# Patient Record
Sex: MALE | Race: BLACK OR AFRICAN AMERICAN | NOT HISPANIC OR LATINO | Employment: OTHER | URBAN - METROPOLITAN AREA
[De-identification: names, ages, dates, MRNs, and addresses within clinical notes are randomized per-mention and may not be internally consistent; named-entity substitution may affect disease eponyms.]

---

## 2018-09-07 ENCOUNTER — APPOINTMENT (OUTPATIENT)
Dept: RADIOLOGY | Facility: HOSPITAL | Age: 53
End: 2018-09-07
Attending: ORTHOPAEDIC SURGERY
Payer: MEDICARE

## 2018-09-07 ENCOUNTER — TELEPHONE (OUTPATIENT)
Dept: ORTHOPEDICS | Facility: CLINIC | Age: 53
End: 2018-09-07

## 2018-09-07 ENCOUNTER — OFFICE VISIT (OUTPATIENT)
Dept: ORTHOPEDICS | Facility: CLINIC | Age: 53
End: 2018-09-07
Payer: MEDICARE

## 2018-09-07 VITALS
DIASTOLIC BLOOD PRESSURE: 80 MMHG | BODY MASS INDEX: 28.32 KG/M2 | SYSTOLIC BLOOD PRESSURE: 120 MMHG | HEIGHT: 65 IN | WEIGHT: 170 LBS | HEART RATE: 87 BPM

## 2018-09-07 DIAGNOSIS — S82.872B OPEN PILON FRACTURE, LEFT, TYPE I OR II, INITIAL ENCOUNTER: ICD-10-CM

## 2018-09-07 DIAGNOSIS — S82.872B OPEN PILON FRACTURE, LEFT, TYPE I OR II, INITIAL ENCOUNTER: Primary | ICD-10-CM

## 2018-09-07 PROCEDURE — 99203 OFFICE O/P NEW LOW 30 MIN: CPT | Mod: S$PBB,,, | Performed by: ORTHOPAEDIC SURGERY

## 2018-09-07 PROCEDURE — 99999 PR PBB SHADOW E&M-NEW PATIENT-LVL IV: CPT | Mod: PBBFAC,,, | Performed by: ORTHOPAEDIC SURGERY

## 2018-09-07 PROCEDURE — 73610 X-RAY EXAM OF ANKLE: CPT | Mod: 26,LT,, | Performed by: RADIOLOGY

## 2018-09-07 PROCEDURE — 73590 X-RAY EXAM OF LOWER LEG: CPT | Mod: TC,FY,PN,LT

## 2018-09-07 PROCEDURE — 73610 X-RAY EXAM OF ANKLE: CPT | Mod: TC,FY,PN,LT

## 2018-09-07 PROCEDURE — 99204 OFFICE O/P NEW MOD 45 MIN: CPT | Mod: PBBFAC,25,PN | Performed by: ORTHOPAEDIC SURGERY

## 2018-09-07 PROCEDURE — 3008F BODY MASS INDEX DOCD: CPT | Mod: CPTII,,, | Performed by: ORTHOPAEDIC SURGERY

## 2018-09-07 PROCEDURE — 73590 X-RAY EXAM OF LOWER LEG: CPT | Mod: 26,LT,, | Performed by: RADIOLOGY

## 2018-09-07 RX ORDER — APIXABAN 5 MG/1
5 TABLET, FILM COATED ORAL 2 TIMES DAILY
Status: ON HOLD | COMMUNITY
Start: 2018-08-14 | End: 2019-01-10 | Stop reason: HOSPADM

## 2018-09-07 RX ORDER — OXYCODONE AND ACETAMINOPHEN 5; 325 MG/1; MG/1
TABLET ORAL
COMMUNITY
Start: 2018-08-21 | End: 2018-09-07

## 2018-09-07 RX ORDER — AMLODIPINE BESYLATE 10 MG/1
10 TABLET ORAL DAILY
COMMUNITY
Start: 2018-08-14

## 2018-09-07 RX ORDER — OXYCODONE AND ACETAMINOPHEN 5; 325 MG/1; MG/1
1 TABLET ORAL EVERY 4 HOURS PRN
Qty: 15 TABLET | Refills: 0 | Status: SHIPPED | OUTPATIENT
Start: 2018-09-07 | End: 2018-09-12

## 2018-09-07 NOTE — LETTER
September 7, 2018      Edenilson Trevino MD  [address]  [address]  [place] [address]           [facility]  [address]  [place] [address]  Phone: [phone]          Patient: Elias Phelps   MR Number: [id number]   YOB: 1965   Date of Visit: 9/7/2018       Dear Dr. Edenilson Trevino:    Thank you for referring Elias Phelps to me for evaluation. Attached you will find relevant portions of my assessment and plan of care.    If you have questions, please do not hesitate to call me. I look forward to following Elias Phelps along with you.    Sincerely,    Candida Marina MD    Enclosure  CC:  No Recipients    If you would like to receive this communication electronically, please contact [email or web address] or [phone] to request more information on [employer] Link access.    For providers and/or their staff who would like to refer a patient to [facility], please contact us through our one-stop-shop provider referral line, [facility] , at [phone].    If you feel you have received this communication in error or would no longer like to receive these types of communications, please e-mail [email or web address]

## 2018-09-07 NOTE — PROGRESS NOTES
CC: Left open pilon fracture      HPI: Elias Phelps is a 52 y.o. male who presents today for follow up of his left open pilon fracture.  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  Per outside records he was taken to [facility] as a trauma activation.  He initially had a pulseless left foot however pulses returned with reduction of the fracture and he was cleared by vascular surgery.  On 8/9 he underwent I&D of the wound with ORIF of the fibula, percutaneous fixation of the tibia, antibiotic spacer placement and external fixation.  He was followed up in [facility] fracture clinic and scheduled for definitive fixation of the tibia on 8/20 but has not undergone this procedure due to insurance issues with [facility].   He reports that he has been complaint with elevation of the extremity and NWB.  He denies fever, chills, numbness or paresthesias.  He states his pain is well controlled and he has been taking his percocet sparingly.  He denies drainage through his dressings and he has not changed the dressing since he was seen in clinic.    He brought in outside medical records and imaging from [facility] which have been scanned into the media section of his chart.       Occupation: Currently unemployed    Review of Systems   Constitutional: Negative.    HENT: Negative.    Eyes: Negative.    Respiratory: Negative.    Cardiovascular: Negative.    Gastrointestinal: Negative.    Genitourinary: Negative.    Musculoskeletal: Positive for falls and joint pain.   Skin: Negative.    Neurological: Negative.    Psychiatric/Behavioral: Negative.         Review of patient's allergies indicates:  No Known Allergies    Current Outpatient Medications:     amLODIPine (NORVASC) 10 MG tablet, Take 10 mg by mouth once daily. , Disp: , Rfl:     ELIQUIS 5 mg Tab, Take 5 mg by mouth 2 (two) times daily. , Disp: , Rfl:     hydrochlorothiazide (HYDRODIURIL) 25 MG tablet, Take 0.5 tablets (12.5 mg  total) by mouth once daily., Disp: 30 tablet, Rfl: 0    lisinopril 10 MG tablet, Take 1 tablet (10 mg total) by mouth once daily., Disp: 30 tablet, Rfl: 0    oxyCODONE-acetaminophen (PERCOCET) 5-325 mg per tablet, Take 1 tablet by mouth every 4 (four) hours as needed for Pain., Disp: 15 tablet, Rfl: 0  No past medical history on file.  Social History     Tobacco Use    Smoking status: Current Every Day Smoker     Packs/day: 0.50     Types: Cigarettes    Smokeless tobacco: Never Used   Substance Use Topics    Alcohol use: Yes     Comment: ocassionallly    Drug use: No     No family history on file.    Physical Exam:     Vitals:    18 1102   BP: 120/80   Pulse: 87         Gen: NAD, A&O  Resp: No audible wheezing or respiratory distress   CV: 2+ pulses, all extremities warm and well perfused   HEENT:  Normocephalic, atraumatic    Integumentary: No rashes, lacerations or abrasions with exception of LLE as described below   Left Lower Extremity    Ex fix in place, pin sites clean and dry   Incisions and traumatic wound appear to be healing well with suture in place.   Small amount of dry serosanguinous drainage to dressing, no evidence of continued or recent drainage  No erythema, induration, fluctuance surrounding wounds  Soft tissue swelling over the tibia  appears amenable to ORIF  -- + skin wrinkling   ltsi s/s/sp/dp/t  Able to fire ta/gs but motion limited due to ex fix/apprehension. Intact ehl/fhl  2+ DP, all toes WWP with CR < 2 seconds        Imagin views L tibia and 3 views L ankle show a pilon fracture with previous ex fix placement and ORIF of the fibula with a plate and two percutenous screws into the distal lateral tibia. The fracture line extends into the joint but there does not appear to be significant comminution of the articular surface.  There is an area of bone loss over the medial tibia that has been filled with antibiotic cement.  The fracture is reasonably well reduced and there  are no radiographic signs of healing or bony bridging.      Assessment: L open pilon fracture on 8/8/18 s/p ex fix, ORIF fibula, percutaneous fixation of the tibia and antibiotic spacer placement on 8/9/18  Plan:   - Could not take sutures out today due to lack of supplies in clinic   - Continue NWB LLE  - Refilled 15 percocet 5/325 as he is almost out of his previous prescription and having significant pain at night   - Continue elevation of LLE above level of heart   - Referral placed for him to follow up with Dr. Shearer.  I discussed patient with Dr Trevino who previously treated him and he now has very limited OR time at [facility] and cannot continue care due to insurance issues with [facility].  A message was sent to Dr. Shearer's nurse to facilitate an urgent appointment.

## 2018-09-07 NOTE — TELEPHONE ENCOUNTER
Have send a message for to Dr. Shearer to reached out to Mr. Phelps to set him up with a appt soon as possible. Thanks

## 2018-09-10 ENCOUNTER — TELEPHONE (OUTPATIENT)
Dept: ORTHOPEDICS | Facility: CLINIC | Age: 53
End: 2018-09-10

## 2018-09-10 NOTE — TELEPHONE ENCOUNTER
----- Message from Destiney Scott MA sent at 9/10/2018 11:12 AM CDT -----  Contact: wife-Donya      ----- Message -----  From: Chito Claire  Sent: 9/10/2018  10:43 AM  To: Janki DHILLON Staff    Pt wife is requesting a call back regarding a sooner appt. Pt wife states pt was seen at the [facility] and was referred to Dr. Shearer for foot/ankle fracture. Pt can be reached at [phone].

## 2018-09-12 ENCOUNTER — OFFICE VISIT (OUTPATIENT)
Dept: ORTHOPEDICS | Facility: CLINIC | Age: 53
End: 2018-09-12
Payer: MEDICARE

## 2018-09-12 ENCOUNTER — TELEPHONE (OUTPATIENT)
Dept: ORTHOPEDICS | Facility: CLINIC | Age: 53
End: 2018-09-12

## 2018-09-12 ENCOUNTER — LAB VISIT (OUTPATIENT)
Dept: LAB | Facility: HOSPITAL | Age: 53
End: 2018-09-12
Payer: MEDICARE

## 2018-09-12 VITALS
BODY MASS INDEX: 28.32 KG/M2 | SYSTOLIC BLOOD PRESSURE: 126 MMHG | HEIGHT: 65 IN | DIASTOLIC BLOOD PRESSURE: 85 MMHG | TEMPERATURE: 98 F | HEART RATE: 81 BPM | WEIGHT: 170 LBS

## 2018-09-12 DIAGNOSIS — S82.872B OPEN PILON FRACTURE, LEFT, TYPE I OR II, INITIAL ENCOUNTER: Primary | ICD-10-CM

## 2018-09-12 DIAGNOSIS — S82.872B OPEN PILON FRACTURE, LEFT, TYPE I OR II, INITIAL ENCOUNTER: ICD-10-CM

## 2018-09-12 LAB
ALBUMIN SERPL BCP-MCNC: 3.6 G/DL
ALP SERPL-CCNC: 91 U/L
ALT SERPL W/O P-5'-P-CCNC: 29 U/L
ANION GAP SERPL CALC-SCNC: 7 MMOL/L
AST SERPL-CCNC: 24 U/L
BASOPHILS # BLD AUTO: 0.02 K/UL
BASOPHILS NFR BLD: 0.3 %
BILIRUB SERPL-MCNC: 0.4 MG/DL
BUN SERPL-MCNC: 18 MG/DL
CALCIUM SERPL-MCNC: 9.5 MG/DL
CHLORIDE SERPL-SCNC: 107 MMOL/L
CO2 SERPL-SCNC: 26 MMOL/L
CREAT SERPL-MCNC: 1 MG/DL
CRP SERPL-MCNC: 6.7 MG/L
DIFFERENTIAL METHOD: ABNORMAL
EOSINOPHIL # BLD AUTO: 0.2 K/UL
EOSINOPHIL NFR BLD: 2.5 %
ERYTHROCYTE [DISTWIDTH] IN BLOOD BY AUTOMATED COUNT: 13.6 %
ERYTHROCYTE [SEDIMENTATION RATE] IN BLOOD BY WESTERGREN METHOD: 25 MM/HR
EST. GFR  (AFRICAN AMERICAN): >60 ML/MIN/1.73 M^2
EST. GFR  (NON AFRICAN AMERICAN): >60 ML/MIN/1.73 M^2
GLUCOSE SERPL-MCNC: 88 MG/DL
HCT VFR BLD AUTO: 39.3 %
HGB BLD-MCNC: 12.1 G/DL
IMM GRANULOCYTES # BLD AUTO: 0.06 K/UL
IMM GRANULOCYTES NFR BLD AUTO: 0.9 %
LYMPHOCYTES # BLD AUTO: 1.8 K/UL
LYMPHOCYTES NFR BLD: 26.2 %
MCH RBC QN AUTO: 29 PG
MCHC RBC AUTO-ENTMCNC: 30.8 G/DL
MCV RBC AUTO: 94 FL
MONOCYTES # BLD AUTO: 0.5 K/UL
MONOCYTES NFR BLD: 7.9 %
NEUTROPHILS # BLD AUTO: 4.3 K/UL
NEUTROPHILS NFR BLD: 62.2 %
NRBC BLD-RTO: 0 /100 WBC
PLATELET # BLD AUTO: 306 K/UL
PMV BLD AUTO: 9.1 FL
POTASSIUM SERPL-SCNC: 4.4 MMOL/L
PREALB SERPL-MCNC: 31 MG/DL
PROT SERPL-MCNC: 7.9 G/DL
RBC # BLD AUTO: 4.17 M/UL
SODIUM SERPL-SCNC: 140 MMOL/L
WBC # BLD AUTO: 6.87 K/UL

## 2018-09-12 PROCEDURE — 36415 COLL VENOUS BLD VENIPUNCTURE: CPT

## 2018-09-12 PROCEDURE — 85652 RBC SED RATE AUTOMATED: CPT

## 2018-09-12 PROCEDURE — 99214 OFFICE O/P EST MOD 30 MIN: CPT | Mod: S$PBB,,, | Performed by: ORTHOPAEDIC SURGERY

## 2018-09-12 PROCEDURE — 3008F BODY MASS INDEX DOCD: CPT | Mod: CPTII,,, | Performed by: ORTHOPAEDIC SURGERY

## 2018-09-12 PROCEDURE — 99999 PR PBB SHADOW E&M-EST. PATIENT-LVL III: CPT | Mod: PBBFAC,,, | Performed by: ORTHOPAEDIC SURGERY

## 2018-09-12 PROCEDURE — 80053 COMPREHEN METABOLIC PANEL: CPT

## 2018-09-12 PROCEDURE — 84134 ASSAY OF PREALBUMIN: CPT

## 2018-09-12 PROCEDURE — 86140 C-REACTIVE PROTEIN: CPT

## 2018-09-12 PROCEDURE — 85025 COMPLETE CBC W/AUTO DIFF WBC: CPT

## 2018-09-12 PROCEDURE — 99213 OFFICE O/P EST LOW 20 MIN: CPT | Mod: PBBFAC | Performed by: ORTHOPAEDIC SURGERY

## 2018-09-12 RX ORDER — SULFAMETHOXAZOLE AND TRIMETHOPRIM 800; 160 MG/1; MG/1
1 TABLET ORAL 2 TIMES DAILY
Qty: 14 TABLET | Refills: 0 | Status: SHIPPED | OUTPATIENT
Start: 2018-09-12 | End: 2018-09-19

## 2018-09-12 RX ORDER — OXYCODONE AND ACETAMINOPHEN 10; 325 MG/1; MG/1
1 TABLET ORAL
Qty: 42 TABLET | Refills: 0 | Status: SHIPPED | OUTPATIENT
Start: 2018-09-12 | End: 2018-10-24

## 2018-09-12 RX ORDER — OXYCODONE AND ACETAMINOPHEN 10; 325 MG/1; MG/1
1 TABLET ORAL
Qty: 42 TABLET | Refills: 0 | Status: SHIPPED | OUTPATIENT
Start: 2018-09-12 | End: 2018-09-12

## 2018-09-12 RX ORDER — LOSARTAN POTASSIUM 50 MG/1
50 TABLET ORAL DAILY
COMMUNITY
Start: 2018-09-08

## 2018-09-12 NOTE — LETTER
September 16, 2018      Candida Marina MD  [address]  [place] [address]           [facility]  [address]  [facility] [address]  Phone: [phone]          Patient: Elias Phelps   MR Number: [id number]   YOB: 1965   Date of Visit: 9/12/2018       Dear Dr. Candida Marina:    Thank you for referring Elias Phelps to me for evaluation. Attached you will find relevant portions of my assessment and plan of care.    If you have questions, please do not hesitate to call me. I look forward to following Elias Phelps along with you.    Sincerely,    Bharathi Shearer MD    Enclosure  CC:  No Recipients    If you would like to receive this communication electronically, please contact [email or web address] or [phone] to request more information on [employer] Link access.    For providers and/or their staff who would like to refer a patient to [facility], please contact us through our one-stop-shop provider referral line, [facility] , at [phone].    If you feel you have received this communication in error or would no longer like to receive these types of communications, please e-mail [email or web address]

## 2018-09-12 NOTE — TELEPHONE ENCOUNTER
----- Message from Kathi Xie sent at 9/12/2018 11:52 AM CDT -----  Contact: Pharmacy/  [phone]  Pharmacy Calling    Reason for call: Pharmacy needs more information and coding.   Pharmacy Name: [facility].   Prescription Name: oxyCODONE-acetaminophen (PERCOCET)  mg per.   Phone Number: [phone]

## 2018-09-12 NOTE — PROGRESS NOTES
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  Originally brought to [facility].  Regained pulses and was taken to the OR for spanning external fixation with abx spacer placement and ORIF of the fibula on 8/9/18.  His medial open wound was closed at that point in time.  He was originally scheduled for f/u surgery on 8/20, but had insurance issues and was unable to do so.  He is in my clinic today to discuss options moving forward for definitive treatment.  His pain is 5/10 at its worst, controlled by PO pain medication.      Patient Active Problem List    Diagnosis Date Noted    Open pilon fracture, left, type I or II, initial encounter 09/07/2018     Past Medical History:   Diagnosis Date    Hypertension       Past Surgical History:   Procedure Laterality Date    FIBULA FRACTURE SURGERY Left     GALLBLADDER SURGERY N/A 1991        (Not in a hospital admission)  Review of patient's allergies indicates:  No Known Allergies   Social History     Tobacco Use    Smoking status: Current Every Day Smoker     Packs/day: 0.50     Types: Cigarettes    Smokeless tobacco: Never Used   Substance Use Topics    Alcohol use: Yes     Comment: ocassionallly      Family History   Problem Relation Age of Onset    Hypertension Mother     Hypertension Father       Review of Systems    ROS:  Patient denies constitutional symptoms, cardiac symptoms, respiratory symptoms, GI symptoms.  The remainder of the musculoskeletal ROS is included in the HPI.      Objective:     PE:    AA&O x 4.  NAD  HEENT:  NCAT, sclera nonicteric  Lungs:  Respirations are equal and unlabored.  CV:  2+ bilateral upper and lower extremity pulses.    MS:  LLE - in spanning external fixation.  Lateral incision well healing.  Sutures in place.  Medial open wound vertical with transverse anterior component.  Sutures in place here as well.  No drainage or erythema.  Swollen but compartments soft.  Pin sites with some  dry scab.  Medial wound with some overlying scab.  Moves all toes.  2+ DP and PT pulses.        Imaging Review  External fixation in place.  Lateral Marti fibular plate.  Tibia with small medial cement abx spacer.  Mild medial translation and varus distal tibia. 2 screws A to P in the [place] area laterally just adjacent to the incisura.     Assessment:     Grade II-III open left tibial pilon fracture status post D&I, spanning external fixation and ORIF of the fibula, now 31 days out.      Plan:     We had a long discussion about his ankle today.  Ideally, this is a fracture pattern I would prefer to treat with a medial plate.  The open injury will likely preclude this as an option.  He is just over 4 weeks out and his fracture is very distal.  He has only a centimeter or 2 of bone between the physeal scar and the fracture site.  This would make nailing very difficult, and at this point, I would not want to do that through the old pin sites without first changing out the pins and debriding the old pin tracts.  With the open fracture, he is already at increased risk for infection.  I'd like to get the antibiotic spacer out, but going through the medial wound to do that may be difficult.      I would like to take him to the OR, clean up the medial wound and see how the soft tissue is doing, what is scab vs dried blood,  I will try to adjust the ex fix and see if I can straighten out the mild varus and medial translation he's fallen into over the last month, and get a better read on what the overall options will be.  I explained that I might just have to revise the ex fix with the plan of keeping him in that for definitive treatment.  I would also like to get him on Forteo, as it appears his fracture is comminuted and he likely had some bone debrided at initial surgery, and he has some gap to fill.    The risks, benefits and alternatives to surgery were discussed with the patient at great length.  These include  bleeding, infection, vessel/nerve damage, pain, numbness, tingling, complex regional pain syndrome, hardware/surgical failure, need for further surgery, malunion, nonunion, DVT, PE, arthritis and death.  He is alos at increased risk for infection with open fracture.  Patient states an understanding and wishes to proceed with surgery.   All questions were answered.  No guarantees were implied or stated.  Informed consent was obtained.

## 2018-09-14 ENCOUNTER — TELEPHONE (OUTPATIENT)
Dept: ORTHOPEDICS | Facility: CLINIC | Age: 53
End: 2018-09-14

## 2018-09-14 NOTE — TELEPHONE ENCOUNTER
Spoke with pt.   Advised to take his Eliquis Sunday am but to hold the medication after that dose until after his sx on Tuesday, per Dr Shearer.  Pt verbalized understanding.

## 2018-09-14 NOTE — TELEPHONE ENCOUNTER
----- Message from Yennifer Enciso PA-C sent at 9/14/2018  8:41 AM CDT -----  [facility]  Prescription should have [facility]dy [facility], I though you has called and cancelled [facility],   Please check.   ----- Message -----  From: Destiney Scott MA  Sent: 9/14/2018   8:32 AM  To: Yennifer Enciso PA-C        ----- Message -----  From: Dago Morrow  Sent: 9/14/2018   8:22 AM  To: Fernie De Oliveira Staff    Need a diagnosis for the oxyCODONE-acetaminophen (PERCOCET)  mg per tablet

## 2018-09-14 NOTE — TELEPHONE ENCOUNTER
Notified [facility] [phone] and spoke with Cristin, who stated that Yue just deactivate the Rx Percocet on 09/12/18 and not cx. Today, 09/14/18 Cristin have cx the Rx all together, Percocet.

## 2018-09-17 ENCOUNTER — TELEPHONE (OUTPATIENT)
Dept: ORTHOPEDICS | Facility: CLINIC | Age: 53
End: 2018-09-17

## 2018-09-17 ENCOUNTER — ANESTHESIA EVENT (OUTPATIENT)
Dept: SURGERY | Facility: HOSPITAL | Age: 53
End: 2018-09-17
Payer: MEDICARE

## 2018-09-17 NOTE — H&P
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  Originally brought to [facility].  Regained pulses and was taken to the OR for spanning external fixation with abx spacer placement and ORIF of the fibula on 8/9/18.  His medial open wound was closed at that point in time.  He was originally scheduled for f/u surgery on 8/20, but had insurance issues and was unable to do so.  He is in my clinic today to discuss options moving forward for definitive treatment.  His pain is 5/10 at its worst, controlled by PO pain medication.      Patient Active Problem List    Diagnosis Date Noted    Open pilon fracture, left, type I or II, initial encounter 09/07/2018     Past Medical History:   Diagnosis Date    Hypertension       Past Surgical History:   Procedure Laterality Date    FIBULA FRACTURE SURGERY Left     GALLBLADDER SURGERY N/A 1991        (Not in a hospital admission)  Review of patient's allergies indicates:  No Known Allergies   Social History     Tobacco Use    Smoking status: Current Every Day Smoker     Packs/day: 0.50     Types: Cigarettes    Smokeless tobacco: Never Used   Substance Use Topics    Alcohol use: Yes     Comment: ocassionallly      Family History   Problem Relation Age of Onset    Hypertension Mother     Hypertension Father       Review of Systems    ROS:  Patient denies constitutional symptoms, cardiac symptoms, respiratory symptoms, GI symptoms.  The remainder of the musculoskeletal ROS is included in the HPI.      Objective:     PE:    AA&O x 4.  NAD  HEENT:  NCAT, sclera nonicteric  Lungs:  Respirations are equal and unlabored.  CV:  2+ bilateral upper and lower extremity pulses.    MS:  LLE - in spanning external fixation.  Lateral incision well healing.  Sutures in place.  Medial open wound vertical with transverse anterior component.  Sutures in place here as well.  No drainage or erythema.  Swollen but compartments soft.  Pin sites with some  dry scab.  Medial wound with some overlying scab.  Moves all toes.  2+ DP and PT pulses.        Imaging Review  External fixation in place.  Lateral Marti fibular plate.  Tibia with small medial cement abx spacer.  Mild medial translation and varus distal tibia. 2 screws A to P in the [place] area laterally just adjacent to the incisura.     Assessment:     Grade II-III open left tibial pilon fracture status post D&I, spanning external fixation and ORIF of the fibula, now 31 days out.      Plan:     We had a long discussion about his ankle today.  Ideally, this is a fracture pattern I would prefer to treat with a medial plate.  The open injury will likely preclude this as an option.  He is just over 4 weeks out and his fracture is very distal.  He has only a centimeter or 2 of bone between the physeal scar and the fracture site.  This would make nailing very difficult, and at this point, I would not want to do that through the old pin sites without first changing out the pins and debriding the old pin tracts.  With the open fracture, he is already at increased risk for infection.  I'd like to get the antibiotic spacer out, but going through the medial wound to do that may be difficult.      I would like to take him to the OR, clean up the medial wound and see how the soft tissue is doing, what is scab vs dried blood,  I will try to adjust the ex fix and see if I can straighten out the mild varus and medial translation he's fallen into over the last month, and get a better read on what the overall options will be.  I explained that I might just have to revise the ex fix with the plan of keeping him in that for definitive treatment.  I would also like to get him on Forteo, as it appears his fracture is comminuted and he likely had some bone debrided at initial surgery, and he has some gap to fill.    The risks, benefits and alternatives to surgery were discussed with the patient at great length.  These include  bleeding, infection, vessel/nerve damage, pain, numbness, tingling, complex regional pain syndrome, hardware/surgical failure, need for further surgery, malunion, nonunion, DVT, PE, arthritis and death.  He is alos at increased risk for infection with open fracture.  Patient states an understanding and wishes to proceed with surgery.   All questions were answered.  No guarantees were implied or stated.  Informed consent was obtained.

## 2018-09-17 NOTE — H&P (VIEW-ONLY)
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  Originally brought to [facility].  Regained pulses and was taken to the OR for spanning external fixation with abx spacer placement and ORIF of the fibula on 8/9/18.  His medial open wound was closed at that point in time.  He was originally scheduled for f/u surgery on 8/20, but had insurance issues and was unable to do so.  He is in my clinic today to discuss options moving forward for definitive treatment.  His pain is 5/10 at its worst, controlled by PO pain medication.      Patient Active Problem List    Diagnosis Date Noted    Open pilon fracture, left, type I or II, initial encounter 09/07/2018     Past Medical History:   Diagnosis Date    Hypertension       Past Surgical History:   Procedure Laterality Date    FIBULA FRACTURE SURGERY Left     GALLBLADDER SURGERY N/A 1991        (Not in a hospital admission)  Review of patient's allergies indicates:  No Known Allergies   Social History     Tobacco Use    Smoking status: Current Every Day Smoker     Packs/day: 0.50     Types: Cigarettes    Smokeless tobacco: Never Used   Substance Use Topics    Alcohol use: Yes     Comment: ocassionallly      Family History   Problem Relation Age of Onset    Hypertension Mother     Hypertension Father       Review of Systems    ROS:  Patient denies constitutional symptoms, cardiac symptoms, respiratory symptoms, GI symptoms.  The remainder of the musculoskeletal ROS is included in the HPI.      Objective:     PE:    AA&O x 4.  NAD  HEENT:  NCAT, sclera nonicteric  Lungs:  Respirations are equal and unlabored.  CV:  2+ bilateral upper and lower extremity pulses.    MS:  LLE - in spanning external fixation.  Lateral incision well healing.  Sutures in place.  Medial open wound vertical with transverse anterior component.  Sutures in place here as well.  No drainage or erythema.  Swollen but compartments soft.  Pin sites with some  dry scab.  Medial wound with some overlying scab.  Moves all toes.  2+ DP and PT pulses.        Imaging Review  External fixation in place.  Lateral Marti fibular plate.  Tibia with small medial cement abx spacer.  Mild medial translation and varus distal tibia. 2 screws A to P in the [place] area laterally just adjacent to the incisura.     Assessment:     Grade II-III open left tibial pilon fracture status post D&I, spanning external fixation and ORIF of the fibula, now 31 days out.      Plan:     We had a long discussion about his ankle today.  Ideally, this is a fracture pattern I would prefer to treat with a medial plate.  The open injury will likely preclude this as an option.  He is just over 4 weeks out and his fracture is very distal.  He has only a centimeter or 2 of bone between the physeal scar and the fracture site.  This would make nailing very difficult, and at this point, I would not want to do that through the old pin sites without first changing out the pins and debriding the old pin tracts.  With the open fracture, he is already at increased risk for infection.  I'd like to get the antibiotic spacer out, but going through the medial wound to do that may be difficult.      I would like to take him to the OR, clean up the medial wound and see how the soft tissue is doing, what is scab vs dried blood,  I will try to adjust the ex fix and see if I can straighten out the mild varus and medial translation he's fallen into over the last month, and get a better read on what the overall options will be.  I explained that I might just have to revise the ex fix with the plan of keeping him in that for definitive treatment.  I would also like to get him on Forteo, as it appears his fracture is comminuted and he likely had some bone debrided at initial surgery, and he has some gap to fill.    The risks, benefits and alternatives to surgery were discussed with the patient at great length.  These include  bleeding, infection, vessel/nerve damage, pain, numbness, tingling, complex regional pain syndrome, hardware/surgical failure, need for further surgery, malunion, nonunion, DVT, PE, arthritis and death.  He is alos at increased risk for infection with open fracture.  Patient states an understanding and wishes to proceed with surgery.   All questions were answered.  No guarantees were implied or stated.  Informed consent was obtained.

## 2018-09-18 ENCOUNTER — ANESTHESIA (OUTPATIENT)
Dept: SURGERY | Facility: HOSPITAL | Age: 53
End: 2018-09-18
Payer: MEDICARE

## 2018-09-18 ENCOUNTER — HOSPITAL ENCOUNTER (OUTPATIENT)
Facility: HOSPITAL | Age: 53
Discharge: STILL ADMITTED OR ADMINISTRATIVE | End: 2018-09-21
Attending: ORTHOPAEDIC SURGERY | Admitting: ORTHOPAEDIC SURGERY
Payer: MEDICARE

## 2018-09-18 DIAGNOSIS — S82.872B OPEN PILON FRACTURE, LEFT, TYPE I OR II, INITIAL ENCOUNTER: Primary | ICD-10-CM

## 2018-09-18 DIAGNOSIS — S82.872B: ICD-10-CM

## 2018-09-18 PROCEDURE — 76942 ECHO GUIDE FOR BIOPSY: CPT | Mod: 26,,, | Performed by: ANESTHESIOLOGY

## 2018-09-18 PROCEDURE — 63600175 PHARM REV CODE 636 W HCPCS: Performed by: STUDENT IN AN ORGANIZED HEALTH CARE EDUCATION/TRAINING PROGRAM

## 2018-09-18 PROCEDURE — 36000706: Performed by: ORTHOPAEDIC SURGERY

## 2018-09-18 PROCEDURE — 25000003 PHARM REV CODE 250: Performed by: NURSE ANESTHETIST, CERTIFIED REGISTERED

## 2018-09-18 PROCEDURE — 25000003 PHARM REV CODE 250: Performed by: STUDENT IN AN ORGANIZED HEALTH CARE EDUCATION/TRAINING PROGRAM

## 2018-09-18 PROCEDURE — 27200750 HC INSULATED NEEDLE/ STIMUPLEX: Performed by: STUDENT IN AN ORGANIZED HEALTH CARE EDUCATION/TRAINING PROGRAM

## 2018-09-18 PROCEDURE — D9220A PRA ANESTHESIA: Mod: CRNA,,, | Performed by: NURSE ANESTHETIST, CERTIFIED REGISTERED

## 2018-09-18 PROCEDURE — 37000008 HC ANESTHESIA 1ST 15 MINUTES: Performed by: ORTHOPAEDIC SURGERY

## 2018-09-18 PROCEDURE — 11012 DEB SKIN BONE AT FX SITE: CPT | Mod: 51,,, | Performed by: ORTHOPAEDIC SURGERY

## 2018-09-18 PROCEDURE — 27201423 OPTIME MED/SURG SUP & DEVICES STERILE SUPPLY: Performed by: ORTHOPAEDIC SURGERY

## 2018-09-18 PROCEDURE — 71000033 HC RECOVERY, INTIAL HOUR: Performed by: ORTHOPAEDIC SURGERY

## 2018-09-18 PROCEDURE — 36000707: Performed by: ORTHOPAEDIC SURGERY

## 2018-09-18 PROCEDURE — C1713 ANCHOR/SCREW BN/BN,TIS/BN: HCPCS | Performed by: ORTHOPAEDIC SURGERY

## 2018-09-18 PROCEDURE — 64450 NJX AA&/STRD OTHER PN/BRANCH: CPT | Mod: 59,LT,, | Performed by: ANESTHESIOLOGY

## 2018-09-18 PROCEDURE — 20693 ADJMT/REVJ EXT FIXJ SYS ANES: CPT | Mod: 51,RT,, | Performed by: ORTHOPAEDIC SURGERY

## 2018-09-18 PROCEDURE — 63600175 PHARM REV CODE 636 W HCPCS: Performed by: NURSE ANESTHETIST, CERTIFIED REGISTERED

## 2018-09-18 PROCEDURE — 76942 ECHO GUIDE FOR BIOPSY: CPT | Performed by: STUDENT IN AN ORGANIZED HEALTH CARE EDUCATION/TRAINING PROGRAM

## 2018-09-18 PROCEDURE — D9220A PRA ANESTHESIA: Mod: ANES,,, | Performed by: ANESTHESIOLOGY

## 2018-09-18 PROCEDURE — 27000221 HC OXYGEN, UP TO 24 HOURS

## 2018-09-18 PROCEDURE — 37000009 HC ANESTHESIA EA ADD 15 MINS: Performed by: ORTHOPAEDIC SURGERY

## 2018-09-18 PROCEDURE — 27825 TREAT LOWER LEG FRACTURE: CPT | Mod: RT,,, | Performed by: ORTHOPAEDIC SURGERY

## 2018-09-18 PROCEDURE — 64447 NJX AA&/STRD FEMORAL NRV IMG: CPT | Performed by: ANESTHESIOLOGY

## 2018-09-18 PROCEDURE — 63600175 PHARM REV CODE 636 W HCPCS: Performed by: ANESTHESIOLOGY

## 2018-09-18 PROCEDURE — 64445 NJX AA&/STRD SCIATIC NRV IMG: CPT | Performed by: STUDENT IN AN ORGANIZED HEALTH CARE EDUCATION/TRAINING PROGRAM

## 2018-09-18 PROCEDURE — 94761 N-INVAS EAR/PLS OXIMETRY MLT: CPT

## 2018-09-18 PROCEDURE — 20680 REMOVAL OF IMPLANT DEEP: CPT | Mod: 51,,, | Performed by: ORTHOPAEDIC SURGERY

## 2018-09-18 PROCEDURE — G0378 HOSPITAL OBSERVATION PER HR: HCPCS

## 2018-09-18 PROCEDURE — 71000039 HC RECOVERY, EACH ADD'L HOUR: Performed by: ORTHOPAEDIC SURGERY

## 2018-09-18 PROCEDURE — 63600175 PHARM REV CODE 636 W HCPCS: Performed by: ORTHOPAEDIC SURGERY

## 2018-09-18 PROCEDURE — 27800903 OPTIME MED/SURG SUP & DEVICES OTHER IMPLANTS: Performed by: ORTHOPAEDIC SURGERY

## 2018-09-18 PROCEDURE — 25000003 PHARM REV CODE 250: Performed by: ORTHOPAEDIC SURGERY

## 2018-09-18 PROCEDURE — 12000002 HC ACUTE/MED SURGE SEMI-PRIVATE ROOM

## 2018-09-18 DEVICE — IMPLANTABLE DEVICE: Type: IMPLANTABLE DEVICE | Site: ANKLE | Status: FUNCTIONAL

## 2018-09-18 DEVICE — PIN TRNSFIXTN APEX 5/6X40X300M: Type: IMPLANTABLE DEVICE | Site: ANKLE | Status: FUNCTIONAL

## 2018-09-18 DEVICE — KIT MED BONE INFUSE: Type: IMPLANTABLE DEVICE | Site: ANKLE | Status: FUNCTIONAL

## 2018-09-18 DEVICE — PIN HALF APEX 4/5MM 40X150MM: Type: IMPLANTABLE DEVICE | Site: ANKLE | Status: FUNCTIONAL

## 2018-09-18 DEVICE — PIN APEX 5 X 150: Type: IMPLANTABLE DEVICE | Site: ANKLE | Status: FUNCTIONAL

## 2018-09-18 RX ORDER — ONDANSETRON 2 MG/ML
INJECTION INTRAMUSCULAR; INTRAVENOUS
Status: DISCONTINUED | OUTPATIENT
Start: 2018-09-18 | End: 2018-09-18

## 2018-09-18 RX ORDER — ONDANSETRON 8 MG/1
8 TABLET, ORALLY DISINTEGRATING ORAL EVERY 6 HOURS PRN
Qty: 30 TABLET | Refills: 0 | Status: SHIPPED | OUTPATIENT
Start: 2018-09-18 | End: 2018-12-05

## 2018-09-18 RX ORDER — OXYCODONE HYDROCHLORIDE 5 MG/1
5 TABLET ORAL
Status: DISCONTINUED | OUTPATIENT
Start: 2018-09-18 | End: 2018-09-18 | Stop reason: HOSPADM

## 2018-09-18 RX ORDER — LIDOCAINE HCL/PF 100 MG/5ML
SYRINGE (ML) INTRAVENOUS
Status: DISCONTINUED | OUTPATIENT
Start: 2018-09-18 | End: 2018-09-18

## 2018-09-18 RX ORDER — GLYCOPYRROLATE 0.2 MG/ML
INJECTION INTRAMUSCULAR; INTRAVENOUS
Status: DISCONTINUED | OUTPATIENT
Start: 2018-09-18 | End: 2018-09-18

## 2018-09-18 RX ORDER — MIDAZOLAM HYDROCHLORIDE 1 MG/ML
0.5 INJECTION INTRAMUSCULAR; INTRAVENOUS
Status: DISCONTINUED | OUTPATIENT
Start: 2018-09-18 | End: 2018-09-18 | Stop reason: HOSPADM

## 2018-09-18 RX ORDER — LOSARTAN POTASSIUM 25 MG/1
25 TABLET ORAL DAILY
Status: DISCONTINUED | OUTPATIENT
Start: 2018-09-19 | End: 2018-09-21 | Stop reason: HOSPADM

## 2018-09-18 RX ORDER — CEFAZOLIN SODIUM 1 G/3ML
2 INJECTION, POWDER, FOR SOLUTION INTRAMUSCULAR; INTRAVENOUS
Status: COMPLETED | OUTPATIENT
Start: 2018-09-18 | End: 2018-09-18

## 2018-09-18 RX ORDER — FENTANYL CITRATE 50 UG/ML
25 INJECTION, SOLUTION INTRAMUSCULAR; INTRAVENOUS EVERY 5 MIN PRN
Status: DISCONTINUED | OUTPATIENT
Start: 2018-09-18 | End: 2018-09-18 | Stop reason: HOSPADM

## 2018-09-18 RX ORDER — PHENYLEPHRINE HYDROCHLORIDE 10 MG/ML
INJECTION INTRAVENOUS
Status: DISCONTINUED | OUTPATIENT
Start: 2018-09-18 | End: 2018-09-18

## 2018-09-18 RX ORDER — OXYCODONE HYDROCHLORIDE 10 MG/1
10 TABLET ORAL EVERY 4 HOURS PRN
Status: DISCONTINUED | OUTPATIENT
Start: 2018-09-18 | End: 2018-09-19

## 2018-09-18 RX ORDER — NEOSTIGMINE METHYLSULFATE 1 MG/ML
INJECTION, SOLUTION INTRAVENOUS
Status: DISCONTINUED | OUTPATIENT
Start: 2018-09-18 | End: 2018-09-18

## 2018-09-18 RX ORDER — SODIUM CHLORIDE 9 MG/ML
INJECTION, SOLUTION INTRAVENOUS CONTINUOUS PRN
Status: DISCONTINUED | OUTPATIENT
Start: 2018-09-18 | End: 2018-09-18

## 2018-09-18 RX ORDER — OXYCODONE HYDROCHLORIDE 5 MG/1
5 TABLET ORAL EVERY 4 HOURS PRN
Status: DISCONTINUED | OUTPATIENT
Start: 2018-09-18 | End: 2018-09-19

## 2018-09-18 RX ORDER — BUPIVACAINE HYDROCHLORIDE AND EPINEPHRINE 5; 5 MG/ML; UG/ML
INJECTION, SOLUTION EPIDURAL; INTRACAUDAL; PERINEURAL
Status: COMPLETED | OUTPATIENT
Start: 2018-09-18 | End: 2018-09-18

## 2018-09-18 RX ORDER — DOCUSATE SODIUM 100 MG/1
100 CAPSULE, LIQUID FILLED ORAL 2 TIMES DAILY PRN
Qty: 60 CAPSULE | Refills: 0 | Status: SHIPPED | OUTPATIENT
Start: 2018-09-18 | End: 2018-12-05

## 2018-09-18 RX ORDER — ROCURONIUM BROMIDE 10 MG/ML
INJECTION, SOLUTION INTRAVENOUS
Status: DISCONTINUED | OUTPATIENT
Start: 2018-09-18 | End: 2018-09-18

## 2018-09-18 RX ORDER — PROPOFOL 10 MG/ML
VIAL (ML) INTRAVENOUS
Status: DISCONTINUED | OUTPATIENT
Start: 2018-09-18 | End: 2018-09-18

## 2018-09-18 RX ORDER — ACETAMINOPHEN 500 MG
1000 TABLET ORAL EVERY 6 HOURS PRN
Status: DISCONTINUED | OUTPATIENT
Start: 2018-09-18 | End: 2018-09-20

## 2018-09-18 RX ORDER — CEFAZOLIN SODIUM 1 G/3ML
2 INJECTION, POWDER, FOR SOLUTION INTRAMUSCULAR; INTRAVENOUS
Status: DISCONTINUED | OUTPATIENT
Start: 2018-09-18 | End: 2018-09-21 | Stop reason: HOSPADM

## 2018-09-18 RX ORDER — EPHEDRINE SULFATE 50 MG/ML
INJECTION, SOLUTION INTRAVENOUS
Status: DISCONTINUED | OUTPATIENT
Start: 2018-09-18 | End: 2018-09-18

## 2018-09-18 RX ORDER — VANCOMYCIN HCL IN 5 % DEXTROSE 1G/250ML
1000 PLASTIC BAG, INJECTION (ML) INTRAVENOUS
Status: DISCONTINUED | OUTPATIENT
Start: 2018-09-18 | End: 2018-09-21 | Stop reason: HOSPADM

## 2018-09-18 RX ORDER — OXYCODONE AND ACETAMINOPHEN 5; 325 MG/1; MG/1
1 TABLET ORAL EVERY 4 HOURS PRN
Qty: 41 TABLET | Refills: 0 | Status: SHIPPED | OUTPATIENT
Start: 2018-09-18 | End: 2018-10-24 | Stop reason: ALTCHOICE

## 2018-09-18 RX ORDER — AMLODIPINE BESYLATE 10 MG/1
10 TABLET ORAL DAILY
Status: DISCONTINUED | OUTPATIENT
Start: 2018-09-19 | End: 2018-09-21 | Stop reason: HOSPADM

## 2018-09-18 RX ORDER — ONDANSETRON 4 MG/1
4 TABLET, FILM COATED ORAL EVERY 6 HOURS PRN
Status: DISCONTINUED | OUTPATIENT
Start: 2018-09-18 | End: 2018-09-21 | Stop reason: HOSPADM

## 2018-09-18 RX ORDER — HYDROMORPHONE HYDROCHLORIDE 1 MG/ML
0.5 INJECTION, SOLUTION INTRAMUSCULAR; INTRAVENOUS; SUBCUTANEOUS EVERY 6 HOURS PRN
Status: DISCONTINUED | OUTPATIENT
Start: 2018-09-18 | End: 2018-09-19

## 2018-09-18 RX ORDER — FENTANYL CITRATE 50 UG/ML
INJECTION, SOLUTION INTRAMUSCULAR; INTRAVENOUS
Status: DISCONTINUED | OUTPATIENT
Start: 2018-09-18 | End: 2018-09-18

## 2018-09-18 RX ORDER — VANCOMYCIN HYDROCHLORIDE 1 G/20ML
INJECTION, POWDER, LYOPHILIZED, FOR SOLUTION INTRAVENOUS
Status: DISCONTINUED | OUTPATIENT
Start: 2018-09-18 | End: 2018-09-18 | Stop reason: HOSPADM

## 2018-09-18 RX ADMIN — PHENYLEPHRINE HYDROCHLORIDE 200 MCG: 10 INJECTION INTRAVENOUS at 07:09

## 2018-09-18 RX ADMIN — FENTANYL CITRATE 100 MCG: 50 INJECTION, SOLUTION INTRAMUSCULAR; INTRAVENOUS at 07:09

## 2018-09-18 RX ADMIN — PHENYLEPHRINE HYDROCHLORIDE 250 MCG: 10 INJECTION INTRAVENOUS at 08:09

## 2018-09-18 RX ADMIN — ROCURONIUM BROMIDE 10 MG: 10 INJECTION, SOLUTION INTRAVENOUS at 08:09

## 2018-09-18 RX ADMIN — SODIUM CHLORIDE, SODIUM GLUCONATE, SODIUM ACETATE, POTASSIUM CHLORIDE, MAGNESIUM CHLORIDE, SODIUM PHOSPHATE, DIBASIC, AND POTASSIUM PHOSPHATE: .53; .5; .37; .037; .03; .012; .00082 INJECTION, SOLUTION INTRAVENOUS at 07:09

## 2018-09-18 RX ADMIN — APIXABAN 5 MG: 2.5 TABLET, FILM COATED ORAL at 09:09

## 2018-09-18 RX ADMIN — CEFAZOLIN 2 G: 330 INJECTION, POWDER, FOR SOLUTION INTRAMUSCULAR; INTRAVENOUS at 03:09

## 2018-09-18 RX ADMIN — VANCOMYCIN HYDROCHLORIDE 1000 MG: 1 INJECTION, POWDER, LYOPHILIZED, FOR SOLUTION INTRAVENOUS at 11:09

## 2018-09-18 RX ADMIN — EPHEDRINE SULFATE 5 MG: 50 INJECTION, SOLUTION INTRAMUSCULAR; INTRAVENOUS; SUBCUTANEOUS at 09:09

## 2018-09-18 RX ADMIN — Medication 0.5 MG: at 07:09

## 2018-09-18 RX ADMIN — CEFAZOLIN 2 G: 330 INJECTION, POWDER, FOR SOLUTION INTRAMUSCULAR; INTRAVENOUS at 07:09

## 2018-09-18 RX ADMIN — ROCURONIUM BROMIDE 40 MG: 10 INJECTION, SOLUTION INTRAVENOUS at 07:09

## 2018-09-18 RX ADMIN — GLYCOPYRROLATE 0.6 MG: 0.2 INJECTION, SOLUTION INTRAMUSCULAR; INTRAVENOUS at 09:09

## 2018-09-18 RX ADMIN — EPHEDRINE SULFATE 15 MG: 50 INJECTION, SOLUTION INTRAMUSCULAR; INTRAVENOUS; SUBCUTANEOUS at 08:09

## 2018-09-18 RX ADMIN — Medication 0.5 MG: at 01:09

## 2018-09-18 RX ADMIN — BUPIVACAINE HYDROCHLORIDE AND EPINEPHRINE BITARTRATE 30 ML: 5; .0091 INJECTION, SOLUTION EPIDURAL; INTRACAUDAL; PERINEURAL at 06:09

## 2018-09-18 RX ADMIN — PROPOFOL 150 MG: 10 INJECTION, EMULSION INTRAVENOUS at 07:09

## 2018-09-18 RX ADMIN — DOCUSATE SODIUM 50 MG: 50 CAPSULE, LIQUID FILLED ORAL at 01:09

## 2018-09-18 RX ADMIN — ROCURONIUM BROMIDE 20 MG: 10 INJECTION, SOLUTION INTRAVENOUS at 08:09

## 2018-09-18 RX ADMIN — CEFAZOLIN 2 G: 330 INJECTION, POWDER, FOR SOLUTION INTRAMUSCULAR; INTRAVENOUS at 11:09

## 2018-09-18 RX ADMIN — SODIUM CHLORIDE: 0.9 INJECTION, SOLUTION INTRAVENOUS at 06:09

## 2018-09-18 RX ADMIN — OXYCODONE HYDROCHLORIDE 10 MG: 10 TABLET ORAL at 10:09

## 2018-09-18 RX ADMIN — MIDAZOLAM HYDROCHLORIDE 2 MG: 1 INJECTION, SOLUTION INTRAMUSCULAR; INTRAVENOUS at 06:09

## 2018-09-18 RX ADMIN — ONDANSETRON 4 MG: 2 INJECTION INTRAMUSCULAR; INTRAVENOUS at 09:09

## 2018-09-18 RX ADMIN — NEOSTIGMINE METHYLSULFATE 5 MG: 1 INJECTION INTRAVENOUS at 09:09

## 2018-09-18 RX ADMIN — PHENYLEPHRINE HYDROCHLORIDE 300 MCG: 10 INJECTION INTRAVENOUS at 07:09

## 2018-09-18 RX ADMIN — ACETAMINOPHEN 1000 MG: 500 TABLET ORAL at 04:09

## 2018-09-18 RX ADMIN — OXYCODONE HYDROCHLORIDE 10 MG: 10 TABLET ORAL at 05:09

## 2018-09-18 RX ADMIN — EPHEDRINE SULFATE 5 MG: 50 INJECTION, SOLUTION INTRAMUSCULAR; INTRAVENOUS; SUBCUTANEOUS at 08:09

## 2018-09-18 RX ADMIN — EPHEDRINE SULFATE 25 MG: 50 INJECTION, SOLUTION INTRAMUSCULAR; INTRAVENOUS; SUBCUTANEOUS at 08:09

## 2018-09-18 RX ADMIN — LIDOCAINE HYDROCHLORIDE 100 MG: 20 INJECTION, SOLUTION INTRAVENOUS at 07:09

## 2018-09-18 NOTE — BRIEF OP NOTE
BRIEF OP NOTE    Preop Dx: Grade II open right tibial pilon with fibula fracture status post ORIF of fibula and partial tibia, with external fixation of tibia with I&D    Postop Dx: Grade II open right tibial pilon with fibula fracture status post ORIF of fibula and partial tibia, with external fixation of tibia with I&D    Procedure: 1.  Revision of external fixation right tibial pilon fracture - 20694    2.  Closed treatment of right tibial pilon fracture with manipulation under anesthesia - 27825    3.  Removal of antibiotic cement spacer, deep right tibia - 20680    4.  Non excisional debridement and irrigation of bone open right tibial pilon fracture - 11012    Surgeon: Bharathi Shearer M.D.    Asst:  Sherry Flores M.D    Anesthesia: GETA    EBL:  5cc    IVF:  1500cc crystalloid    Implants: [place] ex fix.  Medium Infuse sponge    Specimens: None    Findings: Able to regain good distal tibial alignment.    Dispo:  To PACU extubated/stable     Dict#  [id number]

## 2018-09-18 NOTE — NURSING
Pt arrived to floor AAO x 4, small bleeding at pinsite. Area covered with ice and elevated. Is instruction provided. fcd on right foot. Call light in reach. Pain medicine given. Whiteboard updated. Will continue to monitor.

## 2018-09-18 NOTE — ANESTHESIA PREPROCEDURE EVALUATION
09/18/2018  lEias Phelps is a 52 y.o., male.    Anesthesia Evaluation    I have reviewed the Patient Summary Reports.    I have reviewed the Nursing Notes.   I have reviewed the Medications.     Review of Systems      Physical Exam  General:  Well nourished    Airway/Jaw/Neck:  Airway Findings: Mouth Opening: Normal Tongue: Normal  General Airway Assessment: Average  Mallampati: III  Improves to II with phonation.  TM Distance: Normal, at least 6 cm  Jaw/Neck Findings:  Neck ROM: Normal ROM            Mental Status:  Mental Status Findings:  Alert and Oriented       Patient Active Problem List   Diagnosis    Open pilon fracture, left, type I or II, initial encounter         Anesthesia Plan  Type of Anesthesia, risks & benefits discussed:  Anesthesia Type:  general, MAC, regional  Patient's Preference:   Intra-op Monitoring Plan: standard ASA monitors  Intra-op Monitoring Plan Comments:   Post Op Pain Control Plan:   Post Op Pain Control Plan Comments:   Induction:   IV  Beta Blocker:  Patient is not currently on a Beta-Blocker (No further documentation required).       Informed Consent: Patient understands risks and agrees with Anesthesia plan.  Questions answered. Anesthesia consent signed with patient.  ASA Score: 2     Day of Surgery Review of History & Physical:    H&P update referred to the surgeon.         Ready For Surgery From Anesthesia Perspective.

## 2018-09-18 NOTE — TRANSFER OF CARE
"Anesthesia Transfer of Care Note    Patient: Elias Phelps    Procedure(s) Performed: Procedure(s) (LRB):  REVISION, OF EXTERNAL FIXATION (Right)  CLOSED REDUCTION, TIBIA (Right)  REMOVAL, FOREIGN BODY, LOWER EXTREMITY (Right)    Patient location: PACU    Anesthesia Type: general    Transport from OR: Transported from OR on 6-10 L/min O2 by face mask with adequate spontaneous ventilation    Post pain: adequate analgesia    Post assessment: no apparent anesthetic complications    Post vital signs: stable    Level of consciousness: sedated    Nausea/Vomiting: no nausea/vomiting    Complications: none    Transfer of care protocol was followed      Last vitals:   Visit Vitals  /61 (BP Location: Left arm, Patient Position: Lying)   Pulse 77   Temp 37 °C (98.6 °F) (Oral)   Resp 15   Ht 5' 5" (1.651 m)   Wt 72.6 kg (160 lb)   SpO2 100%   BMI 26.63 kg/m²     "

## 2018-09-18 NOTE — ANESTHESIA PROCEDURE NOTES
Saphenous Singe injection    Patient location during procedure: pre-op   Block not for primary anesthetic.  Reason for block: at surgeon's request and post-op pain management   Post-op Pain Location: Left ankle pain  Start time: 9/18/2018 6:21 AM  Timeout: 9/18/2018 6:20 AM   End time: 9/18/2018 6:40 AM  Staffing  Anesthesiologist: Evy Leblanc MD  Resident/CRNA: Evan Veloz MD  Performed: resident/CRNA   Preanesthetic Checklist  Completed: patient identified, site marked, surgical consent, pre-op evaluation, timeout performed, IV checked, risks and benefits discussed and monitors and equipment checked  Peripheral Block  Patient position: supine  Prep: ChloraPrep  Patient monitoring: heart rate, cardiac monitor, continuous pulse ox, continuous capnometry and frequent blood pressure checks  Block type: popliteal  Laterality: left  Injection technique: single shot  Needle  Needle type: Stimuplex   Needle gauge: 21 G  Needle length: 4 in  Needle localization: anatomical landmarks and ultrasound guidance   -ultrasound image captured on disc.  Assessment  Injection assessment: negative aspiration, negative parasthesia and local visualized surrounding nerve  Paresthesia pain: none  Heart rate change: no  Slow fractionated injection: yes  Additional Notes  VSS.  [facility] RN monitoring vitals throughout procedure.  Patient tolerated procedure well.

## 2018-09-18 NOTE — NURSING TRANSFER
Nursing Transfer Note      9/18/2018     Transfer To: 553a    Transfer via stretcher    Transfer with LLE elevated w/ice    Transported by pct    Medicines sent: none    Chart send with patient: Yes    Notified: spouse

## 2018-09-18 NOTE — OP NOTE
DATE OF PROCEDURE:  09/18/2018    PREOPERATIVE DIAGNOSES:  Grade II open right tibial pilon with fibula fracture,   status post open reduction internal fixation of fibula and partial tibia with   external fixation of tibia and I and D.    POSTOPERATIVE DIAGNOSES:  Grade II open right tibial pilon with fibula fracture,   status post open reduction internal fixation of fibula and partial tibia with   external fixation of tibia and I and D.    PROCEDURES PERFORMED:  1.  Revision of external fixation of right tibial pilon fracture, 20694.  2.  Closed treatment of right tibial pilon fracture with manipulation under   anesthesia, 99585.  3.  Removal of antibiotic cement spacer deep right tibia, 20680.  4.  Non-excisional debridement and irrigation of open right tibial pilon   fracture, 08317.    SURGEON:  Bharathi Shearer M.D.    ASSISTANT:  Sherry Flores.    ANESTHESIA:  General endotracheal.    ESTIMATED BLOOD LOSS:  5 mL.    IV FLUIDS:  1500 mL crystalloid.    IMPLANTS:  External hardware Marti ex-fix and one medium Infuse sponge BMP-2.    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  He was treated initially at [facility]   with open reduction and internal fixation of fibula fracture, spanning external   fixation, debridement and irrigation and placement of a deep antibiotic spacer.    The patient showed up in my clinic at 31 days after that surgery, at which   point, we removed his sutures and I had a long discussion with him about the   options.  His medial wound precludes any medial plate fixation, which is really   what the fracture would be amenable to.  There was some initial plan on extreme   nailing of his very distal fracture and I explained to him that over a month out   from external fixator pin placement, it would be a very high risk for   infection,  especially in conjunction with his open fracture.  At this point, I   am taking him to the Operating Room to get a better idea of what his medial   wound looks like, try and revise his external fixation given that his distal   tibia has fallen into varus and medial displacement and remove his antibiotic   spacer.  We will plan to leave all options open after that point in time and I   will have further discussions with him.  Informed consent was obtained.    PROCEDURE IN DETAIL:  The patient was identified in the preoperative holding   area and the site was marked.  Regional analgesia was performed.  The patient   was wheeled into the Operating Room and placed on the operating table in supine   position.  General endotracheal anesthesia was induced.  Preoperative   antibiotics were administered.  A timeout was undertaken to confirm patient,   site, side, surgery, surgeon and administration of preoperative antibiotics.    All agreed and we proceeded.    I began before prepping using alcohol sponges and I took all the scab and dried   blood off of the medial wound as well as his lateral incision.  At this point,   it was clear that the medial wound was a longitudinal more proximal wound with   zigzagging horizontal and then a vertical again into the anteromedial aspect of   the tibia.  This open wound trajectory would really preclude any type of easy   fixation with plate and screws of the tibia.  At this point, I used fluoroscopy   to localize the antibiotic cement spacer and I took the distal leg more   anteromedial of his prior wound and incised this through skin and subcutaneous   tissues creating a full flap.  I dissected down to the level of the antibiotic   spacer in opening of the fracture.  I used a Bingham elevator and osteotome to   loosen the antibiotic spacer from the surrounding tissue and I was able to pull   it out en bloc.  I discarded this and then irrigated the area.  I then curetted   out the  entirety of the open fracture and again irrigated with copious amounts   of normal saline solution.  At this point in time, I soaked all the clamps in   Betadine and removed the bars and clamps from the external fixator.  The   calcaneal pin was at this point loose.  I removed the calcaneal pin.  At this   point, I removed the tibial pins as well because if we decide to proceed with   any kind of intramedullary nail fixation, after this, I will have enough time   for those holes to begin to close off and have to have new pin holes with less   than two week duration.    At this point, I placed two separate tibial pins offset from the originals, but   in line with the 4-pin clamp from the Tiny ex-fix.  I used two stab   incisions to drill holes, placed my pins and checked them under fluoroscopy.  I   am a pretty more anterior centrally threaded calcaneal pin as well.  I then made   a medial bar construct first given his varus and I was able to pull him out   almost all the way, but he still was overlapped at one spike of bone proximally   on the lateral side of the tibia at the fracture site.  I took a tamp, placed   this in the wound and tamped that apex over effectively breaking it, which is   fine because I what his bone to heal and that allowed me to get the lateral   translation I needed to better align his distal tibia.  I then built a medial   and then a lateral bar construct and was able to get good alignment in mortise   and lateral views.  I tightened all the bars.  At this point, I brought   fluoroscopy back in and placed a first metatarsal base and a fifth metatarsal   base pin in preparation for including the foot in case we decide on definitive   external fixation as the right answer for this patient.  Prior to placing those   bars, I wanted to do with the fracture site to keep the external fixator out of   my way.    I again irrigated and curetted the fracture site.  I then placed a medium Infuse    sponge into the fracture area and then proximal and distal.  I then placed a   gram of vancomycin powder within that wound.  I then closed it with deep 3-0   Vicryl sutures and an inverted 3-0 Vicryl suture and the subcuticular tissue and   then vertical mattress 3-0 nylon sutures in the skin with a good tension-free   closure.    After closing the wound, I then built the foot into the external fixator keeping   him out of equinus to make sure that he still had good alignment.  After this,   I curetted out his prior ex-fix pin holes in the tibia and the calcaneus and   placed antibiotic powder of these as well.  I placed Hibiclens soaked scrub   sponges over the new pins and also covering the old pin sites, overwrapped these   with Kerlix.    All instrument and sponge counts were reported correct at the end of the case.    There were no complications.  The patient was extubated, awakened, and taken to   the Recovery Room in stable condition.    PLAN FOR THE PATIENT:  Having a discussion at this point in time, I think even   with switching out the pins in his open fracture he does stand a fairly   significant risk of having infection with intramedullary nail fixation if that   is an option.  The other option is definitive external fixation trying to get   him to heal.  I would like to try and get him on teriparatide or Forteo in order   to help with his healing process and I placed BMP within his wound.  I will   again discuss the options with the patient and see which way he wants to go with   this.      SHANDA  dd: 09/18/2018 10:16:32 (CDT)  td: 09/18/2018 17:25:45 (CDT)  Doc ID   #[id number]  Job ID #[id number]    CC:

## 2018-09-18 NOTE — INTERVAL H&P NOTE
No change in H&P.  Open left tibial pilon with fibula fracture status post ORIF fibula, abx spacer placement and ex fix.  To OR today to clean up medial wound, see if I can change position on ex fix and possibly remove abx spacer.  The risks, benefits and alternatives to surgery were discussed with the patient at great length.  These include bleeding, infection, vessel/nerve damage, pain, numbness, tingling, complex regional pain syndrome, hardware/surgical failure, need for further surgery, malunion, nonunion, DVT, PE, arthritis and death.  Patient states an understanding and wishes to proceed with surgery.   All questions were answered.  No guarantees were implied or stated.  Informed consent was obtained.    Bharathi Shearer MD

## 2018-09-18 NOTE — ANESTHESIA PROCEDURE NOTES
Adductor Canal Single Injection Block    Patient location during procedure: pre-op   Block not for primary anesthetic.  Reason for block: at surgeon's request and post-op pain management   Post-op Pain Location: Left ankle  Start time: 9/18/2018 6:21 AM  Timeout: 9/18/2018 6:20 AM   End time: 9/18/2018 6:40 AM  Staffing  Anesthesiologist: Evy Leblanc MD  Resident/CRNA: Evan Veloz MD  Performed: resident/CRNA   Preanesthetic Checklist  Completed: patient identified, site marked, surgical consent, pre-op evaluation, timeout performed, IV checked, risks and benefits discussed and monitors and equipment checked  Peripheral Block  Patient position: supine  Prep: ChloraPrep  Patient monitoring: heart rate, cardiac monitor, continuous pulse ox, continuous capnometry and frequent blood pressure checks  Block type: adductor canal  Laterality: left  Injection technique: single shot  Needle  Needle type: Stimuplex   Needle gauge: 21 G  Needle length: 4 in  Needle localization: anatomical landmarks and ultrasound guidance   -ultrasound image captured on disc.  Assessment  Injection assessment: negative aspiration, negative parasthesia and local visualized surrounding nerve  Paresthesia pain: none  Heart rate change: no  Slow fractionated injection: yes  Additional Notes  Bupivacaine 0.375%-Epi 1:300K 15cc  VSS.  [facility] RN monitoring vitals throughout procedure.  Patient tolerated procedure well.

## 2018-09-19 ENCOUNTER — ANESTHESIA (OUTPATIENT)
Dept: SURGERY | Facility: HOSPITAL | Age: 53
End: 2018-09-19
Payer: MEDICARE

## 2018-09-19 ENCOUNTER — ANESTHESIA EVENT (OUTPATIENT)
Dept: SURGERY | Facility: HOSPITAL | Age: 53
End: 2018-09-19
Payer: MEDICARE

## 2018-09-19 DIAGNOSIS — M81.0 OSTEOPOROSIS, UNSPECIFIED OSTEOPOROSIS TYPE, UNSPECIFIED PATHOLOGICAL FRACTURE PRESENCE: ICD-10-CM

## 2018-09-19 DIAGNOSIS — M81.6 LOCALIZED OSTEOPOROSIS OF LEQUESNE: ICD-10-CM

## 2018-09-19 DIAGNOSIS — M80.80XA PATHOLOGICAL FRACTURE DUE TO OSTEOPOROSIS, UNSPECIFIED FRACTURE SITE, UNSPECIFIED OSTEOPOROSIS TYPE, INITIAL ENCOUNTER: Primary | ICD-10-CM

## 2018-09-19 PROBLEM — S82.872B: Status: RESOLVED | Noted: 2018-09-07 | Resolved: 2018-09-19

## 2018-09-19 PROCEDURE — 25000003 PHARM REV CODE 250: Performed by: STUDENT IN AN ORGANIZED HEALTH CARE EDUCATION/TRAINING PROGRAM

## 2018-09-19 PROCEDURE — 97165 OT EVAL LOW COMPLEX 30 MIN: CPT

## 2018-09-19 PROCEDURE — 63600175 PHARM REV CODE 636 W HCPCS: Performed by: STUDENT IN AN ORGANIZED HEALTH CARE EDUCATION/TRAINING PROGRAM

## 2018-09-19 PROCEDURE — 97116 GAIT TRAINING THERAPY: CPT

## 2018-09-19 PROCEDURE — 12000002 HC ACUTE/MED SURGE SEMI-PRIVATE ROOM

## 2018-09-19 PROCEDURE — 25000003 PHARM REV CODE 250: Performed by: ORTHOPAEDIC SURGERY

## 2018-09-19 PROCEDURE — G0378 HOSPITAL OBSERVATION PER HR: HCPCS

## 2018-09-19 PROCEDURE — 97161 PT EVAL LOW COMPLEX 20 MIN: CPT

## 2018-09-19 PROCEDURE — 63600175 PHARM REV CODE 636 W HCPCS: Performed by: ORTHOPAEDIC SURGERY

## 2018-09-19 RX ORDER — PREGABALIN 150 MG/1
150 CAPSULE ORAL NIGHTLY
Status: DISCONTINUED | OUTPATIENT
Start: 2018-09-19 | End: 2018-09-21 | Stop reason: HOSPADM

## 2018-09-19 RX ORDER — METHOCARBAMOL 750 MG/1
750 TABLET, FILM COATED ORAL 3 TIMES DAILY
Status: DISCONTINUED | OUTPATIENT
Start: 2018-09-19 | End: 2018-09-21 | Stop reason: HOSPADM

## 2018-09-19 RX ORDER — SULFAMETHOXAZOLE AND TRIMETHOPRIM 800; 160 MG/1; MG/1
1 TABLET ORAL 2 TIMES DAILY
Qty: 14 TABLET | Refills: 0 | Status: SHIPPED | OUTPATIENT
Start: 2018-09-19 | End: 2018-09-26

## 2018-09-19 RX ORDER — TERIPARATIDE 250 UG/ML
20 INJECTION, SOLUTION SUBCUTANEOUS DAILY
Qty: 28.8 ML | Refills: 0 | Status: SHIPPED | OUTPATIENT
Start: 2018-09-19 | End: 2019-09-25

## 2018-09-19 RX ORDER — HYDROMORPHONE HYDROCHLORIDE 1 MG/ML
1.5 INJECTION, SOLUTION INTRAMUSCULAR; INTRAVENOUS; SUBCUTANEOUS ONCE
Status: COMPLETED | OUTPATIENT
Start: 2018-09-19 | End: 2018-09-19

## 2018-09-19 RX ORDER — OXYCODONE HYDROCHLORIDE 10 MG/1
20 TABLET ORAL EVERY 4 HOURS PRN
Status: DISCONTINUED | OUTPATIENT
Start: 2018-09-19 | End: 2018-09-20

## 2018-09-19 RX ORDER — HYDROMORPHONE HYDROCHLORIDE 1 MG/ML
0.5 INJECTION, SOLUTION INTRAMUSCULAR; INTRAVENOUS; SUBCUTANEOUS
Status: DISCONTINUED | OUTPATIENT
Start: 2018-09-19 | End: 2018-09-20

## 2018-09-19 RX ORDER — METHOCARBAMOL 750 MG/1
750 TABLET, FILM COATED ORAL 3 TIMES DAILY
Qty: 30 TABLET | Refills: 0 | Status: SHIPPED | OUTPATIENT
Start: 2018-09-19 | End: 2018-09-19

## 2018-09-19 RX ORDER — METHOCARBAMOL 750 MG/1
750 TABLET, FILM COATED ORAL 3 TIMES DAILY
Qty: 30 TABLET | Refills: 0 | Status: SHIPPED | OUTPATIENT
Start: 2018-09-19 | End: 2018-09-29

## 2018-09-19 RX ORDER — HYDROMORPHONE HYDROCHLORIDE 1 MG/ML
0.5 INJECTION, SOLUTION INTRAMUSCULAR; INTRAVENOUS; SUBCUTANEOUS EVERY 4 HOURS PRN
Status: DISCONTINUED | OUTPATIENT
Start: 2018-09-19 | End: 2018-09-19

## 2018-09-19 RX ORDER — OXYCODONE HYDROCHLORIDE 10 MG/1
10 TABLET ORAL ONCE
Status: COMPLETED | OUTPATIENT
Start: 2018-09-19 | End: 2018-09-19

## 2018-09-19 RX ADMIN — METHOCARBAMOL 750 MG: 750 TABLET ORAL at 03:09

## 2018-09-19 RX ADMIN — VANCOMYCIN HYDROCHLORIDE 1000 MG: 1 INJECTION, POWDER, LYOPHILIZED, FOR SOLUTION INTRAVENOUS at 12:09

## 2018-09-19 RX ADMIN — Medication 0.5 MG: at 01:09

## 2018-09-19 RX ADMIN — OXYCODONE HYDROCHLORIDE 10 MG: 10 TABLET ORAL at 04:09

## 2018-09-19 RX ADMIN — APIXABAN 5 MG: 2.5 TABLET, FILM COATED ORAL at 08:09

## 2018-09-19 RX ADMIN — OXYCODONE HYDROCHLORIDE 10 MG: 10 TABLET ORAL at 07:09

## 2018-09-19 RX ADMIN — VANCOMYCIN HYDROCHLORIDE 1000 MG: 1 INJECTION, POWDER, LYOPHILIZED, FOR SOLUTION INTRAVENOUS at 11:09

## 2018-09-19 RX ADMIN — CEFAZOLIN 2 G: 330 INJECTION, POWDER, FOR SOLUTION INTRAMUSCULAR; INTRAVENOUS at 11:09

## 2018-09-19 RX ADMIN — Medication 0.5 MG: at 05:09

## 2018-09-19 RX ADMIN — ACETAMINOPHEN 1000 MG: 500 TABLET ORAL at 05:09

## 2018-09-19 RX ADMIN — METHOCARBAMOL 750 MG: 750 TABLET ORAL at 12:09

## 2018-09-19 RX ADMIN — CEFAZOLIN 2 G: 330 INJECTION, POWDER, FOR SOLUTION INTRAMUSCULAR; INTRAVENOUS at 02:09

## 2018-09-19 RX ADMIN — DOCUSATE SODIUM 50 MG: 50 CAPSULE, LIQUID FILLED ORAL at 09:09

## 2018-09-19 RX ADMIN — OXYCODONE HYDROCHLORIDE 20 MG: 10 TABLET ORAL at 11:09

## 2018-09-19 RX ADMIN — Medication 0.5 MG: at 10:09

## 2018-09-19 RX ADMIN — Medication 0.5 MG: at 06:09

## 2018-09-19 RX ADMIN — Medication 0.5 MG: at 02:09

## 2018-09-19 RX ADMIN — AMLODIPINE BESYLATE 10 MG: 10 TABLET ORAL at 08:09

## 2018-09-19 RX ADMIN — ACETAMINOPHEN 1000 MG: 500 TABLET ORAL at 08:09

## 2018-09-19 RX ADMIN — Medication 1.5 MG: at 08:09

## 2018-09-19 RX ADMIN — OXYCODONE HYDROCHLORIDE 10 MG: 10 TABLET ORAL at 11:09

## 2018-09-19 RX ADMIN — OXYCODONE HYDROCHLORIDE 10 MG: 10 TABLET ORAL at 09:09

## 2018-09-19 RX ADMIN — LOSARTAN POTASSIUM 25 MG: 25 TABLET, FILM COATED ORAL at 08:09

## 2018-09-19 RX ADMIN — OXYCODONE HYDROCHLORIDE 10 MG: 10 TABLET ORAL at 08:09

## 2018-09-19 RX ADMIN — CEFAZOLIN 2 G: 330 INJECTION, POWDER, FOR SOLUTION INTRAMUSCULAR; INTRAVENOUS at 06:09

## 2018-09-19 RX ADMIN — PREGABALIN 150 MG: 150 CAPSULE ORAL at 08:09

## 2018-09-19 RX ADMIN — METHOCARBAMOL 750 MG: 750 TABLET ORAL at 08:09

## 2018-09-19 NOTE — ANESTHESIA POSTPROCEDURE EVALUATION
"Anesthesia Post Evaluation    Patient: Elias Phelps    Procedure(s) Performed: Procedure(s) (LRB):  REVISION, OF EXTERNAL FIXATION (Right)  CLOSED REDUCTION, TIBIA (Right)  REMOVAL, FOREIGN BODY, LOWER EXTREMITY (Right)    Final Anesthesia Type: general  Patient location during evaluation: PACU  Patient participation: Yes- Able to Participate  Level of consciousness: awake and alert and oriented  Post-procedure vital signs: reviewed and stable  Pain management: adequate  Airway patency: patent  PONV status at discharge: No PONV  Anesthetic complications: no      Cardiovascular status: stable  Respiratory status: unassisted  Hydration status: euvolemic  Follow-up not needed.        Visit Vitals  /68 (BP Location: Left arm, Patient Position: Lying)   Pulse 78   Temp 36.7 °C (98 °F) (Oral)   Resp 18   Ht 5' 5" (1.651 m)   Wt 72.6 kg (160 lb)   SpO2 95%   BMI 26.63 kg/m²       Pain/Kenny Score: Pain Assessment Performed: Yes (9/19/2018  3:00 AM)  Presence of Pain: complains of pain/discomfort (9/19/2018  3:00 AM)  Pain Rating Prior to Med Admin: 9 (9/19/2018 10:15 AM)  Pain Rating Post Med Admin: 3 (9/18/2018 12:30 PM)  Kenny Score: 9 (9/18/2018 12:30 PM)        "

## 2018-09-19 NOTE — PLAN OF CARE
Problem: Physical Therapy Goal  Goal: Physical Therapy Goal  Goals to be met by: 18     Patient will increase functional independence with mobility by performin. Supine to sit with min (A)  2. Sit to supine with min (A)  3. Sit to stand transfer with min (A) using appropriate AD  4. Bed to chair transfer with min (A) using appropriate AD  5. Gait  x 50 feet with min (A) using appropriate AD.   6. Lower extremity exercise program x20-30 reps per handout, with assistance as needed.      Outcome: Ongoing (interventions implemented as appropriate)  PT eval complete. Pt tolerated session well today with no complications but was limited in activity tolerance by pain. Pt able to walk to door and back to bed using RW with CGA and no LOB. Pt able to maintain NWB precautions during ambulation trial. Pt will cont to benefit from skilled therapy services and is appropriate for HHPT upon d/c.

## 2018-09-19 NOTE — PLAN OF CARE
[facility]    HOME HEALTH ORDERS  FACE TO FACE ENCOUNTER    Patient Name: Elias Phelps  YOB: 1965    PCP: Primary Doctor No   PCP Address: None  PCP Phone Number: None  PCP Fax: None    Encounter Date: 09/19/2018    Admit to Home Health    Diagnoses:  Active Hospital Problems    Diagnosis  POA    Open displaced pilon fracture of left tibia [E86.135U]  Yes      Resolved Hospital Problems    Diagnosis Date Resolved POA    *Open pilon fracture, left, type I or II, initial encounter [R77.821N] 09/19/2018 Yes       No future appointments.        I have seen and examined this patient face to face today. My clinical findings that support the need for the home health skilled services and home bound status are the following:  Weakness/numbness causing balance and gait disturbance due to Fracture making it taxing to leave home.    Allergies:Review of patient's allergies indicates:  No Known Allergies    Diet: regular diet    Activities: NWB LLE    Nursing:   SN to complete comprehensive assessment including routine vital signs. Instruct on disease process and s/s of complications to report to MD. Follow specific home health arthoplasty protocol. Review/verify medication list sent home with the patient at time of discharge  and instruct patient/caregiver as needed. If coumadin ordered, coumadin clinic to manage INR with INR draws 2x per week with a goal to maintain INR between 1.8 and 2.2. Frequency may be adjusted depending on start of care date.    Notify MD if SBP > 160 or < 90; DBP > 90 or < 50; HR > 120 or < 50; Temp > 101    Home Medical Equipment:  Walker, 3-1 bedside commode, transfer tub bench    CONSULTS:    Physical Therapy to evaluate and treat. Evaluate for home safety and equipment needs; Establish/upgrade home exercise program. Perform / instruct on therapeutic exercises, gait training, transfer training, and Range of Motion.    OTHER:  Occupational Therapy to evaluate and  treat. Evaluate home environment for safety and equipment needs. Perform/Instruct on transfers, ADL training, ROM, and therapeutic exercises.   to evaluate for community resources/long-range planning.      WOUND CARE ORDERS  PIN SITE CARE TWICE DAILY MIX 50% PEROXIDE WITH 50% NORMAL SALINE AND CLEAN ALL PIN SITES WITH GAUZE SOAKED IN SOLUTION. WRAP PIN SITES WITH DRY KERLEX.        Medications: Review discharge medications with patient and family and provide education.      Current Discharge Medication List      START taking these medications    Details   docusate sodium (COLACE) 100 MG capsule Take 1 capsule (100 mg total) by mouth 2 (two) times daily as needed for Constipation.  Qty: 60 capsule, Refills: 0      ondansetron (ZOFRAN-ODT) 8 MG TbDL Take 1 tablet (8 mg total) by mouth every 6 (six) hours as needed.  Qty: 30 tablet, Refills: 0      oxyCODONE-acetaminophen (PERCOCET) 5-325 mg per tablet Take 1 tablet by mouth every 4 (four) hours as needed.  Qty: 41 tablet, Refills: 0      teriparatide (FORTEO) 20 mcg/dose - 600 mcg/2.4 mL PnIj Inject 0.08 mLs (20 mcg total) into the skin once daily.  Qty: 28.8 mL, Refills: 0         CONTINUE these medications which have NOT CHANGED    Details   amLODIPine (NORVASC) 10 MG tablet Take 10 mg by mouth once daily.       losartan (COZAAR) 50 MG tablet       oxyCODONE-acetaminophen (PERCOCET)  mg per tablet Take 1 tablet by mouth every 4 to 6 hours as needed for Pain.  Qty: 42 tablet, Refills: 0      sulfamethoxazole-trimethoprim 800-160mg (BACTRIM DS) 800-160 mg Tab Take 1 tablet by mouth 2 (two) times daily. for 7 days  Qty: 14 tablet, Refills: 0      ELIQUIS 5 mg Tab Take 5 mg by mouth 2 (two) times daily.              I certify that this patient is confined to his home and needs intermittent skilled nursing care and physical therapy.

## 2018-09-19 NOTE — ASSESSMENT & PLAN NOTE
Elias Phelps is a 52 y.o. male s/p revision of exfix for left open pilon fx  Pain control: oxycodone PRN  PT/OT: NWB LLE  DVT PPx: retarted home eliquus 5mg BID  Abx: postop Ancef and vanc, will d/c with  Bactrim for SSTI  Nursing:: pin site care BID, aggressive ice and elevation of RLE    Dispo: f/u PT recs

## 2018-09-19 NOTE — PLAN OF CARE
POD 1 s/p RLE foreign body removal, ex-fix removal and revision, right tibia closed reduction. PT/OT ordered to eval and treat. PT/OT recs pending. Patient currently lives with his spouse. Patient has good family support at home. CM completed discharge assessment and planning with patient. Patient verbalized understanding. All questions and concerns addressed. SW and CM will continue to follow for any additional needs. Plan A to discharge home with home health as soon as medically stable. Plan B to discharge to SNF.    PCP: Primary Doctor No- Patient stated he see's Dr. Miranda at [facility] in [facility].    Pharmacy:   [facility] - [place], [place] - [address] AT [facility]  [address]  [facility] [address]  Phone: [phone] Fax: [phone]    [facility]  [address] [facility] [address]  Phone: [phone] Fax: [phone]    Payor: [employer] MEDICARE / Plan: [employer] (SPECIAL NEEDS PLAN) / Product Type: Medicare Advantage /      09/19/18 0755   Discharge Assessment   Assessment Type Discharge Planning Assessment   Confirmed/corrected address and phone number on facesheet? Yes   Assessment information obtained from? Patient;Medical Record   Expected Length of Stay (days) 4   Communicated expected length of stay with patient/caregiver yes   Prior to hospitilization cognitive status: Alert/Oriented   Prior to hospitalization functional status: Independent   Current cognitive status: Alert/Oriented   Current Functional Status: Assistive Equipment   Lives With spouse   Able to Return to Prior Arrangements yes   Is patient able to care for self after discharge? Yes   Who are your caregiver(s) and their phone number(s)? spouse- Dana Phelps [phone]   Patient's perception of discharge disposition home health   Readmission Within The Last 30 Days no previous admission in last 30 days   Patient currently being followed by outpatient case  management? No   Patient currently receives any other outside agency services? No   Equipment Currently Used at Home walker, rolling   Do you have any problems affording any of your prescribed medications? No   Is the patient taking medications as prescribed? yes   Does the patient have transportation home? Yes   Transportation Available family or friend will provide   Does the patient receive services at the Coumadin Clinic? No   Discharge Plan A Home Health;Home with family   Discharge Plan B Skilled Nursing Facility   Patient/Family In Agreement With Plan yes

## 2018-09-19 NOTE — PLAN OF CARE
Problem: Occupational Therapy Goal  Goal: Occupational Therapy Goal  Goals to be met by: 10/2/2018     Patient will increase functional independence with ADLs by performing:    UE Dressing with [place].  LE Dressing with Minimal Assistance.  Grooming while standing at sink with Minimal Assistance.  Toileting from bedside commode with Minimal Assistance for hygiene and clothing management.   Toilet transfer to bedside commode with Stand-by Assistance.    Initiate OT POC     Comments: Edu Painter OTR/L  9/19/2018

## 2018-09-19 NOTE — PLAN OF CARE
CM ordered a wheelchair with elevated leg rests and a bedside commode per MD order. CM ordered DME from [facility] with [facility]. DME for bedside delivery.    CM sent HH orders to [facility] per patient request. Plans for patient to discharge today. [facility] added to patient's AVS.    Addendum on 9/19/18 at 1314: CM noted [facility] denied due to non [facility]. CM sent referral to [facility]. [place]  added to patient AVS.    Marianna Singh RN, CM [facility]  [phone]

## 2018-09-19 NOTE — PT/OT/SLP EVAL
Physical Therapy Evaluation    Patient Name:  Elias Phelps   MRN:  [id number]    Recommendations:     Discharge Recommendations:  home health PT   Discharge Equipment Recommendations: bedside commode, shower chair   Barriers to discharge: None    Assessment:     Elias Phelps is a 52 y.o. male admitted with a medical diagnosis of Open pilon fracture, left, type I or II, initial encounter.  He presents with the following impairments/functional limitations:  weakness, impaired endurance, impaired self care skills, gait instability, pain, impaired functional mobilty, impaired joint extensibility, impaired skin, decreased lower extremity function, decreased ROM, orthopedic precautions, impaired balance.    -PT eval complete. Pt tolerated session well today with no complications but was limited in activity tolerance by pain. Pt able to walk to door and back to bed using RW with CGA and no LOB. Pt able to maintain NWB precautions during ambulation trial. Pt will cont to benefit from skilled therapy services and is appropriate for HHPT upon d/c.    Rehab Prognosis:  Good; patient would benefit from acute skilled PT services to address these deficits and reach maximum level of function.      Recent Surgery: Procedure(s) (LRB):  REVISION, OF EXTERNAL FIXATION (Right)  CLOSED REDUCTION, TIBIA (Right)  REMOVAL, FOREIGN BODY, LOWER EXTREMITY (Right) 1 Day Post-Op    Plan:     During this hospitalization, patient to be seen daily to address the above listed problems via gait training, therapeutic activities, therapeutic exercises, neuromuscular re-education  · Plan of Care Expires:  10/19/18   Plan of Care Reviewed with: patient    Subjective     Communicated with nsg prior to session.  Patient found supine upon PT entry to room, agreeable to evaluation.      Chief Complaint: impaired functional mobility  Patient comments/goals: return home to [facility]  Pain/Comfort:  · Pain Rating 1: 10/10  · Location - Side 1: Left  · Location -  Orientation 1: generalized  · Location 1: leg  · Pain Addressed 1: Pre-medicate for activity, Cessation of Activity, Reposition, Distraction  · Pain Rating Post-Intervention 1: 10/10    Patients cultural, spiritual, Mosque conflicts given the current situation: none stated    Living Environment:  -Pt lives with his mother in a 2SH with no MILADY and can live on the first floor. Pt's mother works during the day and pt will have no assistance during the day upon d/c. Pt has a tub/shower combo with no seat.  Prior to admission, patients level of function was mod (I).  Patient has the following equipment: walker, rolling.  DME owned (not currently used): .  Upon discharge, patient will have assistance from undetermined.    Objective:     Patient found with: external fixator     General Precautions: Standard, fall   Orthopedic Precautions:LLE non weight bearing   Braces: N/A     Exams:  · Sensation:    · -       Intact  light/touch (B) LE  · RLE ROM: WFL  · RLE Strength: WFL  · LLE ROM: WFL except ankle JONI 2/2 ex-fix  · LLE Strength: JONI 2/2 pain    Functional Mobility:  · Bed Mobility:     · Supine to Sit: moderate assistance  · Transfers:     · Sit to Stand:  minimum assistance with rolling walker  · Gait: 20' using RW with min (A). Pt able to maintain NWB precautions    AM-PAC 6 CLICK MOBILITY  Total Score:16       Therapeutic Activities and Exercises:   -Pt educated on:  A. PT POC and role of PT  B. Importance of OOB activity to improve functional outcomes after surgery  C. Maintaining WBing status on affected LE during mobility  D. DME mgmt and t/f sequencing  E. Gait sequencing  F. Performing HEP to reduce the risk of developing blood clots       Patient left supine with all lines intact, call button in reach and nsg notified.    GOALS:   Multidisciplinary Problems     Physical Therapy Goals        Problem: Physical Therapy Goal    Goal Priority Disciplines Outcome Goal Variances Interventions   Physical Therapy  Goal     PT, PT/OT Ongoing (interventions implemented as appropriate)     Description:  Goals to be met by: 18     Patient will increase functional independence with mobility by performin. Supine to sit with min (A)  2. Sit to supine with min (A)  3. Sit to stand transfer with min (A) using appropriate AD  4. Bed to chair transfer with min (A) using appropriate AD  5. Gait  x 50 feet with min (A) using appropriate AD.   6. Lower extremity exercise program x20-30 reps per handout, with assistance as needed.                        History:     Past Medical History:   Diagnosis Date    Hypertension     Pulmonary embolism            Past Surgical History:   Procedure Laterality Date    CLOSED REDUCTION OF INJURY OF TIBIA Right 2018    Procedure: CLOSED REDUCTION, TIBIA;  Surgeon: Bharathi Shearer MD;  Location: [facility] OR [address] [place] [address];  Service: Orthopedics;  Laterality: Right;    CLOSED REDUCTION, TIBIA Right 2018    Performed by Bharathi Shearer MD at [facility] OR [address] [place] [address]    ELBOW SURGERY      left elbow    FIBULA FRACTURE SURGERY Left     GALLBLADDER SURGERY N/A     REMOVAL OF FOREIGN BODY FROM LOWER EXTREMITY Right 2018    Procedure: REMOVAL, FOREIGN BODY, LOWER EXTREMITY;  Surgeon: Bharathi Shearer MD;  Location: [address];  Service: Orthopedics;  Laterality: Right;    REMOVAL, FOREIGN BODY, LOWER EXTREMITY Right 2018    Performed by Bharathi Shearer MD at [facility] OR [address] [place] [address]    REVISION OF PROCEDURE INVOLVING EXTERNAL FIXATION DEVICE Right 2018    Procedure: REVISION, OF EXTERNAL FIXATION;  Surgeon: Bharathi Shearer MD;  Location: [address];  Service: Orthopedics;  Laterality: Right;    REVISION, OF EXTERNAL FIXATION Right 2018    Performed by Bharathi Shearer MD at [facility] OR [address] [place] [address]       Clinical Decision Making:     History  Co-morbidities and personal factors that may impact the plan of care Examination  Body Structures and Functions, activity  limitations and participation restrictions that may impact the plan of care Clinical Presentation   Decision Making/ Complexity Score   Co-morbidities:   [] Time since onset of injury / illness / exacerbation  [x] Status of current condition  []Patient's cognitive status and safety concerns    [] Multiple Medical Problems (see med hx)  Personal Factors:   [] Patient's age  [] Prior Level of function   [x] Patient's home situation (environment and family support)  [] Patient's level of motivation  [] Expected progression of patient      HISTORY:(criteria)    [] 71503 - no personal factors/history    [x] 71390 - has 1-2 personal factor/comorbidity     [] 35812 - has >3 personal factor/comorbidity     Body Regions:  [] Objective examination findings  [] Head     []  Neck  [] Trunk   [] Upper Extremity  [x] Lower Extremity    Body Systems:  [] For communication ability, affect, cognition, language, and learning style: the assessment of the ability to make needs known, consciousness, orientation (person, place, and time), expected emotional /behavioral responses, and learning preferences (eg, learning barriers, education  needs)  [x] For the neuromuscular system: a general assessment of gross coordinated movement (eg, balance, gait, locomotion, transfers, and transitions) and motor function  (motor control and motor learning)  [x] For the musculoskeletal system: the assessment of gross symmetry, gross range of motion, gross strength, height, and weight  [x] For the integumentary system: the assessment of pliability(texture), presence of scar formation, skin color, and skin integrity  [] For cardiovascular/pulmonary system: the assessment of heart rate, respiratory rate, blood pressure, and edema     Activity limitations:    [] Patient's cognitive status and saf ety concerns          [] Status of current condition      [x] Weight bearing restriction  [] Cardiopulmunary Restriction    Participation Restrictions:   [] Goals  and goal agreement with the patient     [] Rehab potential (prognosis) and probable outcome      Examination of Body System: (criteria)    [] 69601 - addressing 1-2 elements    [x] 07719 - addressing a total of 3 or more elements     [] 75626 -  Addressing a total of 4 or more elements         Clinical Presentation: (criteria)  Stable - 95088     On examination of body system using standardized tests and measures patient presents with 1-2 elements from any of the following: body structures and functions, activity limitations, and/or participation restrictions.  Leading to a clinical presentation that is considered evolving with changing characteristics                              Clinical Decision Making  (Eval Complexity):  Low- 12073     Time Tracking:     PT Received On: 09/19/18  PT Start Time: 1052     PT Stop Time: 1116  PT Total Time (min): 24 min     Billable Minutes: Evaluation 16 and Gait Training 8      Say Belcher, PT  09/19/2018

## 2018-09-19 NOTE — SUBJECTIVE & OBJECTIVE
"Principal Problem:Open pilon fracture, left, type I or II, initial encounter    Principal Orthopedic Problem: s/p L pilon ex fix revision    Interval History: NAEON. Pain controlled this am. Aggressive ice and elevation overnight.     Review of patient's allergies indicates:  No Known Allergies    Current Facility-Administered Medications   Medication    acetaminophen tablet 1,000 mg    amLODIPine tablet 10 mg    apixaban tablet 5 mg    ceFAZolin injection 2 g    docusate sodium capsule 50 mg    HYDROmorphone injection 0.5 mg    losartan tablet 25 mg    ondansetron tablet 4 mg    oxyCODONE immediate release tablet 10 mg    oxyCODONE immediate release tablet 5 mg    vancomycin in dextrose 5 % 1 gram/250 mL IVPB 1,000 mg     Objective:     Vital Signs (Most Recent):  Temp: 98 °F (36.7 °C) (09/19/18 0716)  Pulse: 78 (09/19/18 0716)  Resp: 18 (09/19/18 0716)  BP: 118/68 (09/19/18 0716)  SpO2: 95 % (09/19/18 0716) Vital Signs (24h Range):  Temp:  [96.8 °F (36 °C)-98.3 °F (36.8 °C)] 98 °F (36.7 °C)  Pulse:  [65-91] 78  Resp:  [14-22] 18  SpO2:  [95 %-100 %] 95 %  BP: (108-137)/(63-95) 118/68     Weight: 72.6 kg (160 lb)  Height: 5' 5" (165.1 cm)  Body mass index is 26.63 kg/m².      Intake/Output Summary (Last 24 hours) at 9/19/2018 0943  Last data filed at 9/19/2018 0600  Gross per 24 hour   Intake 1232 ml   Output 500 ml   Net 732 ml   Gen: No acute distress  HEENT: normocephalic, atraumatic  CV: regular rate  Chest: symmetric, nonlabored respirations    Ortho/SPM Exam  Ex fix in place RLE  2+ DP and PT pulses  Compartments soft  Moderate diffuse swelling  SILT throughout  Can move toes without difficulty      Significant Imaging: I have reviewed and interpreted all pertinent imaging results/findings.  "

## 2018-09-19 NOTE — PROGRESS NOTES
Patient requesting dilaudid pain medication frequency to be changed from every 6 hours to every 4 hours as needed for pain. Intern on call notified of situation. New order given. Will note response to therapeutic regime.

## 2018-09-19 NOTE — PT/OT/SLP EVAL
Occupational Therapy   Evaluation    Name: Elias Phelps  MRN: [id number]  Admitting Diagnosis:  Open pilon fracture, left, type I or II, initial encounter 1 Day Post-Op    Recommendations:     Discharge Recommendations:    Discharge Equipment Recommendations:  bedside commode, shower chair  Barriers to discharge:  None    History:     Occupational Profile:  Living Environment: Pt lives in a 2sh w/ mother w/ no steps to enter. 1st floor is livable and pt bathes in a tub shower combo.   Previous level of function: Indep w/ ADLs and MOD I for mobility (RW).   Roles and Routines: N/A  Equipment Used at Home:  walker, rolling  Assistance upon Discharge: Pt has assistance upon D/C.     Past Medical History:   Diagnosis Date    Hypertension     Pulmonary embolism     2016       Past Surgical History:   Procedure Laterality Date    CLOSED REDUCTION OF INJURY OF TIBIA Right 9/18/2018    Procedure: CLOSED REDUCTION, TIBIA;  Surgeon: Bharathi Shearer MD;  Location: [address];  Service: Orthopedics;  Laterality: Right;    CLOSED REDUCTION, TIBIA Right 9/18/2018    Performed by Bharathi Shearer MD at [facility] OR [address] [place] [address]    ELBOW SURGERY      left elbow    FIBULA FRACTURE SURGERY Left     GALLBLADDER SURGERY N/A 1991    REMOVAL OF FOREIGN BODY FROM LOWER EXTREMITY Right 9/18/2018    Procedure: REMOVAL, FOREIGN BODY, LOWER EXTREMITY;  Surgeon: Bharathi Shearer MD;  Location: [address];  Service: Orthopedics;  Laterality: Right;    REMOVAL, FOREIGN BODY, LOWER EXTREMITY Right 9/18/2018    Performed by Bharathi Shearer MD at [facility] OR [address] [place] [address]    REVISION OF PROCEDURE INVOLVING EXTERNAL FIXATION DEVICE Right 9/18/2018    Procedure: REVISION, OF EXTERNAL FIXATION;  Surgeon: Bharathi Shearer MD;  Location: [address];  Service: Orthopedics;  Laterality: Right;    REVISION, OF EXTERNAL FIXATION Right 9/18/2018    Performed by Bharathi Shearer MD at [facility] OR [address] [place] [address]       Subjective     Chief Complaint: pain in  LLE  Patient/Family Comments/goals: pain control     Pain/Comfort:  · Pain Rating 1: 10/10  · Location - Side 1: Left  · Location - Orientation 1: generalized  · Location 1: leg  · Pain Addressed 1: Reposition, Distraction, Cessation of Activity  · Pain Rating Post-Intervention 1: 10/10    Patients cultural, spiritual, Anglican conflicts given the current situation:      Objective:     Communicated with: RN prior to session.  Patient found PT present and external fixator upon OT entry to room.    General Precautions: Standard, fall   Orthopedic Precautions:LLE non weight bearing   Braces: N/A     Occupational Performance:    Bed Mobility:    · Patient completed Scooting/Bridging with minimum assistance and LLE management   · Patient completed Supine to Sit with minimum assistance and LLE management   · Patient completed Sit to Supine with minimum assistance and LLE management     Functional Mobility/Transfers:  · Patient completed Sit <> Stand Transfer with contact guard assistance  with  rolling walker   · Functional Mobility: Pt ambulated ~20 ft at sba w/ RW. Pt did well to maintain LLE NWB status.     Activities of Daily Living:  · Upper Body Dressing: minimum assistance donned gown as robe     Cognitive/Visual Perceptual:  Cognitive/Psychosocial Skills:     -       Oriented to: Person, Place, Time and Situation   -       Follows Commands/attention:Follows multistep  commands  -       Communication: clear/fluent  -       Memory: No Deficits noted  -       Safety awareness/insight to disability: intact   -       Mood/Affect/Coping skills/emotional control: Appropriate to situation  Visual/Perceptual:      -Intact      Physical Exam:  Balance:    -       Pt displayed good overall balance in sitting and w/ functional mobility   Postural examination/scapula alignment:    -       Rounded shoulders  Skin integrity: Visible skin intact  Upper Extremity Range of Motion:     -       Right Upper Extremity: WFL    -        "Left Upper Extremity: WFL      Upper Extremity Strength:    -       Right Upper Extremity: WFL  -       Left Upper Extremity: WFL     Strength:    -       Right Upper Extremity: WFL    -       Left Upper Extremity: WFL    Fine Motor Coordination:    -       Intact  Gross motor coordination:   WFL     AMPAC 6 Click ADL:  AMPAC Total Score: 20    Treatment & Education:  Pt educated on LLE NWB status, benefits for oob activities, and POC.   Education:    Patient left HOB elevated with all lines intact, call button in reach and LLE elevated     Assessment:     Elias Phelps is a 52 y.o. male with a medical diagnosis of Open pilon fracture, left, type I or II, initial encounter.  He presents with the following performance deficits affecting function: impaired endurance, impaired self care skills, impaired functional mobilty, gait instability, impaired balance, decreased lower extremity function, decreased ROM, pain, orthopedic precautions, impaired joint extensibility.      Rehab Prognosis: Good; patient would benefit from acute skilled OT services to address these deficits and reach maximum level of function.         Clinical Decision Makin.  OT Low:  "Pt evaluation falls under low complexity for evaluation coding due to performance deficits noted in 1-3 areas as stated above and 0 co-morbities affecting current functional status. Data obtained from problem focused assessments. No modifications or assistance was required for completion of evaluation. Only brief occupational profile and history review completed."     Plan:     Patient to be seen daily to address the above listed problems via self-care/home management, therapeutic activities, therapeutic exercises  · Plan of Care Expires: 10/19/18  · Plan of Care Reviewed with: patient    This Plan of care has been discussed with the patient who was involved in its development and understands and is in agreement with the identified goals and treatment " plan    GOALS:   Multidisciplinary Problems     Occupational Therapy Goals        Problem: Occupational Therapy Goal    Goal Priority Disciplines Outcome Interventions   Occupational Therapy Goal     OT, PT/OT     Description:  Goals to be met by: 10/2/2018     Patient will increase functional independence with ADLs by performing:    UE Dressing with [place].  LE Dressing with Minimal Assistance.  Grooming while standing at sink with Minimal Assistance.  Toileting from bedside commode with Minimal Assistance for hygiene and clothing management.   Toilet transfer to bedside commode with Stand-by Assistance.                      Time Tracking:     OT Date of Treatment: 09/19/18  OT Start Time: 1101  OT Stop Time: 1114  OT Total Time (min): 13 min    Billable Minutes:Evaluation 13 minutes     Edu Painter, OT  9/19/2018

## 2018-09-19 NOTE — PROGRESS NOTES
"[facility]  Orthopedics  Progress Note    Patient Name: Elias Phelps  MRN: [id number]  Admission Date: 9/18/2018  Hospital Length of Stay: 1 days  Attending Provider: Bharathi Shearer MD  Primary Care Provider: Primary Doctor No  Follow-up For: Procedure(s) (LRB):  REVISION, OF EXTERNAL FIXATION (Right)  CLOSED REDUCTION, TIBIA (Right)  REMOVAL, FOREIGN BODY, LOWER EXTREMITY (Right)    Post-Operative Day: 1 Day Post-Op  Subjective:     Principal Problem:Open pilon fracture, left, type I or II, initial encounter    Principal Orthopedic Problem: s/p L pilon ex fix revision    Interval History: NAEON. Pain controlled this am. Aggressive ice and elevation overnight.     Review of patient's allergies indicates:  No Known Allergies    Current Facility-Administered Medications   Medication    acetaminophen tablet 1,000 mg    amLODIPine tablet 10 mg    apixaban tablet 5 mg    ceFAZolin injection 2 g    docusate sodium capsule 50 mg    HYDROmorphone injection 0.5 mg    losartan tablet 25 mg    ondansetron tablet 4 mg    oxyCODONE immediate release tablet 10 mg    oxyCODONE immediate release tablet 5 mg    vancomycin in dextrose 5 % 1 gram/250 mL IVPB 1,000 mg     Objective:     Vital Signs (Most Recent):  Temp: 98 °F (36.7 °C) (09/19/18 0716)  Pulse: 78 (09/19/18 0716)  Resp: 18 (09/19/18 0716)  BP: 118/68 (09/19/18 0716)  SpO2: 95 % (09/19/18 0716) Vital Signs (24h Range):  Temp:  [96.8 °F (36 °C)-98.3 °F (36.8 °C)] 98 °F (36.7 °C)  Pulse:  [65-91] 78  Resp:  [14-22] 18  SpO2:  [95 %-100 %] 95 %  BP: (108-137)/(63-95) 118/68     Weight: 72.6 kg (160 lb)  Height: 5' 5" (165.1 cm)  Body mass index is 26.63 kg/m².      Intake/Output Summary (Last 24 hours) at 9/19/2018 0943  Last data filed at 9/19/2018 0600  Gross per 24 hour   Intake 1232 ml   Output 500 ml   Net 732 ml   Gen: No acute distress  HEENT: normocephalic, atraumatic  CV: regular rate  Chest: symmetric, nonlabored " respirations    Ortho/SPM Exam  Ex fix in place RLE  2+ DP and PT pulses  Compartments soft  Moderate diffuse swelling  SILT throughout  Can move toes without difficulty      Significant Imaging: I have reviewed and interpreted all pertinent imaging results/findings.    Assessment/Plan:     Open displaced pilon fracture of left tibia    Elias Phelps is a 52 y.o. male s/p revision of exfix for left open pilon fx  Pain control: oxycodone PRN  PT/OT: NWB LLE  DVT PPx: retarted home eliquus 5mg BID  Abx: postop Ancef and vanc, will d/c with  Bactrim for SSTI  Nursing:: pin site care BID, aggressive ice and elevation of RLE    Dispo: f/u PT recs              Sherry Flores MD  Orthopedics  [facility]

## 2018-09-20 ENCOUNTER — TELEPHONE (OUTPATIENT)
Dept: PHARMACY | Facility: CLINIC | Age: 53
End: 2018-09-20

## 2018-09-20 LAB — VANCOMYCIN TROUGH SERPL-MCNC: 9.4 UG/ML

## 2018-09-20 PROCEDURE — 36415 COLL VENOUS BLD VENIPUNCTURE: CPT

## 2018-09-20 PROCEDURE — 63600175 PHARM REV CODE 636 W HCPCS: Performed by: STUDENT IN AN ORGANIZED HEALTH CARE EDUCATION/TRAINING PROGRAM

## 2018-09-20 PROCEDURE — 97530 THERAPEUTIC ACTIVITIES: CPT

## 2018-09-20 PROCEDURE — 99212 OFFICE O/P EST SF 10 MIN: CPT | Mod: ICN,,, | Performed by: ANESTHESIOLOGY

## 2018-09-20 PROCEDURE — 25000003 PHARM REV CODE 250: Performed by: STUDENT IN AN ORGANIZED HEALTH CARE EDUCATION/TRAINING PROGRAM

## 2018-09-20 PROCEDURE — 12000002 HC ACUTE/MED SURGE SEMI-PRIVATE ROOM

## 2018-09-20 PROCEDURE — G8988 SELF CARE GOAL STATUS: HCPCS | Mod: CI

## 2018-09-20 PROCEDURE — G0378 HOSPITAL OBSERVATION PER HR: HCPCS

## 2018-09-20 PROCEDURE — G8987 SELF CARE CURRENT STATUS: HCPCS | Mod: CJ

## 2018-09-20 PROCEDURE — 80202 ASSAY OF VANCOMYCIN: CPT

## 2018-09-20 RX ORDER — ACETAMINOPHEN 500 MG
1000 TABLET ORAL EVERY 6 HOURS
Status: DISCONTINUED | OUTPATIENT
Start: 2018-09-20 | End: 2018-09-21 | Stop reason: HOSPADM

## 2018-09-20 RX ORDER — OXYCODONE HYDROCHLORIDE 10 MG/1
20 TABLET ORAL
Status: DISCONTINUED | OUTPATIENT
Start: 2018-09-20 | End: 2018-09-21 | Stop reason: HOSPADM

## 2018-09-20 RX ADMIN — METHOCARBAMOL 750 MG: 750 TABLET ORAL at 10:09

## 2018-09-20 RX ADMIN — OXYCODONE HYDROCHLORIDE 20 MG: 10 TABLET ORAL at 07:09

## 2018-09-20 RX ADMIN — AMLODIPINE BESYLATE 10 MG: 10 TABLET ORAL at 08:09

## 2018-09-20 RX ADMIN — CEFAZOLIN 2 G: 330 INJECTION, POWDER, FOR SOLUTION INTRAMUSCULAR; INTRAVENOUS at 06:09

## 2018-09-20 RX ADMIN — LOSARTAN POTASSIUM 25 MG: 25 TABLET, FILM COATED ORAL at 08:09

## 2018-09-20 RX ADMIN — VANCOMYCIN HYDROCHLORIDE 1000 MG: 1 INJECTION, POWDER, LYOPHILIZED, FOR SOLUTION INTRAVENOUS at 11:09

## 2018-09-20 RX ADMIN — OXYCODONE HYDROCHLORIDE 20 MG: 10 TABLET ORAL at 04:09

## 2018-09-20 RX ADMIN — ACETAMINOPHEN 1000 MG: 500 TABLET ORAL at 11:09

## 2018-09-20 RX ADMIN — Medication 0.5 MG: at 05:09

## 2018-09-20 RX ADMIN — ACETAMINOPHEN 1000 MG: 500 TABLET ORAL at 05:09

## 2018-09-20 RX ADMIN — PREGABALIN 150 MG: 150 CAPSULE ORAL at 10:09

## 2018-09-20 RX ADMIN — OXYCODONE HYDROCHLORIDE 20 MG: 10 TABLET ORAL at 11:09

## 2018-09-20 RX ADMIN — CEFAZOLIN 2 G: 330 INJECTION, POWDER, FOR SOLUTION INTRAMUSCULAR; INTRAVENOUS at 02:09

## 2018-09-20 RX ADMIN — APIXABAN 5 MG: 2.5 TABLET, FILM COATED ORAL at 10:09

## 2018-09-20 RX ADMIN — Medication 0.5 MG: at 12:09

## 2018-09-20 RX ADMIN — METHOCARBAMOL 750 MG: 750 TABLET ORAL at 02:09

## 2018-09-20 RX ADMIN — APIXABAN 5 MG: 2.5 TABLET, FILM COATED ORAL at 08:09

## 2018-09-20 RX ADMIN — OXYCODONE HYDROCHLORIDE 15 MG: 10 TABLET ORAL at 02:09

## 2018-09-20 RX ADMIN — CEFAZOLIN 2 G: 330 INJECTION, POWDER, FOR SOLUTION INTRAMUSCULAR; INTRAVENOUS at 10:09

## 2018-09-20 RX ADMIN — METHOCARBAMOL 750 MG: 750 TABLET ORAL at 08:09

## 2018-09-20 RX ADMIN — OXYCODONE HYDROCHLORIDE 15 MG: 10 TABLET ORAL at 07:09

## 2018-09-20 RX ADMIN — OXYCODONE HYDROCHLORIDE 20 MG: 10 TABLET ORAL at 10:09

## 2018-09-20 NOTE — PT/OT/SLP PROGRESS
Physical Therapy Treatment    Patient Name:  Elias Phelps   MRN:  [id number]    Recommendations:     Discharge Recommendations:  home with home health   Discharge Equipment Recommendations: bedside commode, bath bench   Barriers to discharge: None    Assessment:     Elias Phelps is a 52 y.o. male admitted with a medical diagnosis of Open pilon fracture, left, type I or II, initial encounter.  He presents with the following impairments/functional limitations:  weakness, impaired endurance, impaired self care skills, gait instability, impaired functional mobilty, impaired joint extensibility, pain, impaired balance, decreased lower extremity function, decreased ROM, orthopedic precautions.    -Pt with increased pain on arrival today and stated he did not want to try and walk again and be in pain. PT educated pt on the importance of OOB activity and was agreeable for PT to provide education on proper use of wheelchair and bedside commode. PT educated pt and pt verbalized understanding. Pt remains appropriate for HHPT.    Rehab Prognosis:  Good; patient would benefit from acute skilled PT services to address these deficits and reach maximum level of function.      Recent Surgery: Procedure(s) (LRB):  REVISION, OF EXTERNAL FIXATION (Right)  CLOSED REDUCTION, TIBIA (Right)  REMOVAL, FOREIGN BODY, LOWER EXTREMITY (Right) 2 Days Post-Op    Plan:     During this hospitalization, patient to be seen daily to address the above listed problems via gait training, therapeutic activities, therapeutic exercises, neuromuscular re-education  · Plan of Care Expires:  10/19/18   Plan of Care Reviewed with: patient    Subjective     Communicated with nsg prior to session.  Patient found supine upon PT entry to room, agreeable to treatment.      Chief Complaint: impaired functional mobility  Patient comments/goals: return home to [facility]  Pain/Comfort:  · Pain Rating 1: 9/10  · Location - Side 1: Left  · Location - Orientation 1:  generalized  · Location 1: leg  · Pain Addressed 1: Pre-medicate for activity  · Pain Rating Post-Intervention 1: 9/10    Patients cultural, spiritual, Baptism conflicts given the current situation: none stated    Objective:     Patient found with: external fixator     General Precautions: Standard, fall   Orthopedic Precautions:LLE non weight bearing   Braces: N/A     Functional Mobility:  · Bed Mobility:     · Scooting: minimum assistance      AM-PAC 6 CLICK MOBILITY  Turning over in bed (including adjusting bedclothes, sheets and blankets)?: 3  Sitting down on and standing up from a chair with arms (e.g., wheelchair, bedside commode, etc.): 3  Moving from lying on back to sitting on the side of the bed?: 3  Moving to and from a bed to a chair (including a wheelchair)?: 3  Need to walk in hospital room?: 3  Climbing 3-5 steps with a railing?: 1  Basic Mobility Total Score: 16       Therapeutic Activities and Exercises:   -PT educated pt on:  A. W/c mgmt- PT showed pt how to lock/unlock w/c as well as operate arm rests, leg rests, and steering  B. DME set-up of BSC  C. Performing HEP to reduce swelling and pain    Patient left supine with all lines intact, call button in reach and nsg notified..    GOALS:   Multidisciplinary Problems     Physical Therapy Goals        Problem: Physical Therapy Goal    Goal Priority Disciplines Outcome Goal Variances Interventions   Physical Therapy Goal     PT, PT/OT Ongoing (interventions implemented as appropriate)     Description:  Goals to be met by: 18     Patient will increase functional independence with mobility by performin. Supine to sit with min (A)  2. Sit to supine with min (A)  3. Sit to stand transfer with min (A) using appropriate AD  4. Bed to chair transfer with min (A) using appropriate AD  5. Gait  x 50 feet with min (A) using appropriate AD.   6. Lower extremity exercise program x20-30 reps per handout, with assistance as needed.                         Time Tracking:     PT Received On: 09/20/18  PT Start Time: 1500     PT Stop Time: 1510  PT Total Time (min): 10 min     Billable Minutes: Therapeutic Activity 10    Treatment Type: Treatment  PT/PTA: PT           Say Belcher, PT  09/20/2018

## 2018-09-20 NOTE — PLAN OF CARE
Problem: Physical Therapy Goal  Goal: Physical Therapy Goal  Goals to be met by: 18     Patient will increase functional independence with mobility by performin. Supine to sit with min (A)  2. Sit to supine with min (A)  3. Sit to stand transfer with min (A) using appropriate AD  4. Bed to chair transfer with min (A) using appropriate AD  5. Gait  x 50 feet with min (A) using appropriate AD.   6. Lower extremity exercise program x20-30 reps per handout, with assistance as needed.       Outcome: Ongoing (interventions implemented as appropriate)  Pt with increased pain on arrival today and stated he did not want to try and walk again and be in pain. PT educated pt on the importance of OOB activity and was agreeable for PT to provide education on proper use of wheelchair and bedside commode. PT educated pt and pt verbalized understanding. Pt remains appropriate for HHPT.

## 2018-09-20 NOTE — ANESTHESIA POST-OP PAIN MANAGEMENT
Acute Pain Service Progress Note    Elias Phelps is a 52 y.o., male, [id number].    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  Originally brought to [facility].  Regained pulses and was taken to the OR for spanning external fixation with abx spacer placement and ORIF of the fibula on 8/9/18.  His medial open wound was closed at that point in time.  He was originally scheduled for f/u surgery on 8/20, but had insurance issues and was unable to do so but now s/p POD1 L pilon ex fix revision in in severe pain. Consulted by ortho for pain mgt.    Per pt, he was taking percocet 10s x2 q4h at home. Baseline pain 8 w/ occasional bumps to 10. Stated his goal was 2/10 - gave him a more realistic expectation.           Surgery: REVISION, OF EXTERNAL FIXATION (Right)  CLOSED REDUCTION, TIBIA (Right)  REMOVAL, FOREIGN BODY, LOWER EXTREMITY (Right)    Post Op Day #: 2    Problem List:    Active Hospital Problems    Diagnosis  POA    Open displaced pilon fracture of left tibia [S82.872B]  Yes      Resolved Hospital Problems    Diagnosis Date Resolved POA    *Open pilon fracture, left, type I or II, initial encounter [S82.306X] 09/19/2018 Yes       Subjective:     General appearance of visibly uncomfortable   Pain with rest: 8    Numbers   Pain with movement: 10    Numbers   Side Effects    1. Pruritis No    2. Nausea No    3. Motor Blockade No, 0=Ability to raise lower extremities off bed    4. Sedation No, 1=awake and alert      Vitals   Vitals:    09/20/18 0655   BP: 135/85   Pulse: 85   Resp: 16   Temp: 36.8 °C (98.3 °F)        Labs    No visits with results within 2 Day(s) from this visit.   Latest known visit with results is:   Lab Visit on 09/12/2018   Component Date Value Ref Range Status    WBC 09/12/2018 6.87  3.90 - 12.70 K/uL Final    RBC 09/12/2018 4.17* 4.60 - 6.20 M/uL Final    Hemoglobin 09/12/2018 12.1* 14.0 - 18.0 g/dL Final    Hematocrit 09/12/2018 39.3* 40.0 - 54.0 % Final     MCV 09/12/2018 94  82 - 98 fL Final    MCH 09/12/2018 29.0  27.0 - 31.0 pg Final    MCHC 09/12/2018 30.8* 32.0 - 36.0 g/dL Final    RDW 09/12/2018 13.6  11.5 - 14.5 % Final    Platelets 09/12/2018 306  150 - 350 K/uL Final    MPV 09/12/2018 9.1* 9.2 - 12.9 fL Final    Immature Granulocytes 09/12/2018 0.9* 0.0 - 0.5 % Final    Gran # (ANC) 09/12/2018 4.3  1.8 - 7.7 K/uL Final    Immature Grans (Abs) 09/12/2018 0.06* 0.00 - 0.04 K/uL Final    Lymph # 09/12/2018 1.8  1.0 - 4.8 K/uL Final    Mono # 09/12/2018 0.5  0.3 - 1.0 K/uL Final    Eos # 09/12/2018 0.2  0.0 - 0.5 K/uL Final    Baso # 09/12/2018 0.02  0.00 - 0.20 K/uL Final    nRBC 09/12/2018 0  0 /100 WBC Final    Gran% 09/12/2018 62.2  38.0 - 73.0 % Final    Lymph% 09/12/2018 26.2  18.0 - 48.0 % Final    Mono% 09/12/2018 7.9  4.0 - 15.0 % Final    Eosinophil% 09/12/2018 2.5  0.0 - 8.0 % Final    Basophil% 09/12/2018 0.3  0.0 - 1.9 % Final    Differential Method 09/12/2018 Automated   Final    Sodium 09/12/2018 140  136 - 145 mmol/L Final    Potassium 09/12/2018 4.4  3.5 - 5.1 mmol/L Final    Chloride 09/12/2018 107  95 - 110 mmol/L Final    CO2 09/12/2018 26  23 - 29 mmol/L Final    Glucose 09/12/2018 88  70 - 110 mg/dL Final    BUN, Bld 09/12/2018 18  6 - 20 mg/dL Final    Creatinine 09/12/2018 1.0  0.5 - 1.4 mg/dL Final    Calcium 09/12/2018 9.5  8.7 - 10.5 mg/dL Final    Total Protein 09/12/2018 7.9  6.0 - 8.4 g/dL Final    Albumin 09/12/2018 3.6  3.5 - 5.2 g/dL Final    Total Bilirubin 09/12/2018 0.4  0.1 - 1.0 mg/dL Final    Alkaline Phosphatase 09/12/2018 91  55 - 135 U/L Final    AST 09/12/2018 24  10 - 40 U/L Final    ALT 09/12/2018 29  10 - 44 U/L Final    Anion Gap 09/12/2018 7* 8 - 16 mmol/L Final    eGFR if African American 09/12/2018 >60.0  >60 mL/min/1.73 m^2 Final    eGFR if non African American 09/12/2018 >60.0  >60 mL/min/1.73 m^2 Final    Sed Rate 09/12/2018 25* 0 - 23 mm/Hr Final    CRP 09/12/2018 6.7  0.0  - 8.2 mg/L Final    Prealbumin 09/12/2018 31  20 - 43 mg/dL Final        Meds   Current Facility-Administered Medications   Medication Dose Route Frequency Provider Last Rate Last Dose    acetaminophen tablet 1,000 mg  1,000 mg Oral Q6H Antoinette Chand MD        amLODIPine tablet 10 mg  10 mg Oral Daily Sherry Flores MD   10 mg at 09/20/18 0815    apixaban tablet 5 mg  5 mg Oral BID Sherry Flores MD   5 mg at 09/20/18 0813    ceFAZolin injection 2 g  2 g Intravenous Q8H Sherry Flores MD   2 g at 09/20/18 0625    docusate sodium capsule 50 mg  50 mg Oral Daily Sherry Flores MD   50 mg at 09/19/18 0908    losartan tablet 25 mg  25 mg Oral Daily Sherry Flores MD   25 mg at 09/20/18 0815    methocarbamol tablet 750 mg  750 mg Oral TID Sherry Flores MD   750 mg at 09/20/18 0814    ondansetron tablet 4 mg  4 mg Oral Q6H PRN Sherry Flores MD        oxyCODONE immediate release tablet 15 mg  15 mg Oral Q3H PRN Antoinette Chand MD        oxyCODONE immediate release tablet Tab 20 mg  20 mg Oral Q3H PRN Antoinette Chand MD        pregabalin capsule 150 mg  150 mg Oral QHS Tee Michael MD   150 mg at 09/19/18 2057    vancomycin in dextrose 5 % 1 gram/250 mL IVPB 1,000 mg  1,000 mg Intravenous Q12H Sherry Flores MD   1,000 mg at 09/19/18 2425          Assessment:     Pain control inadequate    Plan:     Modify present therapy to improve control of pain .   Based on his opiate requirements over the last 24h, he would likely require 150mg Oral Morphine Equivalents daily.      - Recommend: tylenol 650mg q4h, oxy IR 20mg q3h PRN, robaxin 750mg TID and lyrica 150mg QHS      Will cont to f/u.    Antoinette Chand, PGY4  [facility]         Pt seen and examined. Agree with above resident assessment and plan.   Given 24 hour opioid use, pt would need Oxycodone 20 mg q 3 hours prn in addition to multimodal medications.  Will continue to follow to determine effectiveness.

## 2018-09-20 NOTE — ASSESSMENT & PLAN NOTE
Elias Phelps is a 52 y.o. male s/p revision of exfix for left open pilon fx  Pain control: APS consulted for difficulty with pain control, now on multimodal regimen, of note pt was taking double his prescribed dose of percocet (20mg) at home every 3 hours   PT/OT: NWB LLE  DVT PPx: retarted home eliquus 5mg BID  Abx: Ancef and vanc, will d/c with  Bactrim for SSTI  Nursing:: pin site care BID, aggressive ice and elevation of RLE    Dispo: d.c home today

## 2018-09-20 NOTE — PROGRESS NOTES
"[facility]  Orthopedics  Progress Note    Patient Name: Elias Phelps  MRN: [id number]  Admission Date: 9/18/2018  Hospital Length of Stay: 2 days  Attending Provider: Bharathi Shearer MD  Primary Care Provider: Primary Doctor No  Follow-up For: Procedure(s) (LRB):  REVISION, OF EXTERNAL FIXATION (Right)  CLOSED REDUCTION, TIBIA (Right)  REMOVAL, FOREIGN BODY, LOWER EXTREMITY (Right)    Post-Operative Day: 2 Days Post-Op  Subjective:     Principal Problem:Open pilon fracture, left, type I or II, initial encounter    Principal Orthopedic Problem: s/p L pilon ex fix revision    Interval History: NAEON. Pt with severe pain. Aggressive ice and elevation overnight. Denies numbness/tingling to LLE. Denies muscle spasms.     Review of patient's allergies indicates:  No Known Allergies    Current Facility-Administered Medications   Medication    acetaminophen tablet 1,000 mg    amLODIPine tablet 10 mg    apixaban tablet 5 mg    ceFAZolin injection 2 g    docusate sodium capsule 50 mg    HYDROmorphone injection 0.5 mg    losartan tablet 25 mg    methocarbamol tablet 750 mg    ondansetron tablet 4 mg    oxyCODONE immediate release tablet 15 mg    oxyCODONE immediate release tablet Tab 20 mg    pregabalin capsule 150 mg    vancomycin in dextrose 5 % 1 gram/250 mL IVPB 1,000 mg     Objective:     Vital Signs (Most Recent):  Temp: 98.3 °F (36.8 °C) (09/20/18 0655)  Pulse: 85 (09/20/18 0655)  Resp: 16 (09/20/18 0655)  BP: 135/85 (09/20/18 0655)  SpO2: 96 % (09/20/18 0655) Vital Signs (24h Range):  Temp:  [96.9 °F (36.1 °C)-98.7 °F (37.1 °C)] 98.3 °F (36.8 °C)  Pulse:  [77-89] 85  Resp:  [16-20] 16  SpO2:  [95 %-99 %] 96 %  BP: (119-140)/(66-91) 135/85     Weight: 72.6 kg (160 lb)  Height: 5' 5" (165.1 cm)  Body mass index is 26.63 kg/m².      Intake/Output Summary (Last 24 hours) at 9/20/2018 0813  Last data filed at 9/19/2018 1755  Gross per 24 hour   Intake 1146 ml   Output 900 ml   Net 246 ml "   Gen: No acute distress  HEENT: normocephalic, atraumatic  CV: regular rate  Chest: symmetric, nonlabored respirations    Ortho/SPM Exam    Ex fix in place RLE  2+ DP and PT pulses  Compartments soft  Moderate diffuse swelling  SILT throughout  Can move toes without difficulty      Significant Imaging: I have reviewed and interpreted all pertinent imaging results/findings.    Assessment/Plan:     Open displaced pilon fracture of left tibia    Elias Phelps is a 52 y.o. male s/p revision of exfix for left open pilon fx  Pain control: APS consulted for difficulty with pain control, now on multimodal regimen, of note pt was taking double his prescribed dose of percocet (20mg) at home every 3 hours   PT/OT: NWB LLE  DVT PPx: retarted home eliquus 5mg BID  Abx: Ancef and vanc, will d/c with  Bactrim for SSTI  Nursing:: pin site care BID, aggressive ice and elevation of RLE    Dispo: d.c home today              Sherry Flores MD  Orthopedics  [facility]    Att Note:  I agree with the above assessment and plan.    Bharathi Shearer MD

## 2018-09-20 NOTE — SUBJECTIVE & OBJECTIVE
"Principal Problem:Open pilon fracture, left, type I or II, initial encounter    Principal Orthopedic Problem: s/p L pilon ex fix revision    Interval History: NAEON. Pt with severe pain. Aggressive ice and elevation overnight. Denies numbness/tingling to LLE. Denies muscle spasms.     Review of patient's allergies indicates:  No Known Allergies    Current Facility-Administered Medications   Medication    acetaminophen tablet 1,000 mg    amLODIPine tablet 10 mg    apixaban tablet 5 mg    ceFAZolin injection 2 g    docusate sodium capsule 50 mg    HYDROmorphone injection 0.5 mg    losartan tablet 25 mg    methocarbamol tablet 750 mg    ondansetron tablet 4 mg    oxyCODONE immediate release tablet 15 mg    oxyCODONE immediate release tablet Tab 20 mg    pregabalin capsule 150 mg    vancomycin in dextrose 5 % 1 gram/250 mL IVPB 1,000 mg     Objective:     Vital Signs (Most Recent):  Temp: 98.3 °F (36.8 °C) (09/20/18 0655)  Pulse: 85 (09/20/18 0655)  Resp: 16 (09/20/18 0655)  BP: 135/85 (09/20/18 0655)  SpO2: 96 % (09/20/18 0655) Vital Signs (24h Range):  Temp:  [96.9 °F (36.1 °C)-98.7 °F (37.1 °C)] 98.3 °F (36.8 °C)  Pulse:  [77-89] 85  Resp:  [16-20] 16  SpO2:  [95 %-99 %] 96 %  BP: (119-140)/(66-91) 135/85     Weight: 72.6 kg (160 lb)  Height: 5' 5" (165.1 cm)  Body mass index is 26.63 kg/m².      Intake/Output Summary (Last 24 hours) at 9/20/2018 0813  Last data filed at 9/19/2018 1755  Gross per 24 hour   Intake 1146 ml   Output 900 ml   Net 246 ml   Gen: No acute distress  HEENT: normocephalic, atraumatic  CV: regular rate  Chest: symmetric, nonlabored respirations    Ortho/SPM Exam    Ex fix in place RLE  2+ DP and PT pulses  Compartments soft  Moderate diffuse swelling  SILT throughout  Can move toes without difficulty      Significant Imaging: I have reviewed and interpreted all pertinent imaging results/findings.  "

## 2018-09-20 NOTE — ANESTHESIA POST-OP PAIN MANAGEMENT
Acute Pain Service Progress Note    Elias Phelps is a 52 y.o., male, [id number].    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  Originally brought to [facility].  Regained pulses and was taken to the OR for spanning external fixation with abx spacer placement and ORIF of the fibula on 8/9/18.  His medial open wound was closed at that point in time.  He was originally scheduled for f/u surgery on 8/20, but had insurance issues and was unable to do so but now s/p POD1 L pilon ex fix revision in in severe pain. Consulted by ortho for pain mgt.    Per pt, he was taking percocet 10s x2 q4h at home. Baseline pain 8 w/ occasional bumps to 10. Stated his goal was 2/10 - gave him a more realistic expectation.     Current regimen: oxy IR 10 q4 prn for 7-10 pain; 0.5 dilaudid IV q4 prn; robaxin 750 TID prn; tylenol 1000mg q6 scheduled      Surgery: REVISION, OF EXTERNAL FIXATION (Right)  CLOSED REDUCTION, TIBIA (Right)  REMOVAL, FOREIGN BODY, LOWER EXTREMITY (Right)    Post Op Day #: 1    Problem List:    Active Hospital Problems    Diagnosis  POA    Open displaced pilon fracture of left tibia [S82.652B]  Yes      Resolved Hospital Problems    Diagnosis Date Resolved POA    *Open pilon fracture, left, type I or II, initial encounter [S82.995V] 09/19/2018 Yes       Subjective:     General appearance of visibly uncomfortable   Pain with rest: 10    Numbers   Pain with movement: 10    Numbers   Side Effects    1. Pruritis No    2. Nausea No    3. Motor Blockade No, 0=Ability to raise lower extremities off bed    4. Sedation No, 1=awake and alert      Vitals   Vitals:    09/19/18 2021   BP: 128/82   Pulse: 89   Resp: 20   Temp:         Labs    No visits with results within 2 Day(s) from this visit.   Latest known visit with results is:   Lab Visit on 09/12/2018   Component Date Value Ref Range Status    WBC 09/12/2018 6.87  3.90 - 12.70 K/uL Final    RBC 09/12/2018 4.17* 4.60 - 6.20 M/uL Final     Hemoglobin 09/12/2018 12.1* 14.0 - 18.0 g/dL Final    Hematocrit 09/12/2018 39.3* 40.0 - 54.0 % Final    MCV 09/12/2018 94  82 - 98 fL Final    MCH 09/12/2018 29.0  27.0 - 31.0 pg Final    MCHC 09/12/2018 30.8* 32.0 - 36.0 g/dL Final    RDW 09/12/2018 13.6  11.5 - 14.5 % Final    Platelets 09/12/2018 306  150 - 350 K/uL Final    MPV 09/12/2018 9.1* 9.2 - 12.9 fL Final    Immature Granulocytes 09/12/2018 0.9* 0.0 - 0.5 % Final    Gran # (ANC) 09/12/2018 4.3  1.8 - 7.7 K/uL Final    Immature Grans (Abs) 09/12/2018 0.06* 0.00 - 0.04 K/uL Final    Lymph # 09/12/2018 1.8  1.0 - 4.8 K/uL Final    Mono # 09/12/2018 0.5  0.3 - 1.0 K/uL Final    Eos # 09/12/2018 0.2  0.0 - 0.5 K/uL Final    Baso # 09/12/2018 0.02  0.00 - 0.20 K/uL Final    nRBC 09/12/2018 0  0 /100 WBC Final    Gran% 09/12/2018 62.2  38.0 - 73.0 % Final    Lymph% 09/12/2018 26.2  18.0 - 48.0 % Final    Mono% 09/12/2018 7.9  4.0 - 15.0 % Final    Eosinophil% 09/12/2018 2.5  0.0 - 8.0 % Final    Basophil% 09/12/2018 0.3  0.0 - 1.9 % Final    Differential Method 09/12/2018 Automated   Final    Sodium 09/12/2018 140  136 - 145 mmol/L Final    Potassium 09/12/2018 4.4  3.5 - 5.1 mmol/L Final    Chloride 09/12/2018 107  95 - 110 mmol/L Final    CO2 09/12/2018 26  23 - 29 mmol/L Final    Glucose 09/12/2018 88  70 - 110 mg/dL Final    BUN, Bld 09/12/2018 18  6 - 20 mg/dL Final    Creatinine 09/12/2018 1.0  0.5 - 1.4 mg/dL Final    Calcium 09/12/2018 9.5  8.7 - 10.5 mg/dL Final    Total Protein 09/12/2018 7.9  6.0 - 8.4 g/dL Final    Albumin 09/12/2018 3.6  3.5 - 5.2 g/dL Final    Total Bilirubin 09/12/2018 0.4  0.1 - 1.0 mg/dL Final    Alkaline Phosphatase 09/12/2018 91  55 - 135 U/L Final    AST 09/12/2018 24  10 - 40 U/L Final    ALT 09/12/2018 29  10 - 44 U/L Final    Anion Gap 09/12/2018 7* 8 - 16 mmol/L Final    eGFR if African American 09/12/2018 >60.0  >60 mL/min/1.73 m^2 Final    eGFR if non African American 09/12/2018  >60.0  >60 mL/min/1.73 m^2 Final    Sed Rate 09/12/2018 25* 0 - 23 mm/Hr Final    CRP 09/12/2018 6.7  0.0 - 8.2 mg/L Final    Prealbumin 09/12/2018 31  20 - 43 mg/dL Final        Meds   Current Facility-Administered Medications   Medication Dose Route Frequency Provider Last Rate Last Dose    acetaminophen tablet 1,000 mg  1,000 mg Oral Q6H PRN Bharathi Shearer MD   1,000 mg at 09/19/18 2006    amLODIPine tablet 10 mg  10 mg Oral Daily Sherry Flores MD   10 mg at 09/19/18 0808    apixaban tablet 5 mg  5 mg Oral BID Sherry Flores MD   5 mg at 09/19/18 2021    ceFAZolin injection 2 g  2 g Intravenous Q8H Sherry Flores MD   2 g at 09/19/18 1420    docusate sodium capsule 50 mg  50 mg Oral Daily Sherry Flores MD   50 mg at 09/19/18 0908    HYDROmorphone injection 0.5 mg  0.5 mg Intravenous Q4H PRN Bharathi Shearer MD   0.5 mg at 09/19/18 1857    losartan tablet 25 mg  25 mg Oral Daily Sherry Flores MD   25 mg at 09/19/18 0808    methocarbamol tablet 750 mg  750 mg Oral TID Sherry Flores MD   750 mg at 09/19/18 2022    ondansetron tablet 4 mg  4 mg Oral Q6H PRN Sherry Flores MD        oxyCODONE immediate release tablet 10 mg  10 mg Oral Q4H PRN Sherry Flores MD   10 mg at 09/19/18 1952    oxyCODONE immediate release tablet 5 mg  5 mg Oral Q4H PRN Sherry Flores MD        vancomycin in dextrose 5 % 1 gram/250 mL IVPB 1,000 mg  1,000 mg Intravenous Q12H Sherry Flores MD   1,000 mg at 09/19/18 1140          Assessment:     Pain control inadequate    Plan:     Modify present therapy to improve control of pain .   Ortho would like to DC pt tomorrow if possible, will attempt to limit IV narcotics, though he may require tehm to get the initial pain under control. Set a reasonable expectation.    - Cont tylenol 100mg q6 scheduled  - cont robaxin 750mg TID  - started pt on oxy IR 20mg q4 prn for 7-10 pain, 15mg q4 prn for 4-6 pain  - changed dilaudid to 0.5mg q2 prn for breakthrough  - admin a 1x dose of  1.5mg dilaudid for immediate pain control  - started lyrica 150mg qhs    Will cont to f/u.    Tee Michael, CA-3  APS    Evaluator Tee Michael

## 2018-09-20 NOTE — PLAN OF CARE
Problem: Occupational Therapy Goal  Goal: Occupational Therapy Goal  Goals to be met by: 10/2/2018     Patient will increase functional independence with ADLs by performing:    UE Dressing with Love. -- Met  LE Dressing with Minimal Assistance.  Grooming while standing at sink with Minimal Assistance.  Toileting from bedside commode with Minimal Assistance for hygiene and clothing management.   Toilet transfer to bedside commode with Stand-by Assistance.     Outcome: Ongoing (interventions implemented as appropriate)  Pt continues to work towards his functional outcomes     Comments: Cont OT POC

## 2018-09-20 NOTE — PT/OT/SLP PROGRESS
"Occupational Therapy   Treatment    Name: Elias Phelps  MRN: [id number]  Admitting Diagnosis:  Open pilon fracture, left, type I or II, initial encounter  2 Days Post-Op    Recommendations:     Discharge Recommendations: home with home health  Discharge Equipment Recommendations:  bedside commode, bath bench  Barriers to discharge:  None    Subjective     Communicated with: RN prior to session.  Pain/Comfort:  · Pain Rating 1: 10/10  · Location - Side 1: Left  · Location - Orientation 1: generalized  · Location 1: leg  · Pain Addressed 1: Pre-medicate for activity, Reposition, Distraction, Cessation of Activity  · Pain Rating Post-Intervention 1: 10/10    Patients cultural, spiritual, Baptism conflicts given the current situation: none stated     Objective:     Patient found with: external fixator    General Precautions: Standard, fall   Orthopedic Precautions:LLE non weight bearing   Braces: N/A     Occupational Performance:    Bed Mobility:    · Patient completed Scooting/Bridging with stand by assistance  · Patient completed Supine to Sit with minimum assistance and with leg lift  · Patient completed Sit to Supine with minimum assistance and with leg lift     Functional Mobility/Transfers:  · Patient completed Sit <> Stand Transfer with contact guard assistance  with  rolling walker   · Functional Mobility: Pt ambulated 34ft x2 w/ CGA for safety and RW. Pt demonstrates good understanding of RW management and gait sequence while maintaining LLE NWB precautions.    Activities of Daily Living:  · Upper Body Dressing: independence donning back gown sitting EOB   · Pt in "too much pain" to perform further ADLs     Patient left w/ LLE elevated and iced  with call button in reach and RN notified    [facility] 6 Click: Self-care  [facility] Total Score: 21    Treatment & Education:  - OT POC  - Importance of OOB activity to maximize recovery and prevent deconditioning   - Reviewed LLE NWB  - Verbal cues for upright standing posture " with mobility   - Deep breathing   Education:    Assessment:     Elias Phelps is a 52 y.o. male with a medical diagnosis of Open pilon fracture, left, type I or II, initial encounter.  He presents with the following performance deficits affecting function:  weakness, impaired endurance, impaired self care skills, impaired balance, impaired functional mobilty, decreased lower extremity function, gait instability, pain, orthopedic precautions, impaired skin.      Pt tolerated fairly. Pt required moderate encouragement from therapist and RN to participate in session. Treatment sessions continue to be limited in activity performance 2/2 LLE pain. Pt continues to have good mobility requiring CGA w/ RW but maintains to demonstrate difficulty w/ bed mobility and managing LLE independently. At this time pt's functional performance/participation is impacted by pain, decreased activity tolerance and endurance. Pt continues to benefit from skilled OT to address the above listed impairments.     Rehab Prognosis:  Good; patient would benefit from acute skilled OT services to address these deficits and reach maximum level of function.       Plan:     Patient to be seen daily to address the above listed problems via self-care/home management, therapeutic activities, therapeutic exercises  · Plan of Care Expires: 10/19/18  · Plan of Care Reviewed with: patient    This Plan of care has been discussed with the patient who was involved in its development and understands and is in agreement with the identified goals and treatment plan    GOALS:   Multidisciplinary Problems     Occupational Therapy Goals        Problem: Occupational Therapy Goal    Goal Priority Disciplines Outcome Interventions   Occupational Therapy Goal     OT, PT/OT Ongoing (interventions implemented as appropriate)    Description:  Goals to be met by: 10/2/2018     Patient will increase functional independence with ADLs by performing:    UE Dressing with  [place]. -- Met  LE Dressing with Minimal Assistance.  Grooming while standing at sink with Minimal Assistance.  Toileting from bedside commode with Minimal Assistance for hygiene and clothing management.   Toilet transfer to bedside commode with Stand-by Assistance.                       Time Tracking:     OT Date of Treatment: 09/20/18  OT Start Time: 1319  OT Stop Time: 1342  OT Total Time (min): 23 min    Billable Minutes:Therapeutic Activity 23    Klarissa Hidalgo OT  9/20/2018

## 2018-09-21 ENCOUNTER — TELEPHONE (OUTPATIENT)
Dept: PHARMACY | Facility: CLINIC | Age: 53
End: 2018-09-21

## 2018-09-21 VITALS
HEART RATE: 63 BPM | HEIGHT: 65 IN | DIASTOLIC BLOOD PRESSURE: 90 MMHG | OXYGEN SATURATION: 99 % | WEIGHT: 160 LBS | TEMPERATURE: 96 F | BODY MASS INDEX: 26.66 KG/M2 | SYSTOLIC BLOOD PRESSURE: 133 MMHG | RESPIRATION RATE: 16 BRPM

## 2018-09-21 LAB
CREAT SERPL-MCNC: 1 MG/DL
EST. GFR  (AFRICAN AMERICAN): >60 ML/MIN/1.73 M^2
EST. GFR  (NON AFRICAN AMERICAN): >60 ML/MIN/1.73 M^2

## 2018-09-21 PROCEDURE — 36415 COLL VENOUS BLD VENIPUNCTURE: CPT

## 2018-09-21 PROCEDURE — G0378 HOSPITAL OBSERVATION PER HR: HCPCS

## 2018-09-21 PROCEDURE — 63600175 PHARM REV CODE 636 W HCPCS: Performed by: STUDENT IN AN ORGANIZED HEALTH CARE EDUCATION/TRAINING PROGRAM

## 2018-09-21 PROCEDURE — 25000003 PHARM REV CODE 250: Performed by: STUDENT IN AN ORGANIZED HEALTH CARE EDUCATION/TRAINING PROGRAM

## 2018-09-21 PROCEDURE — 82565 ASSAY OF CREATININE: CPT

## 2018-09-21 RX ADMIN — OXYCODONE HYDROCHLORIDE 20 MG: 10 TABLET ORAL at 06:09

## 2018-09-21 RX ADMIN — ACETAMINOPHEN 1000 MG: 500 TABLET ORAL at 06:09

## 2018-09-21 RX ADMIN — OXYCODONE HYDROCHLORIDE 20 MG: 10 TABLET ORAL at 11:09

## 2018-09-21 RX ADMIN — APIXABAN 5 MG: 2.5 TABLET, FILM COATED ORAL at 08:09

## 2018-09-21 RX ADMIN — METHOCARBAMOL 750 MG: 750 TABLET ORAL at 08:09

## 2018-09-21 RX ADMIN — LOSARTAN POTASSIUM 25 MG: 25 TABLET, FILM COATED ORAL at 08:09

## 2018-09-21 RX ADMIN — CEFAZOLIN 2 G: 330 INJECTION, POWDER, FOR SOLUTION INTRAMUSCULAR; INTRAVENOUS at 06:09

## 2018-09-21 RX ADMIN — AMLODIPINE BESYLATE 10 MG: 10 TABLET ORAL at 08:09

## 2018-09-21 NOTE — PLAN OF CARE
POD 3 s/p RLE rev ex fix, rem of abx spacer, non exc I&D. PT/OT recommended HH and DME. DME delivered to patient's bedside. Patient set up with [facility] . Patient verbalized understanding. All questions and concerns addressed. SW and CM will continue to follow for any additional needs. Discharge and follow-up instructions to be completed by the bedside nurse.  Future Appointments   Date Time Provider Department Center   9/27/2018 11:20 AM [facility], DEXA1 [facility] BMD [facility]   9/27/2018 11:40 AM LAB, APPOINTMENT [facility] LAB VNP [facility]   9/27/2018  1:30 PM [facility] FRACTURE CARE CLINIC [facility] FRA CAR [facility]      09/21/18 1100   Final Note   Assessment Type Final Discharge Note   Discharge Disposition Home-Health  ([facility] )   Hospital Follow Up  Appt(s) scheduled? Yes   Discharge plans and expectations educations in teach back method with documentation complete? Yes

## 2018-09-21 NOTE — TELEPHONE ENCOUNTER
DOCUMENTATION ONLY:  Prior Authorization for Forteo approved from 09/21/2018 to 09/21/2020    Case Id: [id number]    Co-pay: $8.35    Patient Assistance IS NOT required. Forwarded to the clinical pharmacist for consult and shipment.  SIRNIVASAN

## 2018-09-21 NOTE — PLAN OF CARE
Patient is set up with [facility] and has all DME.     Marianna Singh RN, CM [facility]  [phone]

## 2018-09-21 NOTE — ASSESSMENT & PLAN NOTE
Elias Phelps is a 52 y.o. male s/p revision of exfix for left open pilon fx  Pain control: improving, APS assisting   PT/OT: NWB LLE  DVT PPx: retarted home eliquus 5mg BID  Abx: Ancef and vanc, will d/c with  Bactrim for SSTI  Nursing:: pin site care BID, aggressive ice and elevation of RLE    Dispo: d.c home today

## 2018-09-21 NOTE — PLAN OF CARE
Problem: Patient Care Overview  Goal: Plan of Care Review  Outcome: Ongoing (interventions implemented as appropriate)  Pt AAOx4, VSS, No falls noted, fall precautions remain. Pain assessed,  pain controlled with PRN regimen,pin sites intact, no s/s of infection noted. pt comfortable, frequent rounds made for safety and patient care. SCD's in place. Call light within reach, bed wheels locked, bed in lowest position, side rails ^x2, safety maintained. NADN, Will continue monitor.

## 2018-09-21 NOTE — SUBJECTIVE & OBJECTIVE
"Principal Problem:Open pilon fracture, left, type I or II, initial encounter    Principal Orthopedic Problem: s/p L pilon ex fix revision    Interval History: NAEON. Pain improving.  Denies numbness/tingling to LLE. Denies muscle spasms. IV meds d/c.    Review of patient's allergies indicates:  No Known Allergies    Current Facility-Administered Medications   Medication    acetaminophen tablet 1,000 mg    amLODIPine tablet 10 mg    apixaban tablet 5 mg    ceFAZolin injection 2 g    docusate sodium capsule 50 mg    losartan tablet 25 mg    methocarbamol tablet 750 mg    ondansetron tablet 4 mg    oxyCODONE immediate release tablet 15 mg    oxyCODONE immediate release tablet Tab 20 mg    pregabalin capsule 150 mg    vancomycin in dextrose 5 % 1 gram/250 mL IVPB 1,000 mg     Objective:     Vital Signs (Most Recent):  Temp: 97 °F (36.1 °C) (09/21/18 0524)  Pulse: 75 (09/21/18 0524)  Resp: 18 (09/21/18 0524)  BP: 119/84 (09/21/18 0524)  SpO2: 99 % (09/21/18 0524) Vital Signs (24h Range):  Temp:  [96.5 °F (35.8 °C)-98.3 °F (36.8 °C)] 97 °F (36.1 °C)  Pulse:  [72-85] 75  Resp:  [16-20] 18  SpO2:  [93 %-99 %] 99 %  BP: (119-135)/(75-85) 119/84     Weight: 72.6 kg (160 lb)  Height: 5' 5" (165.1 cm)  Body mass index is 26.63 kg/m².      Intake/Output Summary (Last 24 hours) at 9/21/2018 0543  Last data filed at 9/20/2018 2201  Gross per 24 hour   Intake 1372 ml   Output 1225 ml   Net 147 ml   Gen: No acute distress  HEENT: normocephalic, atraumatic  CV: regular rate  Chest: symmetric, nonlabored respirations    Ortho/SPM Exam    Ex fix in place RLE  2+ DP and PT pulses  Compartments soft  Moderate diffuse swelling  SILT throughout  Can move toes without difficulty      Significant Imaging: I have reviewed and interpreted all pertinent imaging results/findings.  "

## 2018-09-21 NOTE — PROGRESS NOTES
"[facility]  Orthopedics  Progress Note    Patient Name: Elias Phelps  MRN: [id number]  Admission Date: 9/18/2018  Hospital Length of Stay: 3 days  Attending Provider: Bharathi Shearer MD  Primary Care Provider: Primary Doctor No  Follow-up For: Procedure(s) (LRB):  REVISION, OF EXTERNAL FIXATION (Right)  CLOSED REDUCTION, TIBIA (Right)  REMOVAL, FOREIGN BODY, LOWER EXTREMITY (Right)    Post-Operative Day: 3 Days Post-Op  Subjective:     Principal Problem:Open pilon fracture, left, type I or II, initial encounter    Principal Orthopedic Problem: s/p L pilon ex fix revision    Interval History: NAEON. Pain improving.  Denies numbness/tingling to LLE. Denies muscle spasms. IV meds d/c.    Review of patient's allergies indicates:  No Known Allergies    Current Facility-Administered Medications   Medication    acetaminophen tablet 1,000 mg    amLODIPine tablet 10 mg    apixaban tablet 5 mg    ceFAZolin injection 2 g    docusate sodium capsule 50 mg    losartan tablet 25 mg    methocarbamol tablet 750 mg    ondansetron tablet 4 mg    oxyCODONE immediate release tablet 15 mg    oxyCODONE immediate release tablet Tab 20 mg    pregabalin capsule 150 mg    vancomycin in dextrose 5 % 1 gram/250 mL IVPB 1,000 mg     Objective:     Vital Signs (Most Recent):  Temp: 97 °F (36.1 °C) (09/21/18 0524)  Pulse: 75 (09/21/18 0524)  Resp: 18 (09/21/18 0524)  BP: 119/84 (09/21/18 0524)  SpO2: 99 % (09/21/18 0524) Vital Signs (24h Range):  Temp:  [96.5 °F (35.8 °C)-98.3 °F (36.8 °C)] 97 °F (36.1 °C)  Pulse:  [72-85] 75  Resp:  [16-20] 18  SpO2:  [93 %-99 %] 99 %  BP: (119-135)/(75-85) 119/84     Weight: 72.6 kg (160 lb)  Height: 5' 5" (165.1 cm)  Body mass index is 26.63 kg/m².      Intake/Output Summary (Last 24 hours) at 9/21/2018 0543  Last data filed at 9/20/2018 2201  Gross per 24 hour   Intake 1372 ml   Output 1225 ml   Net 147 ml   Gen: No acute distress  HEENT: normocephalic, atraumatic  CV: regular " rate  Chest: symmetric, nonlabored respirations    Ortho/SPM Exam    Ex fix in place RLE  2+ DP and PT pulses  Compartments soft  Moderate diffuse swelling  SILT throughout  Can move toes without difficulty      Significant Imaging: I have reviewed and interpreted all pertinent imaging results/findings.    Assessment/Plan:     Open displaced pilon fracture of left tibia    Elias Phelps is a 52 y.o. male s/p revision of exfix for left open pilon fx  Pain control: improving, APS assisting   PT/OT: NWB LLE  DVT PPx: retarted home eliquus 5mg BID  Abx: Ancef and vanc, will d/c with  Bactrim for SSTI  Nursing:: pin site care BID, aggressive ice and elevation of RLE    Dispo: d.c home today              Sherry Flores MD  Orthopedics  [facility]

## 2018-09-21 NOTE — TELEPHONE ENCOUNTER
Patient is very confused as to why he is taking injections. He said no one has ever talked to him about taking injections for his bones. He would like someone from the MD's office to contact him. Sent NIKHIL molina.

## 2018-09-24 NOTE — HPI
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  Originally brought to [facility].  Regained pulses and was taken to the OR for spanning external fixation with abx spacer placement and ORIF of the fibula on 8/9/18.  His medial open wound was closed at that point in time.  He was originally scheduled for f/u surgery on 8/20, but had insurance issues and was unable to do so.  He is in my clinic today to discuss options moving forward for definitive treatment.  His pain is 5/10 at its worst, controlled by PO pain medication.  Pt presented for scheduled revision of left ex fix with removal of antibiotic space.

## 2018-09-24 NOTE — HOSPITAL COURSE
Patient presented for above procedure.  Tolerated it well and was discharged home POD2 after voiding, tolerating diet, ambulating, pain controlled.  Patient was informed about signs and symptoms of compartment syndrome and if any of these should present then he should immediately call us back and come to the ED.  Discharge instructions, follow-up appointment, and med rec are below.

## 2018-09-24 NOTE — DISCHARGE SUMMARY
[facility]  Orthopedics  Discharge Summary      Patient Name: Elias Phelps  MRN: [id number]  Admission Date: 9/18/2018  Hospital Length of Stay: 3 days  Discharge Date and Time: 9/21/2018 12:02 PM  Attending Physician: Jana att. 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  Originally brought to [facility].  Regained pulses and was taken to the OR for spanning external fixation with abx spacer placement and ORIF of the fibula on 8/9/18.  His medial open wound was closed at that point in time.  He was originally scheduled for f/u surgery on 8/20, but had insurance issues and was unable to do so.  He is in my clinic today to discuss options moving forward for definitive treatment.  His pain is 5/10 at its worst, controlled by PO pain medication.   Pt presented for scheduled revision of left ex fix with removal of antibiotic space.         Procedure(s) (LRB):  REVISION, OF EXTERNAL FIXATION (Right)  CLOSED REDUCTION, TIBIA (Right)  REMOVAL, FOREIGN BODY, LOWER EXTREMITY (Right)      Hospital Course:  Patient presented for above procedure.  Tolerated it well and was discharged home POD2 after voiding, tolerating diet, ambulating, pain controlled.  Patient was informed about signs and symptoms of compartment syndrome and if any of these should present then he should immediately call us back and come to the ED.  Discharge instructions, follow-up appointment, and med rec are below.          Significant Diagnostic Studies: No pertinent studies.    Pending Diagnostic Studies:     None        Final Active Diagnoses:    Diagnosis Date Noted POA    Open displaced pilon fracture of left tibia [S82.872B] 09/18/2018 Yes      Problems Resolved During this Admission:    Diagnosis Date Noted Date Resolved POA    PRINCIPAL PROBLEM:  Open pilon fracture, left, type I or II, initial  "encounter [S82.872B] 09/07/2018 09/19/2018 Yes      Discharged Condition: stable    Disposition: Admitted as an Inpatient    Follow Up:  Follow-up Information     [facility] [place] [facility]    Specialties:  DME Provider, Home Health Services  Why:  Home Health Services  Contact information:  [address] [place] [address] [address]  [place] [address]  [phone]             Follow up In 1 week.               Patient Instructions:      WHEELCHAIR FOR HOME USE     Order Specific Question Answer Comments   Hours in W/C per day: 24    Type of Wheelchair: Standard    Size(Width): 18"(STD adult)    Leg Support: Elevating leg rests    Arm Height: Detachable    Lap Belt: Velcro    Cushion: Basic    Height: 5' 5" (1.651 m)    Weight: 72.6 kg (160 lb)    Does patient have medical equipment at home? walker, rolling    Length of need (1-99 months): 99    Please check all that apply: Caregiver is capable and willing to operate wheelchair safely.    Please check all that apply: Patient's upper body strength is sufficient for propulsion.    Please check all that apply: The patient has a cast, brace or muscloskeletal condition which prevents 90 degree flexion of the knee.    Please check all that apply: The patient has significant edema of the lower extremities that requires an elevating leg rest.    Please check all that apply: The patient requires the use of a w/c for activities of daily living within the Home.    Please check all that apply: Patient mobility limitations cannot be sufficiently resolved by the use of other ambulatory therapies.    Vendor: [facility]    Expected Date of Delivery: 9/19/2018      COMMODE FOR HOME USE     Order Specific Question Answer Comments   Type: Standard    Height: 5' 5" (1.651 m)    Weight: 72.6 kg (160 lb)    Does patient have medical equipment at home? walker, rolling    Length of need (1-99 months): 99    Vendor: [facility]    Expected Date of Delivery: 9/19/2018      Medications:  Reconciled " Home Medications:      Medication List      START taking these medications    docusate sodium 100 MG capsule  Commonly known as:  COLACE  Take 1 capsule (100 mg total) by mouth 2 (two) times daily as needed for Constipation.     methocarbamol 750 MG Tab  Commonly known as:  ROBAXIN  Take 1 tablet (750 mg total) by mouth 3 (three) times daily. for 10 days     ondansetron 8 MG Tbdl  Commonly known as:  ZOFRAN-ODT  Take 1 tablet (8 mg total) by mouth every 6 (six) hours as needed.     * sulfamethoxazole-trimethoprim 800-160mg 800-160 mg Tab  Commonly known as:  BACTRIM DS  Take 1 tablet by mouth 2 (two) times daily. for 7 days     teriparatide 20 mcg/dose - 600 mcg/2.4 mL Pnij  Commonly known as:  FORTEO  Inject 0.08 mLs (20 mcg total) into the skin once daily.         * This list has 1 medication(s) that are the same as other medications prescribed for you. Read the directions carefully, and ask your doctor or other care provider to review them with you.            CHANGE how you take these medications    * oxyCODONE-acetaminophen  mg per tablet  Commonly known as:  PERCOCET  Take 1 tablet by mouth every 4 to 6 hours as needed for Pain.  What changed:  Another medication with the same name was added. Make sure you understand how and when to take each.     * oxyCODONE-acetaminophen 5-325 mg per tablet  Commonly known as:  PERCOCET  Take 1 tablet by mouth every 4 (four) hours as needed.  What changed:  You were already taking a medication with the same name, and this prescription was added. Make sure you understand how and when to take each.         * This list has 2 medication(s) that are the same as other medications prescribed for you. Read the directions carefully, and ask your doctor or other care provider to review them with you.            CONTINUE taking these medications    amLODIPine 10 MG tablet  Commonly known as:  NORVASC  Take 10 mg by mouth once daily.     ELIQUIS 5 mg Tab  Generic drug:   apixaban  Take 5 mg by mouth 2 (two) times daily.     losartan 50 MG tablet  Commonly known as:  COZAAR        ASK your doctor about these medications    * sulfamethoxazole-trimethoprim 800-160mg 800-160 mg Tab  Commonly known as:  BACTRIM DS  Take 1 tablet by mouth 2 (two) times daily. for 7 days  Ask about: Should I take this medication?         * This list has 1 medication(s) that are the same as other medications prescribed for you. Read the directions carefully, and ask your doctor or other care provider to review them with you.                Sherry Flores MD  Orthopedics  [facility]

## 2018-09-26 ENCOUNTER — OFFICE VISIT (OUTPATIENT)
Dept: ORTHOPEDICS | Facility: CLINIC | Age: 53
End: 2018-09-26
Payer: MEDICARE

## 2018-09-26 ENCOUNTER — TELEPHONE (OUTPATIENT)
Dept: PHARMACY | Facility: CLINIC | Age: 53
End: 2018-09-26

## 2018-09-26 VITALS — WEIGHT: 160.06 LBS | HEIGHT: 65 IN | BODY MASS INDEX: 26.67 KG/M2

## 2018-09-26 DIAGNOSIS — S82.872E TYPE I OR II OPEN DISPLACED PILON FRACTURE OF LEFT TIBIA WITH ROUTINE HEALING, SUBSEQUENT ENCOUNTER: Primary | ICD-10-CM

## 2018-09-26 PROCEDURE — 99024 POSTOP FOLLOW-UP VISIT: CPT | Mod: ,,, | Performed by: ORTHOPAEDIC SURGERY

## 2018-09-26 PROCEDURE — 99999 PR PBB SHADOW E&M-EST. PATIENT-LVL II: CPT | Mod: PBBFAC,,, | Performed by: ORTHOPAEDIC SURGERY

## 2018-09-26 PROCEDURE — 99212 OFFICE O/P EST SF 10 MIN: CPT | Mod: PBBFAC | Performed by: ORTHOPAEDIC SURGERY

## 2018-09-26 NOTE — TELEPHONE ENCOUNTER
Patient will use sharps containeInitial Forteo consult completed on . Forteo picked up  $ 8.35 copay. Confirmed 2 patient identifiers - name and . Therapy Appropriate.    Counseled patient on administration directions:  -Keep your FORTEO delivery device in the refrigerator between 36° to 46°F (2° to 8°C).  -Do not use FORTEO after the expiration date printed on the delivery device and packaging. Throw away the -FORTEO delivery device after 28 days even if it has medicine in it   - Wash hands before and after injection.  - Monthly RX will come with pen needles and alcohol swabs.  - Patient may inject in either the tops of the thighs or abdomen- but at least 2 inches away from belly button.  Patient was instructed to rotate injections sites.  - Patient is to wipe down the injection site with the alcohol pad, wait to dry.    1. Pull off white cap,   2. Screw on new pen needle, remove outer cap.   3. Set dose by pulling out black injection button until it stops - make sure red line shows.  4. Remove inner needle cover.  5. Gently squeeze the area of the cleaned skin and hold it firmly. Insert needle straight into the skin. Push black injection button until it stops - hold and count to 5 to ensure complete dose administered.  6. Pull the needle from skin and confirm dose - black button all the way in confirms full dose administered.  7. Use large needle cover to remove needle safely and recap.    r to discard all injectable items; once full, per [place] law, she/ he may lock the sharps container and place in her trash. She/ he can then contact the Pharmacy and we will replace the sharps at no additional charge.    DDIs: Medication list reviewed. No DDIs or allergies    Patient was counseled on possible side effects: dizziness, joint pain, upset stomach.  Patient advised to administer injection while seated and to watch for symptoms of orthostatic hypotension.      [facility] also has an on-call pharmacist 24 hours a day.  Patient verbalized understanding. Consultation included: indication; goals of treatment; administration; storage and handling; side effects; how to handle side effects; the importance of compliance; how to handle missed doses; the importance of laboratory monitoring; the importance of keeping all follow up appointments. Reviewed importance of Ca + Vit D supplements and weight bearing exercises.  Fall risk factors reviewed.  Patient understands to report any medication changes to [facility] and provider. All questions answered and addressed to patients satisfaction. I will f/u with her in 1 week from start, [facility] to contact patient in 3 weeks for refills.     inbasket sent on supplements

## 2018-09-27 ENCOUNTER — HOSPITAL ENCOUNTER (OUTPATIENT)
Dept: RADIOLOGY | Facility: CLINIC | Age: 53
Discharge: HOME | End: 2018-09-27
Attending: PHYSICIAN ASSISTANT
Payer: MEDICARE

## 2018-09-27 ENCOUNTER — OFFICE VISIT (OUTPATIENT)
Dept: ORTHOPEDICS | Facility: CLINIC | Age: 53
End: 2018-09-27
Payer: MEDICARE

## 2018-09-27 VITALS — WEIGHT: 160.06 LBS | HEIGHT: 65 IN | BODY MASS INDEX: 26.67 KG/M2

## 2018-09-27 DIAGNOSIS — S82.872B OPEN PILON FRACTURE, LEFT, TYPE I OR II, INITIAL ENCOUNTER: ICD-10-CM

## 2018-09-27 DIAGNOSIS — M80.80XA PATHOLOGICAL FRACTURE DUE TO OSTEOPOROSIS, UNSPECIFIED FRACTURE SITE, UNSPECIFIED OSTEOPOROSIS TYPE, INITIAL ENCOUNTER: ICD-10-CM

## 2018-09-27 DIAGNOSIS — M81.0 OSTEOPOROSIS, UNSPECIFIED OSTEOPOROSIS TYPE, UNSPECIFIED PATHOLOGICAL FRACTURE PRESENCE: ICD-10-CM

## 2018-09-27 DIAGNOSIS — E55.9 VITAMIN D DEFICIENCY: Primary | ICD-10-CM

## 2018-09-27 DIAGNOSIS — Z13.820 SCREENING FOR OSTEOPOROSIS: ICD-10-CM

## 2018-09-27 DIAGNOSIS — M81.6 LOCALIZED OSTEOPOROSIS OF LEQUESNE: ICD-10-CM

## 2018-09-27 PROCEDURE — 3008F BODY MASS INDEX DOCD: CPT | Mod: CPTII,,, | Performed by: PHYSICIAN ASSISTANT

## 2018-09-27 PROCEDURE — 99999 PR PBB SHADOW E&M-EST. PATIENT-LVL III: CPT | Mod: PBBFAC,,, | Performed by: PHYSICIAN ASSISTANT

## 2018-09-27 PROCEDURE — 99213 OFFICE O/P EST LOW 20 MIN: CPT | Mod: S$PBB,24,, | Performed by: PHYSICIAN ASSISTANT

## 2018-09-27 PROCEDURE — 99213 OFFICE O/P EST LOW 20 MIN: CPT | Mod: PBBFAC,25

## 2018-09-27 PROCEDURE — 77080 DXA BONE DENSITY AXIAL: CPT | Mod: TC

## 2018-09-27 PROCEDURE — 77080 DXA BONE DENSITY AXIAL: CPT | Mod: 26,,, | Performed by: INTERNAL MEDICINE

## 2018-09-27 RX ORDER — ERGOCALCIFEROL 1.25 MG/1
50000 CAPSULE ORAL
Qty: 6 CAPSULE | Refills: 0 | Status: SHIPPED | OUTPATIENT
Start: 2018-09-27 | End: 2018-11-02

## 2018-09-27 NOTE — PROGRESS NOTES
SUBJECTIVE:     Chief Complaint & History of Present Illness:  Elias Phelps is a new patient 52 y.o. male who is seen here today for a bone health evaluation for osteoporosis, fracture prevention, and risk evaluation for future fractures.   he is accompanied by wife  today who is helpful with his history.     he was appropriately identified and referred by NARCISO Shearer MD  due to concerns for compromised bone quality, and risk of non union fracture of his right tibia initially suffered approximately 40 days ago.  Patient has undergone multiple procedures and is at risk for nonunion fracture. .  This visit is medically necessary to identify risk, investigate and initiative treatment as appropriate to improve bone quality and strength for the reduction risk of nonunion fracture.     his medical history, medications and fracture history will be reviewed and summarized      Review of patient's allergies indicates:  No Known Allergies      Current Outpatient Medications   Medication Sig Dispense Refill    amLODIPine (NORVASC) 10 MG tablet Take 10 mg by mouth once daily.       apixaban (ELIQUIS) 2.5 mg Tab Take 5 mg by mouth.      docusate sodium (COLACE) 100 MG capsule Take 1 capsule (100 mg total) by mouth 2 (two) times daily as needed for Constipation. 60 capsule 0    ELIQUIS 5 mg Tab Take 5 mg by mouth 2 (two) times daily.       ergocalciferol (ERGOCALCIFEROL) 50,000 unit Cap Take 1 capsule (50,000 Units total) by mouth every 7 days. for 6 doses 6 capsule 0    losartan (COZAAR) 50 MG tablet       medical supply, miscellaneous (MISCELLANEOUS MEDICAL SUPPLY MISC) Rolling walker      methocarbamol (ROBAXIN) 750 MG Tab Take 1 tablet (750 mg total) by mouth 3 (three) times daily. for 10 days 30 tablet 0    ondansetron (ZOFRAN-ODT) 8 MG TbDL Take 1 tablet (8 mg total) by mouth every 6 (six) hours as needed. 30 tablet 0    oxyCODONE-acetaminophen (PERCOCET)  mg per tablet Take 1 tablet by mouth every 4 to 6  hours as needed for Pain. 42 tablet 0    oxyCODONE-acetaminophen (PERCOCET) 5-325 mg per tablet Take 1 tablet by mouth every 4 (four) hours as needed. 41 tablet 0    teriparatide (FORTEO) 20 mcg/dose - 600 mcg/2.4 mL PnIj Inject 0.08 mLs (20 mcg total) into the skin once daily. 28.8 mL 0     No current facility-administered medications for this visit.        Past Medical History:   Diagnosis Date    Hypertension     Pulmonary embolism     2016       Past Surgical History:   Procedure Laterality Date    ANKLE FRACTURE SURGERY      CLOSED REDUCTION OF INJURY OF TIBIA Right 9/18/2018    Procedure: CLOSED REDUCTION, TIBIA;  Surgeon: Bharathi Shearer MD;  Location: [facility] OR [address] [place] [address];  Service: Orthopedics;  Laterality: Right;    CLOSED REDUCTION, TIBIA Right 9/18/2018    Performed by Bharathi Shearer MD at [facility] OR [address] [place] [address]    ELBOW SURGERY      left elbow    FIBULA FRACTURE SURGERY Left     GALLBLADDER SURGERY N/A 1991    REMOVAL OF FOREIGN BODY FROM LOWER EXTREMITY Right 9/18/2018    Procedure: REMOVAL, FOREIGN BODY, LOWER EXTREMITY;  Surgeon: Bharathi Shearer MD;  Location: [facility] OR [address] [place] [address];  Service: Orthopedics;  Laterality: Right;    REMOVAL, FOREIGN BODY, LOWER EXTREMITY Right 9/18/2018    Performed by Bharathi Shearer MD at [facility] OR [address] [place] [address]    REVISION OF PROCEDURE INVOLVING EXTERNAL FIXATION DEVICE Right 9/18/2018    Procedure: REVISION, OF EXTERNAL FIXATION;  Surgeon: Bharathi Shearer MD;  Location: [address];  Service: Orthopedics;  Laterality: Right;    REVISION, OF EXTERNAL FIXATION Right 9/18/2018    Performed by Bharathi Shearer MD at [facility] OR [address] [place] [address]       Lab Results   Component Value Date     09/27/2018    K 4.2 09/27/2018     09/27/2018    CO2 23 09/27/2018    GLU 85 09/27/2018    BUN 18 09/27/2018    CREATININE 1.2 09/27/2018    CALCIUM 9.8 09/27/2018    PROT 8.3 09/27/2018    ALBUMIN 3.8 09/27/2018    BILITOT 0.3 09/27/2018    ALKPHOS 93 09/27/2018    AST 28 09/27/2018     ALT 35 09/27/2018    ANIONGAP 8 09/27/2018    ESTGFRAFRICA >60.0 09/27/2018    EGFRNONAA >60.0 09/27/2018      Lab Results   Component Value Date    WBC 6.87 09/12/2018    RBC 4.17 (L) 09/12/2018    HGB 12.1 (L) 09/12/2018    HCT 39.3 (L) 09/12/2018    MCV 94 09/12/2018    MCH 29.0 09/12/2018    MCHC 30.8 (L) 09/12/2018    RDW 13.6 09/12/2018     09/12/2018    MPV 9.1 (L) 09/12/2018    GRAN 4.3 09/12/2018    GRAN 62.2 09/12/2018    LYMPH 1.8 09/12/2018    LYMPH 26.2 09/12/2018    MONO 0.5 09/12/2018    MONO 7.9 09/12/2018    EOS 0.2 09/12/2018    BASO 0.02 09/12/2018    EOSINOPHIL 2.5 09/12/2018    BASOPHIL 0.3 09/12/2018    DIFFMETHOD Automated 09/12/2018      No results found for: MG   No results found for: PHOS   Lab Results   Component Value Date    XCZYTULF55TG 13 (L) 09/27/2018      Lab Results   Component Value Date    PTH 54.0 09/27/2018      Lab Results   Component Value Date    TSH 1.766 09/27/2018      Lab Results   Component Value Date    FREET4 1.02 09/27/2018      No results found for: HGBA1C, ESTIMATEDAVG   No results found for: FREETESTOSTE         Vital Signs (Most Recent)  There were no vitals filed for this visit.      Today's Visit and Bone Health History     Have you had any loss of height or gotten shorter since your 20's?  0   Are you postmenopausal?  Not Menopausal   Have you ever been told you have low testosterone? No   Have you ever taken any type of hormone replacement therapy? No   If you have had hormone replacement therapy please indicate which hormone therapy was used and for how long. na   Do you currently smoke? Yes   Have you ever smoked in the past? Yes   How many alcoholic beverages do you drink per day? 1 to 3   How many caffeinated beverages do you drink per day? 0   Have you had 2 or more falls in the past 12 months? Yes   How active have you been in the past 12 months? Not able to walk   Did either of your parents have a hip fracture after the age of 50?    Have you  "ever been diagnosed with any of the following?    Do you currently have a fracture?    If you currently have a fracture please indicate where and when the fracture occured.    Have you broken any other bones since you turned 50 or older?    Please list all bones broken since you turned 50 or older.    Have you ever had a bone density test or DXA scan?    Are you currently taking or have you ever taken any of the following medications?    Do you take Calcium?    If you take Calcium what dose?    Do you take Vitamin D?    If you take Vitamin D what dose?    Have you ever been diagnosed with cancer?    Please indicate what type of cancer and when you were diagnosed.    Have you ever been treated for cancer with high beam radiation or had radioactive implants?    If you have been treated for cancer with high beam radiation or had radioactive implants please          he has medical history and medication use known to be associated with bone loss and osteoporosis to include failed ORIF of the right tibia, positive history of smoking the opioid pain medication.     The last DXA was performed in 09/27/2018.          T-Score Hip: 1.0     T-Score Spine: 1.0      FRAX:      Major Fx.: 2.2%         Hip Fx.: 0.1%      Review of Systems:  ROS:  Constitutional: no fever or chills  Eyes: no visual changes  ENT: no nasal congestion or sore throat  Respiratory: no respiratory symptoms  Cardiovascular: no chest pain or palpitations  Gastrointestinal: no nausea or vomiting, tolerating diet  Genitourinary: no hematuria or dysuria  Integument/Breast: no rash or pruritis  Hematologic/Lymphatic: no easy bruising or lymphadenopathy  Musculoskeletal: no arthralgias or myalgias  Neurological: no seizures or tremors  Behavioral/Psych: no auditory or visual hallucinations  Endocrine: no heat or cold intolerance      OBJECTIVE:     PHYSICAL EXAM:  Height: 5' 5" (165.1 cm) Weight: 72.6 kg (160 lb 0.9 oz),                  General: no acute " distress and well developed/well nourished  Behavioral/Psych: normal judgment and insight and normal mood/affect  Skin:  Healing incision over lower leg external fixator in place  Head/Neck: atraumatic, normocephalic, without obvious abnormality  Respiratory: normal respiratory effort  Cardiac: regular rate and rhythm  Vascular: pulses present  Abdomen: soft, non-tender  Musculoskeletal: no joint tenderness, deformity or swelling, external fixator on right lower extremity               ASSESSMENT/PLAN:     Assessment:    , at high risk for a nonunion fracture of the right distal tibia.    Plan:   30-45 minutes spent in face-to-face consultation with patient and his family members today,.  I have explained that bone strength is equal to bone quality. A bone density / DEXA scan is important to complement his care since his fracture was by definition a fragility fracture/traumatic fracture.  Over half of the encounter was spent (50%) counseling the patient on the disease of osteoporosis evidence-based and best practice treatment options available as well as recommendations for improvement of bone quality, the importance of nutritional supplements to include:  Calcium 3509-2429 mg daily in divided doses   Vitamin D3  6943-2237 units daily in divided doses.   Fall prevention and personal safety for the reduction of future fractures.    Clinicians Guidelines for the treatment of Osteoporosis 2017 as part of the National Osteoporosis Foundation were utilized as the evidence-based information provided.    Discussed pharmaceutical treatment options to include Biphosphatases, Denosumab, Abaloparatide and Teriparatide. Information to include indications for therapy, risks and benefits to treatment, and important safety information related to these treatments was provided and discussed.  Handouts were given to the patient for his review on osteoporosis and other pharmacological treatment information related to other available  treatment options.    The importance of diet, impact exercise, and core strengthening with gait and balance exercise, through  both formal physical therapy programs and home exercise programs was discussed.         Patient is already for received Forteo.  Vitamin-D level tested today is 13.  With this in mind we will order high dose vitamin-D 91089 units Q weekly x6 weeks.  We will wait 2 weeks to begin Forteo.    The will repeat of vitamin-D and calcium levels after initiation of treatment

## 2018-10-01 NOTE — PROGRESS NOTES
HPI:  53 yo male with open left tibial pilon fracture with fibula on 8/9/18 treated initially with D&I, fibular plating spanning ex fix of the tibia with limited internal fixation.  I met him on 9/16.  At that point, he'd fallen into a bit of varus with some medial displacement of the tibia.  I took him to the OR and revised the ex fix with new tibial and calcaneal pins and included the first and fifth metatarsals.  I also removed an antibiotic spacer from the tibia and placed infuse BMP2.  He is doing well.  His pain is well controlled on PO pain medication.    PE:  Pin sites look good.  Medial incision well healing with no signs of infection.  Alignment looks good.    A/P:  One week status post revision of ex fix, 6 weeks post injury left open pilon with fibula fracture.  We again discussed options.  I changed out all of the pins in case we decided to proceed with IM nail fixation.  At this point, I think the wiser decision from an infection standpoint with his open fracture may be definitive external fixation then casting.  I included the metatarsals in the new construct to that end.  He is amenable to that plan.  He will RTC next week for suture removal.  I have also started him on Forteo to promote fracture healing in the tibia given that it is an anabolic osteoporosis medication and getting the fracture to heal will more likely avoid the need for larger future surgeries other than eventually removing the ex fix.

## 2018-10-03 ENCOUNTER — TELEPHONE (OUTPATIENT)
Dept: ADMINISTRATIVE | Facility: CLINIC | Age: 53
End: 2018-10-03

## 2018-10-03 ENCOUNTER — OFFICE VISIT (OUTPATIENT)
Dept: ORTHOPEDICS | Facility: CLINIC | Age: 53
End: 2018-10-03
Payer: MEDICARE

## 2018-10-03 DIAGNOSIS — S82.872E TYPE I OR II OPEN DISPLACED PILON FRACTURE OF LEFT TIBIA WITH ROUTINE HEALING, SUBSEQUENT ENCOUNTER: Primary | ICD-10-CM

## 2018-10-03 PROCEDURE — 99024 POSTOP FOLLOW-UP VISIT: CPT | Mod: ,,, | Performed by: ORTHOPAEDIC SURGERY

## 2018-10-03 PROCEDURE — 99212 OFFICE O/P EST SF 10 MIN: CPT | Mod: PBBFAC | Performed by: ORTHOPAEDIC SURGERY

## 2018-10-03 PROCEDURE — 99999 PR PBB SHADOW E&M-EST. PATIENT-LVL II: CPT | Mod: PBBFAC,,, | Performed by: ORTHOPAEDIC SURGERY

## 2018-10-03 RX ORDER — OXYCODONE AND ACETAMINOPHEN 10; 325 MG/1; MG/1
1 TABLET ORAL EVERY 4 HOURS PRN
Qty: 50 TABLET | Refills: 0 | Status: SHIPPED | OUTPATIENT
Start: 2018-10-03 | End: 2018-10-24

## 2018-10-03 NOTE — TELEPHONE ENCOUNTER
Home Health SOC 09/22/2018 - 11/20/2018 with [facility] ([place] - Dr. Bharathi Shearer. Patient received SN and PT services.

## 2018-10-04 NOTE — PROGRESS NOTES
HPI:  51 yo male with open left tibial pilon fracture with fibula on 8/9/18 treated initially with I&D, fibular plating spanning ex fix of the tibia with limited internal fixation.  I met him on 9/16.  At that point, he'd fallen into a bit of varus with some medial displacement of the tibia.  I took him to the OR and revised the ex fix with new tibial and calcaneal pins and included the first and fifth metatarsals.  I also removed an antibiotic spacer from the tibia and placed infuse BMP2.    He is doing well.  His pain is well controlled on PO pain medication.Started forteo last week    PE:  Pin sites look good.  Medial incision well healing with no signs of infection.  Alignment looks good. Nylon sutures removed in clinic.     Fracture views under fluoro in clinic. Fracture is well aligned. No significant changes.     A/P:  Two weeks status post revision of ex fix, 6 weeks post injury left open pilon with fibula fracture. Plan is definitive ex fix and casting. He is on forteo to promote fracture healing. Continue pin site cleaning BID.  RTC in 2 weeks with x rays of the left ankle.

## 2018-10-11 ENCOUNTER — OFFICE VISIT (OUTPATIENT)
Dept: ORTHOPEDICS | Facility: CLINIC | Age: 53
End: 2018-10-11
Payer: MEDICARE

## 2018-10-11 VITALS — SYSTOLIC BLOOD PRESSURE: 120 MMHG | DIASTOLIC BLOOD PRESSURE: 83 MMHG | HEART RATE: 102 BPM

## 2018-10-11 DIAGNOSIS — M80.80XA PATHOLOGICAL FRACTURE DUE TO OSTEOPOROSIS, UNSPECIFIED FRACTURE SITE, UNSPECIFIED OSTEOPOROSIS TYPE, INITIAL ENCOUNTER: Primary | ICD-10-CM

## 2018-10-11 PROCEDURE — 99213 OFFICE O/P EST LOW 20 MIN: CPT | Mod: PBBFAC | Performed by: PHYSICIAN ASSISTANT

## 2018-10-11 PROCEDURE — 99999 PR PBB SHADOW E&M-EST. PATIENT-LVL III: CPT | Mod: PBBFAC,,, | Performed by: PHYSICIAN ASSISTANT

## 2018-10-11 PROCEDURE — 99499 UNLISTED E&M SERVICE: CPT | Mod: S$PBB,,, | Performed by: PHYSICIAN ASSISTANT

## 2018-10-11 NOTE — PROGRESS NOTES
Patient's visit today was to review lab results primarily vitamin-D and calcium levels patient has not had a labs drawn we will make this a no-charge visit I will contact him with his results

## 2018-10-24 ENCOUNTER — OFFICE VISIT (OUTPATIENT)
Dept: ORTHOPEDICS | Facility: CLINIC | Age: 53
End: 2018-10-24
Payer: MEDICARE

## 2018-10-24 ENCOUNTER — HOSPITAL ENCOUNTER (OUTPATIENT)
Dept: RADIOLOGY | Facility: HOSPITAL | Age: 53
Discharge: HOME | End: 2018-10-24
Attending: PHYSICIAN ASSISTANT
Payer: MEDICARE

## 2018-10-24 VITALS
TEMPERATURE: 98 F | RESPIRATION RATE: 18 BRPM | HEIGHT: 65 IN | BODY MASS INDEX: 26.67 KG/M2 | SYSTOLIC BLOOD PRESSURE: 127 MMHG | HEART RATE: 89 BPM | DIASTOLIC BLOOD PRESSURE: 86 MMHG | WEIGHT: 160.06 LBS

## 2018-10-24 DIAGNOSIS — Z09 FRACTURE FOLLOW-UP: Primary | ICD-10-CM

## 2018-10-24 DIAGNOSIS — G89.18 POST-OPERATIVE PAIN: ICD-10-CM

## 2018-10-24 DIAGNOSIS — M25.572 ACUTE LEFT ANKLE PAIN: ICD-10-CM

## 2018-10-24 DIAGNOSIS — S82.872E TYPE I OR II OPEN DISPLACED PILON FRACTURE OF LEFT TIBIA WITH ROUTINE HEALING, SUBSEQUENT ENCOUNTER: ICD-10-CM

## 2018-10-24 PROCEDURE — 99024 POSTOP FOLLOW-UP VISIT: CPT | Mod: ,,, | Performed by: PHYSICIAN ASSISTANT

## 2018-10-24 PROCEDURE — 73610 X-RAY EXAM OF ANKLE: CPT | Mod: 26,LT,, | Performed by: RADIOLOGY

## 2018-10-24 PROCEDURE — 73610 X-RAY EXAM OF ANKLE: CPT | Mod: TC,LT

## 2018-10-24 PROCEDURE — 99214 OFFICE O/P EST MOD 30 MIN: CPT | Mod: PBBFAC,25 | Performed by: PHYSICIAN ASSISTANT

## 2018-10-24 PROCEDURE — 99999 PR PBB SHADOW E&M-EST. PATIENT-LVL IV: CPT | Mod: PBBFAC,,, | Performed by: PHYSICIAN ASSISTANT

## 2018-10-24 RX ORDER — OXYCODONE AND ACETAMINOPHEN 10; 325 MG/1; MG/1
1 TABLET ORAL EVERY 6 HOURS PRN
Qty: 42 TABLET | Refills: 0 | Status: SHIPPED | OUTPATIENT
Start: 2018-10-24 | End: 2018-11-07 | Stop reason: SDUPTHER

## 2018-10-24 NOTE — PROGRESS NOTES
HPI:  53 yo male with open left tibial pilon fracture with fibula on 8/9/18 treated initially with I&D, fibular plating spanning ex fix of the tibia with limited internal fixation. He was seen on 9/16.  At that point, he'd fallen into a bit of varus with some medial displacement of the tibia.  He was taken to the OR and revised the ex fix with new tibial and calcaneal pins and included the first and fifth metatarsals.  I also removed an antibiotic spacer from the tibia and placed infuse BMP2.    He is doing well.  His pain is well controlled on PO pain medication.Started forteo.     PE:  Pin sites look good.  Medial incision well healing with no signs of infection, well healed.  Alignment looks good.    RADS: As noted previously there is approximately 8 mm lateral displacement of the proximal fibular shaft with respect to distal fragments.  Some callus has developed along the medial margin of the distal tibia at the proximal margin of the fracture.  Varus angulation at the fracture site observed on 09/07/2018 has been reduced in the interim.    There is been plate and screw construct applied to the lateral margin of the distal fibular diaphysis and lateral malleolus with screws in the lateral aspect of the distal tibial metaphysis.  This hardware and related fragments remain in satisfactory position and alignment.  No new periosteal bone formation identified at the medial margin of the fibular fracture.    A/P: 4 weeks status post revision of ex fix, 10 weeks post injury left open pilon with fibula fracture. Plan is definitive ex fix and casting. He is on forteo to promote fracture healing. Continue pin site cleaning BID.  RTC in 2 weeks with x rays of the left ankle at time consider removal of external fixation device pending healing.

## 2018-10-26 ENCOUNTER — TELEPHONE (OUTPATIENT)
Dept: PHARMACY | Facility: CLINIC | Age: 53
End: 2018-10-26

## 2018-11-07 ENCOUNTER — OFFICE VISIT (OUTPATIENT)
Dept: ORTHOPEDICS | Facility: CLINIC | Age: 53
End: 2018-11-07
Payer: MEDICARE

## 2018-11-07 ENCOUNTER — HOSPITAL ENCOUNTER (OUTPATIENT)
Dept: RADIOLOGY | Facility: HOSPITAL | Age: 53
Discharge: HOME | End: 2018-11-07
Attending: PHYSICIAN ASSISTANT
Payer: MEDICARE

## 2018-11-07 VITALS
HEIGHT: 65 IN | DIASTOLIC BLOOD PRESSURE: 66 MMHG | SYSTOLIC BLOOD PRESSURE: 110 MMHG | HEART RATE: 81 BPM | BODY MASS INDEX: 26.67 KG/M2 | RESPIRATION RATE: 16 BRPM | TEMPERATURE: 98 F | WEIGHT: 160.06 LBS

## 2018-11-07 DIAGNOSIS — T14.90XA TRAUMA: ICD-10-CM

## 2018-11-07 DIAGNOSIS — S82.872E TYPE I OR II OPEN DISPLACED PILON FRACTURE OF LEFT TIBIA WITH ROUTINE HEALING, SUBSEQUENT ENCOUNTER: Primary | ICD-10-CM

## 2018-11-07 PROCEDURE — 99999 PR PBB SHADOW E&M-EST. PATIENT-LVL IV: CPT | Mod: PBBFAC,,, | Performed by: PHYSICIAN ASSISTANT

## 2018-11-07 PROCEDURE — 73610 X-RAY EXAM OF ANKLE: CPT | Mod: 26,LT,, | Performed by: RADIOLOGY

## 2018-11-07 PROCEDURE — 73610 X-RAY EXAM OF ANKLE: CPT | Mod: TC,LT

## 2018-11-07 PROCEDURE — 99024 POSTOP FOLLOW-UP VISIT: CPT | Mod: S$GLB,,, | Performed by: PHYSICIAN ASSISTANT

## 2018-11-07 RX ORDER — OXYCODONE AND ACETAMINOPHEN 10; 325 MG/1; MG/1
1 TABLET ORAL EVERY 4 HOURS PRN
Qty: 42 TABLET | Refills: 0 | Status: SHIPPED | OUTPATIENT
Start: 2018-11-07 | End: 2018-11-20 | Stop reason: SDUPTHER

## 2018-11-09 NOTE — PROGRESS NOTES
HPI:  51 yo male with open left tibial pilon fracture with fibula on 8/9/18 treated initially with I&D, fibular plating spanning ex fix of the tibia with limited internal fixation. He was seen on 9/16.  At that point, he'd fallen into a bit of varus with some medial displacement of the tibia.  He was taken to the OR and revised the ex fix with new tibial and calcaneal pins and included the first and fifth metatarsals. At that time he also had removed an antibiotic spacer from the tibia and placed infuse BMP2.    He is doing well.  His pain is well controlled on PO pain medication.Started forteo.     PE:  Pin sites look good.  Medial incision well healing with no signs of infection, well healed.  Alignment looks good.- moderate swelling    RADS: Once again identified are an external fixation device, a laterally positioned plate/screw construct within the distal left fibula bridging a distal left fibular shaft fracture, 2 threaded pins oriented in an anteroposterior direction within the lateral aspect of the left distal tibial metaphysis, and a comminuted fracture involving the left distal tibia centered at the metaphyseal/diaphyseal junction.  Appearance of the osseous structures is essentially unchanged since 10/24/2018, and there has been no significant interval healing of the distal tibial and fibular fractures since that time observed.  Lucencies related to pin tracks from an older external fixation device are seen within the proximal left tibial shaft.  No superimposed more recent fracture or lytic destructive process noted.  Soft tissue swelling about the ankle, both medially and laterally, is greater on this examination than on 10/24/2018.    A/P: 6 weeks status post revision of ex fix, 112 weeks post injury left open pilon with fibula fracture. Plan is definitive ex fix . He is on forteo to promote fracture healing. Continue pin site cleaning BID.  RTC in 4 weeks with x rays of the left ankle at time consider  removal of external fixation device pending healing plan is for 10-12 weeks post op.

## 2018-11-20 DIAGNOSIS — S82.872E TYPE I OR II OPEN DISPLACED PILON FRACTURE OF LEFT TIBIA WITH ROUTINE HEALING, SUBSEQUENT ENCOUNTER: Primary | ICD-10-CM

## 2018-11-20 RX ORDER — OXYCODONE AND ACETAMINOPHEN 10; 325 MG/1; MG/1
1 TABLET ORAL
Qty: 42 TABLET | Refills: 0 | Status: SHIPPED | OUTPATIENT
Start: 2018-11-20 | End: 2018-12-05 | Stop reason: SDUPTHER

## 2018-11-20 NOTE — PROGRESS NOTES
One time pain medication refilled for patient of Dr. Shearer. Trauma providers are currently out of town.

## 2018-11-21 ENCOUNTER — TELEPHONE (OUTPATIENT)
Dept: PHARMACY | Facility: CLINIC | Age: 53
End: 2018-11-21

## 2018-12-05 ENCOUNTER — HOSPITAL ENCOUNTER (OUTPATIENT)
Dept: RADIOLOGY | Facility: HOSPITAL | Age: 53
Discharge: HOME | End: 2018-12-05
Attending: PHYSICIAN ASSISTANT
Payer: MEDICARE

## 2018-12-05 ENCOUNTER — OFFICE VISIT (OUTPATIENT)
Dept: ORTHOPEDICS | Facility: CLINIC | Age: 53
End: 2018-12-05
Payer: MEDICARE

## 2018-12-05 VITALS
WEIGHT: 160.06 LBS | HEART RATE: 107 BPM | TEMPERATURE: 98 F | HEIGHT: 65 IN | RESPIRATION RATE: 18 BRPM | DIASTOLIC BLOOD PRESSURE: 72 MMHG | SYSTOLIC BLOOD PRESSURE: 118 MMHG | BODY MASS INDEX: 26.67 KG/M2

## 2018-12-05 DIAGNOSIS — T14.90XA TRAUMA: Primary | ICD-10-CM

## 2018-12-05 DIAGNOSIS — S82.872E TYPE I OR II OPEN DISPLACED PILON FRACTURE OF LEFT TIBIA WITH ROUTINE HEALING, SUBSEQUENT ENCOUNTER: ICD-10-CM

## 2018-12-05 DIAGNOSIS — T14.90XA TRAUMA: ICD-10-CM

## 2018-12-05 PROCEDURE — 73610 X-RAY EXAM OF ANKLE: CPT | Mod: TC,LT

## 2018-12-05 PROCEDURE — 99999 PR PBB SHADOW E&M-EST. PATIENT-LVL IV: CPT | Mod: PBBFAC,,, | Performed by: PHYSICIAN ASSISTANT

## 2018-12-05 PROCEDURE — 73610 X-RAY EXAM OF ANKLE: CPT | Mod: 26,LT,, | Performed by: RADIOLOGY

## 2018-12-05 PROCEDURE — 99024 POSTOP FOLLOW-UP VISIT: CPT | Mod: S$GLB,,, | Performed by: PHYSICIAN ASSISTANT

## 2018-12-05 RX ORDER — OXYCODONE AND ACETAMINOPHEN 10; 325 MG/1; MG/1
1 TABLET ORAL
Qty: 42 TABLET | Refills: 0 | Status: ON HOLD | OUTPATIENT
Start: 2018-12-05 | End: 2018-12-14 | Stop reason: HOSPADM

## 2018-12-06 PROBLEM — T14.90XA TRAUMA: Status: ACTIVE | Noted: 2018-12-06

## 2018-12-06 RX ORDER — SODIUM CHLORIDE 9 MG/ML
INJECTION, SOLUTION INTRAVENOUS CONTINUOUS
Status: CANCELLED | OUTPATIENT
Start: 2018-12-06

## 2018-12-06 RX ORDER — MUPIROCIN 20 MG/G
OINTMENT TOPICAL
Status: CANCELLED | OUTPATIENT
Start: 2018-12-06

## 2018-12-06 NOTE — H&P (VIEW-ONLY)
Subjective:      Patient ID: Elias Phelps is a 53 y.o. male.    Chief Complaint: Pre-op Exam (left ankle)    51 yo male with PM Hx of HTN and PE 2016 had open left tibial pilon fracture with fibula on 8/9/18 treated initially with I&D, fibular plating spanning ex fix of the tibia with limited internal fixation. He was seen on 9/16.  At that point, he'd fallen into a bit of varus with some medial displacement of the tibia.  He was taken to the OR and revised the ex fix with new tibial and calcaneal pins and included the first and fifth metatarsals. At that time he also had removed an antibiotic spacer from the tibia and placed infuse BMP2.     Past Medical History:   Diagnosis Date    Hypertension     Pulmonary embolism     2016     Past Surgical History:   Procedure Laterality Date    ANKLE FRACTURE SURGERY      CLOSED REDUCTION OF INJURY OF TIBIA Right 9/18/2018    Procedure: CLOSED REDUCTION, TIBIA;  Surgeon: Bharathi Shearer MD;  Location: [address];  Service: Orthopedics;  Laterality: Right;    CLOSED REDUCTION, TIBIA Right 9/18/2018    Performed by Bharathi Shearer MD at [facility] OR [address] [place] [address]    ELBOW SURGERY      left elbow    FIBULA FRACTURE SURGERY Left     GALLBLADDER SURGERY N/A 1991    REMOVAL OF FOREIGN BODY FROM LOWER EXTREMITY Right 9/18/2018    Procedure: REMOVAL, FOREIGN BODY, LOWER EXTREMITY;  Surgeon: Bharathi Shearer MD;  Location: [address];  Service: Orthopedics;  Laterality: Right;    REMOVAL, FOREIGN BODY, LOWER EXTREMITY Right 9/18/2018    Performed by Bharathi Shearer MD at [facility] OR [address] [place] [address]    REVISION OF PROCEDURE INVOLVING EXTERNAL FIXATION DEVICE Right 9/18/2018    Procedure: REVISION, OF EXTERNAL FIXATION;  Surgeon: Bharathi Shearer MD;  Location: [address];  Service: Orthopedics;  Laterality: Right;    REVISION, OF EXTERNAL FIXATION Right 9/18/2018    Performed by Bharathi Shearer MD at [facility] OR [address] [place] [address]     Social History     Occupational History    Not on  "file   Tobacco Use    Smoking status: Current Every Day Smoker     Packs/day: 0.50     Types: Cigarettes    Smokeless tobacco: Never Used   Substance and Sexual Activity    Alcohol use: Yes     Comment: ocassionallly    Drug use: No    Sexual activity: Not on file      ROS  Current Outpatient Medications on File Prior to Visit   Medication Sig Dispense Refill    amLODIPine (NORVASC) 10 MG tablet Take 10 mg by mouth once daily.       ELIQUIS 5 mg Tab Take 5 mg by mouth 2 (two) times daily.       losartan (COZAAR) 50 MG tablet       medical supply, miscellaneous (MISCELLANEOUS MEDICAL SUPPLY MISC) Rolling walker      teriparatide (FORTEO) 20 mcg/dose - 600 mcg/2.4 mL PnIj Inject 0.08 mLs (20 mcg total) into the skin once daily. 28.8 mL 0     No current facility-administered medications on file prior to visit.      Review of patient's allergies indicates:  No Known Allergies      Objective:      Vitals:    12/05/18 0853   BP: 118/72   Pulse: 107   Resp: 18   Temp: 98.2 °F (36.8 °C)   TempSrc: Oral   Weight: 72.6 kg (160 lb 0.9 oz)   Height: 5' 5" (1.651 m)     Ortho Exam     Gen: WD, WN in NAD   HEENT: NC/AT   Heart: RR   Resp: unlabored breathing   Skin: intact, no lesions pertinent to the surgery site    Assessment:       1. Trauma    2. Type I or II open displaced pilon fracture of left tibia with routine healing, subsequent encounter          Plan:       Surgical intervention per .       "

## 2018-12-06 NOTE — PROGRESS NOTES
HPI:  51 yo male with open left tibial pilon fracture with fibula on 8/9/18 treated initially with I&D, fibular plating spanning ex fix of the tibia with limited internal fixation. He was seen on 9/16.  At that point, he'd fallen into a bit of varus with some medial displacement of the tibia.  He was taken to the OR and revised the ex fix with new tibial and calcaneal pins and included the first and fifth metatarsals. At that time he also had removed an antibiotic spacer from the tibia and placed infuse BMP2.    He is doing well.  His pain is well controlled on PO pain medication.Started forteo which he has been taking every day.     PE:  Pin sites look good.  Medial incision well healing with no signs of infection, well healed.  Alignment looks good.- moderate swelling    RADS: There is a fracture of the distal fibula with a sideplate.  There is a distal tibial fracture severely comminuted.  There is an Ilizarov device.  There is good alignment and no complication  - fracture incompletely healed    A/P: 11 weeks status post revision of ex fix, 16 weeks post injury left open pilon with fibula fracture.   Discussed treatment options with patient. Operative vs non-operative treatment discussed with patient. Recommend operative removal of external fixation devics. Patient agreed. Plan is for removal on 12/14/2018.      - rest ice and elevation to reduce swelling.    Discussed proper and consistent elevation of lower extremities, above the level of the heart, while at rest, to help control/improve edema and decrease pain.  - NWB, has wheelchair and walker  -  He is on forteo to promote fracture healing.   Continue pin site cleaning BID.    - Pain medication: refill needed,     - Discussed pain medication refill policy.      - consents signed,    - lab, chest x-ray and EKG ordered  previously , results in chart  - he is taking blood thinners, instructed on holding prior to surgery.        Pre, claudine, and post operative  procedure and expectations were discussed.  Questions were answered. The patient has been educated and is ready to proceed with surgery.  Approximately 30 minutes was spent discussing surgical outcomes, plans, procedures, pre, claudine, and post operative expectations and care. The risks, benefits and alternatives to surgery were discussed with the patient at great length.  These include bleeding, infection, vessel/nerve damage, pain, numbness, tingling, complex regional pain syndrome, hardware/surgical failure, need for further surgery, malunion, nonunion, DVT, PE, arthritis and death. He also understands that the risks of surgery may be greater for some patients due to health or lifestyle issues, such as a current condition or a history of heart disease, obesity, clotting disorders, recurrent infections, smoking, sedentary lifestyle, or noncompliance with medications, therapy, or followup. The degree of the increased risk is hard to estimate with any degree of precision.  Patient states an understanding and wishes to proceed with surgery.   All questions were answered.  No guarantees were implied or stated.  Informed consent was obtained  The patient will contact us if the have any questions, concerns, and changes in their medical condition prior to surgery.

## 2018-12-06 NOTE — H&P
Subjective:      Patient ID: Eilas Phelps is a 53 y.o. male.    Chief Complaint: Pre-op Exam (left ankle)    53 yo male with PM Hx of HTN and PE 2016 had open left tibial pilon fracture with fibula on 8/9/18 treated initially with I&D, fibular plating spanning ex fix of the tibia with limited internal fixation. He was seen on 9/16.  At that point, he'd fallen into a bit of varus with some medial displacement of the tibia.  He was taken to the OR and revised the ex fix with new tibial and calcaneal pins and included the first and fifth metatarsals. At that time he also had removed an antibiotic spacer from the tibia and placed infuse BMP2.     Past Medical History:   Diagnosis Date    Hypertension     Pulmonary embolism     2016     Past Surgical History:   Procedure Laterality Date    ANKLE FRACTURE SURGERY      CLOSED REDUCTION OF INJURY OF TIBIA Right 9/18/2018    Procedure: CLOSED REDUCTION, TIBIA;  Surgeon: Bharathi Shearer MD;  Location: [address];  Service: Orthopedics;  Laterality: Right;    CLOSED REDUCTION, TIBIA Right 9/18/2018    Performed by Bharathi Shearer MD at [facility] OR [address] [place] [address]    ELBOW SURGERY      left elbow    FIBULA FRACTURE SURGERY Left     GALLBLADDER SURGERY N/A 1991    REMOVAL OF FOREIGN BODY FROM LOWER EXTREMITY Right 9/18/2018    Procedure: REMOVAL, FOREIGN BODY, LOWER EXTREMITY;  Surgeon: Bharathi Shearer MD;  Location: [address];  Service: Orthopedics;  Laterality: Right;    REMOVAL, FOREIGN BODY, LOWER EXTREMITY Right 9/18/2018    Performed by Bharathi Shearer MD at [facility] OR [address] [place] [address]    REVISION OF PROCEDURE INVOLVING EXTERNAL FIXATION DEVICE Right 9/18/2018    Procedure: REVISION, OF EXTERNAL FIXATION;  Surgeon: Bharathi Shearer MD;  Location: [address];  Service: Orthopedics;  Laterality: Right;    REVISION, OF EXTERNAL FIXATION Right 9/18/2018    Performed by Bharathi Shearer MD at [facility] OR [address] [place] [address]     Social History     Occupational History    Not on  "file   Tobacco Use    Smoking status: Current Every Day Smoker     Packs/day: 0.50     Types: Cigarettes    Smokeless tobacco: Never Used   Substance and Sexual Activity    Alcohol use: Yes     Comment: ocassionallly    Drug use: No    Sexual activity: Not on file      ROS  Current Outpatient Medications on File Prior to Visit   Medication Sig Dispense Refill    amLODIPine (NORVASC) 10 MG tablet Take 10 mg by mouth once daily.       ELIQUIS 5 mg Tab Take 5 mg by mouth 2 (two) times daily.       losartan (COZAAR) 50 MG tablet       medical supply, miscellaneous (MISCELLANEOUS MEDICAL SUPPLY MISC) Rolling walker      teriparatide (FORTEO) 20 mcg/dose - 600 mcg/2.4 mL PnIj Inject 0.08 mLs (20 mcg total) into the skin once daily. 28.8 mL 0     No current facility-administered medications on file prior to visit.      Review of patient's allergies indicates:  No Known Allergies      Objective:      Vitals:    12/05/18 0853   BP: 118/72   Pulse: 107   Resp: 18   Temp: 98.2 °F (36.8 °C)   TempSrc: Oral   Weight: 72.6 kg (160 lb 0.9 oz)   Height: 5' 5" (1.651 m)     Ortho Exam     Gen: WD, WN in NAD   HEENT: NC/AT   Heart: RR   Resp: unlabored breathing   Skin: intact, no lesions pertinent to the surgery site    Assessment:       1. Trauma    2. Type I or II open displaced pilon fracture of left tibia with routine healing, subsequent encounter          Plan:       Surgical intervention per .       "

## 2018-12-13 ENCOUNTER — ANESTHESIA EVENT (OUTPATIENT)
Dept: SURGERY | Facility: HOSPITAL | Age: 53
End: 2018-12-13
Payer: MEDICARE

## 2018-12-13 ENCOUNTER — TELEPHONE (OUTPATIENT)
Dept: ORTHOPEDICS | Facility: CLINIC | Age: 53
End: 2018-12-13

## 2018-12-13 NOTE — PRE-PROCEDURE INSTRUCTIONS
Celsa Nelson RN sent to Bharathi Shearer MD; P Janki DHILLON Staff             You are doing surgery on 12/14 on the above pt. He was not given instructions to stop his ELIQUIS 3 DAYS PRIOR TO HIS SURGERY. He did not take today. Will you be able to do surgery with him being off 48 hrs        Anesthesia review complete, medication instructions given NPO past midnight, Bathe with hibiclens or dial soap the night before & am of surgery. Put nothing on skin -  lotion, deodorant, powder  No metal or jewelry & no valuables pt verbalized understanding and all questions answered    12/14 10:15 am  Just spoke to Mr Lenin PCP DR Chemo Shanks.([phone]) Her recommendation are to hold eliquis for 3 days. Had a PE in the past. His H & H in 11/2018 was 9.9 & 30.5. Platelet 267.   Has  Hep C. She will be sending me a note today with these recommendation  Note placed in media

## 2018-12-13 NOTE — TELEPHONE ENCOUNTER
Spoke with pt.  Advised NPO after midnight.  Arrival time of 10 am tomorrow.  Pt verbalized understanding

## 2018-12-14 ENCOUNTER — ANESTHESIA (OUTPATIENT)
Dept: SURGERY | Facility: HOSPITAL | Age: 53
End: 2018-12-14
Payer: MEDICARE

## 2018-12-14 ENCOUNTER — HOSPITAL ENCOUNTER (OUTPATIENT)
Facility: HOSPITAL | Age: 53
Discharge: HOME | End: 2018-12-14
Attending: ORTHOPAEDIC SURGERY | Admitting: ORTHOPAEDIC SURGERY
Payer: MEDICARE

## 2018-12-14 VITALS
RESPIRATION RATE: 17 BRPM | OXYGEN SATURATION: 100 % | HEIGHT: 65 IN | DIASTOLIC BLOOD PRESSURE: 90 MMHG | WEIGHT: 170 LBS | SYSTOLIC BLOOD PRESSURE: 154 MMHG | BODY MASS INDEX: 28.32 KG/M2 | TEMPERATURE: 98 F | HEART RATE: 56 BPM

## 2018-12-14 DIAGNOSIS — T14.90XA TRAUMA: ICD-10-CM

## 2018-12-14 DIAGNOSIS — S82.872E TYPE I OR II OPEN DISPLACED PILON FRACTURE OF LEFT TIBIA WITH ROUTINE HEALING, SUBSEQUENT ENCOUNTER: ICD-10-CM

## 2018-12-14 PROCEDURE — 37000008 HC ANESTHESIA 1ST 15 MINUTES: Performed by: ORTHOPAEDIC SURGERY

## 2018-12-14 PROCEDURE — 25000003 PHARM REV CODE 250: Performed by: NURSE ANESTHETIST, CERTIFIED REGISTERED

## 2018-12-14 PROCEDURE — 63600175 PHARM REV CODE 636 W HCPCS: Performed by: ANESTHESIOLOGY

## 2018-12-14 PROCEDURE — 64447 NJX AA&/STRD FEMORAL NRV IMG: CPT | Performed by: ANESTHESIOLOGY

## 2018-12-14 PROCEDURE — 37000009 HC ANESTHESIA EA ADD 15 MINS: Performed by: ORTHOPAEDIC SURGERY

## 2018-12-14 PROCEDURE — 63600175 PHARM REV CODE 636 W HCPCS: Performed by: STUDENT IN AN ORGANIZED HEALTH CARE EDUCATION/TRAINING PROGRAM

## 2018-12-14 PROCEDURE — 71000016 HC POSTOP RECOV ADDL HR: Performed by: ORTHOPAEDIC SURGERY

## 2018-12-14 PROCEDURE — 64445 NJX AA&/STRD SCIATIC NRV IMG: CPT | Mod: 59,LT,, | Performed by: ANESTHESIOLOGY

## 2018-12-14 PROCEDURE — 71000044 HC DOSC ROUTINE RECOVERY FIRST HOUR: Performed by: ORTHOPAEDIC SURGERY

## 2018-12-14 PROCEDURE — 76942 ECHO GUIDE FOR BIOPSY: CPT | Mod: 59 | Performed by: ANESTHESIOLOGY

## 2018-12-14 PROCEDURE — 25000003 PHARM REV CODE 250: Performed by: STUDENT IN AN ORGANIZED HEALTH CARE EDUCATION/TRAINING PROGRAM

## 2018-12-14 PROCEDURE — 71000015 HC POSTOP RECOV 1ST HR: Performed by: ORTHOPAEDIC SURGERY

## 2018-12-14 PROCEDURE — 76942 ECHO GUIDE FOR BIOPSY: CPT | Mod: 26,,, | Performed by: ANESTHESIOLOGY

## 2018-12-14 PROCEDURE — 36000707: Performed by: ORTHOPAEDIC SURGERY

## 2018-12-14 PROCEDURE — 01390 ANES CLS PX UPR TIB FIB&/PAT: CPT | Performed by: ORTHOPAEDIC SURGERY

## 2018-12-14 PROCEDURE — 25000003 PHARM REV CODE 250: Performed by: PHYSICIAN ASSISTANT

## 2018-12-14 PROCEDURE — D9220A PRA ANESTHESIA: Mod: CRNA,,, | Performed by: NURSE ANESTHETIST, CERTIFIED REGISTERED

## 2018-12-14 PROCEDURE — D9220A PRA ANESTHESIA: Mod: ANES,,, | Performed by: ANESTHESIOLOGY

## 2018-12-14 PROCEDURE — 36000706: Performed by: ORTHOPAEDIC SURGERY

## 2018-12-14 PROCEDURE — 63600175 PHARM REV CODE 636 W HCPCS: Performed by: PHYSICIAN ASSISTANT

## 2018-12-14 PROCEDURE — 25000003 PHARM REV CODE 250: Performed by: ANESTHESIOLOGY

## 2018-12-14 PROCEDURE — 20694 RMVL EXT FIXJ SYS UNDER ANES: CPT | Mod: 58,,, | Performed by: ORTHOPAEDIC SURGERY

## 2018-12-14 PROCEDURE — 63600175 PHARM REV CODE 636 W HCPCS: Performed by: NURSE ANESTHETIST, CERTIFIED REGISTERED

## 2018-12-14 RX ORDER — CEFAZOLIN SODIUM 1 G/3ML
2 INJECTION, POWDER, FOR SOLUTION INTRAMUSCULAR; INTRAVENOUS
Status: COMPLETED | OUTPATIENT
Start: 2018-12-14 | End: 2018-12-14

## 2018-12-14 RX ORDER — FENTANYL CITRATE 50 UG/ML
25 INJECTION, SOLUTION INTRAMUSCULAR; INTRAVENOUS EVERY 5 MIN PRN
Status: DISCONTINUED | OUTPATIENT
Start: 2018-12-14 | End: 2018-12-14 | Stop reason: HOSPADM

## 2018-12-14 RX ORDER — OXYCODONE AND ACETAMINOPHEN 10; 325 MG/1; MG/1
1 TABLET ORAL EVERY 6 HOURS PRN
Status: DISCONTINUED | OUTPATIENT
Start: 2018-12-14 | End: 2018-12-14 | Stop reason: HOSPADM

## 2018-12-14 RX ORDER — PROPOFOL 10 MG/ML
VIAL (ML) INTRAVENOUS CONTINUOUS PRN
Status: DISCONTINUED | OUTPATIENT
Start: 2018-12-14 | End: 2018-12-14

## 2018-12-14 RX ORDER — LIDOCAINE HCL/PF 100 MG/5ML
SYRINGE (ML) INTRAVENOUS
Status: DISCONTINUED | OUTPATIENT
Start: 2018-12-14 | End: 2018-12-14

## 2018-12-14 RX ORDER — DEXAMETHASONE SODIUM PHOSPHATE 4 MG/ML
INJECTION, SOLUTION INTRA-ARTICULAR; INTRALESIONAL; INTRAMUSCULAR; INTRAVENOUS; SOFT TISSUE
Status: DISCONTINUED | OUTPATIENT
Start: 2018-12-14 | End: 2018-12-14

## 2018-12-14 RX ORDER — SODIUM CHLORIDE 9 MG/ML
INJECTION, SOLUTION INTRAVENOUS CONTINUOUS
Status: DISCONTINUED | OUTPATIENT
Start: 2018-12-14 | End: 2018-12-14 | Stop reason: HOSPADM

## 2018-12-14 RX ORDER — SODIUM CHLORIDE 0.9 % (FLUSH) 0.9 %
3 SYRINGE (ML) INJECTION
Status: DISCONTINUED | OUTPATIENT
Start: 2018-12-14 | End: 2018-12-14 | Stop reason: HOSPADM

## 2018-12-14 RX ORDER — SULFAMETHOXAZOLE AND TRIMETHOPRIM 800; 160 MG/1; MG/1
1 TABLET ORAL 2 TIMES DAILY
Qty: 10 TABLET | Refills: 0 | Status: SHIPPED | OUTPATIENT
Start: 2018-12-14 | End: 2018-12-19

## 2018-12-14 RX ORDER — HYDROMORPHONE HYDROCHLORIDE 1 MG/ML
0.2 INJECTION, SOLUTION INTRAMUSCULAR; INTRAVENOUS; SUBCUTANEOUS EVERY 5 MIN PRN
Status: DISCONTINUED | OUTPATIENT
Start: 2018-12-14 | End: 2018-12-14 | Stop reason: HOSPADM

## 2018-12-14 RX ORDER — BUPIVACAINE HYDROCHLORIDE AND EPINEPHRINE 5; 5 MG/ML; UG/ML
INJECTION, SOLUTION EPIDURAL; INTRACAUDAL; PERINEURAL
Status: COMPLETED | OUTPATIENT
Start: 2018-12-14 | End: 2018-12-14

## 2018-12-14 RX ORDER — GLYCOPYRROLATE 0.2 MG/ML
INJECTION INTRAMUSCULAR; INTRAVENOUS
Status: DISCONTINUED | OUTPATIENT
Start: 2018-12-14 | End: 2018-12-14

## 2018-12-14 RX ORDER — OXYCODONE AND ACETAMINOPHEN 10; 325 MG/1; MG/1
1 TABLET ORAL EVERY 4 HOURS PRN
Qty: 30 TABLET | Refills: 0 | Status: SHIPPED | OUTPATIENT
Start: 2018-12-14 | End: 2018-12-21 | Stop reason: SDUPTHER

## 2018-12-14 RX ORDER — KETAMINE HYDROCHLORIDE 10 MG/ML
INJECTION, SOLUTION INTRAMUSCULAR; INTRAVENOUS
Status: DISCONTINUED | OUTPATIENT
Start: 2018-12-14 | End: 2018-12-14

## 2018-12-14 RX ORDER — SULFAMETHOXAZOLE AND TRIMETHOPRIM 800; 160 MG/1; MG/1
1 TABLET ORAL 2 TIMES DAILY
Qty: 10 TABLET | Refills: 0 | Status: SHIPPED | OUTPATIENT
Start: 2018-12-14 | End: 2018-12-14 | Stop reason: SDUPTHER

## 2018-12-14 RX ORDER — MIDAZOLAM HYDROCHLORIDE 1 MG/ML
0.5 INJECTION INTRAMUSCULAR; INTRAVENOUS
Status: DISCONTINUED | OUTPATIENT
Start: 2018-12-14 | End: 2018-12-14 | Stop reason: HOSPADM

## 2018-12-14 RX ORDER — MUPIROCIN 20 MG/G
OINTMENT TOPICAL
Status: DISCONTINUED | OUTPATIENT
Start: 2018-12-14 | End: 2018-12-14 | Stop reason: HOSPADM

## 2018-12-14 RX ORDER — OXYCODONE AND ACETAMINOPHEN 10; 325 MG/1; MG/1
1 TABLET ORAL EVERY 4 HOURS PRN
Qty: 30 TABLET | Refills: 0 | Status: SHIPPED | OUTPATIENT
Start: 2018-12-14 | End: 2018-12-14 | Stop reason: SDUPTHER

## 2018-12-14 RX ORDER — PROPOFOL 10 MG/ML
VIAL (ML) INTRAVENOUS
Status: DISCONTINUED | OUTPATIENT
Start: 2018-12-14 | End: 2018-12-14

## 2018-12-14 RX ORDER — OXYCODONE AND ACETAMINOPHEN 10; 325 MG/1; MG/1
TABLET ORAL
Status: DISCONTINUED
Start: 2018-12-14 | End: 2018-12-14 | Stop reason: HOSPADM

## 2018-12-14 RX ADMIN — CEFAZOLIN 2 G: 330 INJECTION, POWDER, FOR SOLUTION INTRAMUSCULAR; INTRAVENOUS at 11:12

## 2018-12-14 RX ADMIN — MIDAZOLAM HYDROCHLORIDE 2 MG: 1 INJECTION, SOLUTION INTRAMUSCULAR; INTRAVENOUS at 11:12

## 2018-12-14 RX ADMIN — GLYCOPYRROLATE 0.2 MG: 0.2 INJECTION, SOLUTION INTRAMUSCULAR; INTRAVENOUS at 11:12

## 2018-12-14 RX ADMIN — PROPOFOL 150 MCG/KG/MIN: 10 INJECTION, EMULSION INTRAVENOUS at 11:12

## 2018-12-14 RX ADMIN — SODIUM CHLORIDE 1000 ML: 0.9 INJECTION, SOLUTION INTRAVENOUS at 11:12

## 2018-12-14 RX ADMIN — KETAMINE HYDROCHLORIDE 25 MG: 10 INJECTION, SOLUTION INTRAMUSCULAR; INTRAVENOUS at 12:12

## 2018-12-14 RX ADMIN — PROPOFOL 60 MG: 10 INJECTION, EMULSION INTRAVENOUS at 11:12

## 2018-12-14 RX ADMIN — LIDOCAINE HYDROCHLORIDE 60 MG: 20 INJECTION, SOLUTION INTRAVENOUS at 11:12

## 2018-12-14 RX ADMIN — Medication 0.2 MG: at 12:12

## 2018-12-14 RX ADMIN — BUPIVACAINE HYDROCHLORIDE AND EPINEPHRINE BITARTRATE 30 ML: 5; .0091 INJECTION, SOLUTION EPIDURAL; INTRACAUDAL; PERINEURAL at 12:12

## 2018-12-14 RX ADMIN — OXYCODONE HYDROCHLORIDE AND ACETAMINOPHEN 1 TABLET: 10; 325 TABLET ORAL at 02:12

## 2018-12-14 RX ADMIN — PROPOFOL 30 MG: 10 INJECTION, EMULSION INTRAVENOUS at 12:12

## 2018-12-14 RX ADMIN — SODIUM CHLORIDE, SODIUM GLUCONATE, SODIUM ACETATE, POTASSIUM CHLORIDE, MAGNESIUM CHLORIDE, SODIUM PHOSPHATE, DIBASIC, AND POTASSIUM PHOSPHATE: .53; .5; .37; .037; .03; .012; .00082 INJECTION, SOLUTION INTRAVENOUS at 12:12

## 2018-12-14 RX ADMIN — BUPIVACAINE HYDROCHLORIDE AND EPINEPHRINE BITARTRATE 10 ML: 5; .0091 INJECTION, SOLUTION EPIDURAL; INTRACAUDAL; PERINEURAL at 12:12

## 2018-12-14 RX ADMIN — DEXAMETHASONE SODIUM PHOSPHATE 8 MG: 4 INJECTION, SOLUTION INTRAMUSCULAR; INTRAVENOUS at 11:12

## 2018-12-14 RX ADMIN — FENTANYL CITRATE 100 MCG: 50 INJECTION INTRAMUSCULAR; INTRAVENOUS at 11:12

## 2018-12-14 RX ADMIN — MUPIROCIN: 20 OINTMENT TOPICAL at 11:12

## 2018-12-14 NOTE — TRANSFER OF CARE
"Anesthesia Transfer of Care Note    Patient: Elias Phelps    Procedure(s) Performed: Procedure(s) (LRB):  REMOVAL, EXTERNAL FIXATION DEVICE (Left)    Patient location: [facility]    Anesthesia Type: general    Transport from OR: Transported from OR on room air with adequate spontaneous ventilation    Post pain: adequate analgesia    Post assessment: no apparent anesthetic complications and tolerated procedure well    Post vital signs: stable    Level of consciousness: awake, alert and oriented    Nausea/Vomiting: no nausea/vomiting    Complications: none    Transfer of care protocol was followed      Last vitals:   Visit Vitals  /82   Pulse 81   Temp 37.1 °C (98.8 °F) (Oral)   Resp 18   Ht 5' 5" (1.651 m)   Wt 77.1 kg (170 lb)   SpO2 98%   BMI 28.29 kg/m²     "

## 2018-12-14 NOTE — OP NOTE
DATE OF PROCEDURE:  12/14/2018    PREOPERATIVE DIAGNOSIS:  Open left distal tibial shaft fracture with fibula,   status post spanning external fixation and fixation of fibula.    POSTOPERATIVE DIAGNOSIS:  Open left distal tibial shaft fracture with fibula,   status post spanning external fixation and fixation of fibula.    PROCEDURES PERFORMED:  1.  Removal of external fixation of the left leg under anesthesia.  2.  Stress exam under fluoroscopy and anesthesia, left distal tibia fracture.    SURGEON:  Bharathi Shearer M.D.    ASSISTANT:  Bonilla Pace M.D. (RES)    ANESTHESIA:  Monitored anesthesia care plus regional.    ESTIMATED BLOOD LOSS:  None.    IV FLUIDS:  500 mL of crystalloid.    INDICATIONS FOR PROCEDURE:  The patient is a 53-year-old male who sustained a   grade 2 open left distal tibia and fibula fractures.  He was treated initially   at an outside hospital with open reduction and internal fixation of the fibula   and spanning external fixation of the tibia.  The patient came to me about a   month after that injury.  At that point, he was taken back to the operating room   and revised his external fixator with plans for either revising that to a nail   or definitive external fixation.  We went to definitive external fixation option   given his time since the original external fixation and open fracture.  He is   now about 12 weeks post fixation, has returned to the operating room for removal   of external fixator.  The risks, benefits and alternatives of surgery were   discussed at length with the patient prior to going to the operating room.    Informed consent was obtained.    PROCEDURE IN DETAIL:  The patient was identified in the preoperative holding   area and the site was marked.  General anesthesia was performed.  The patient   was wheeled in the operating room and placed on the operating table in supine   position.  Monitored anesthesia care was induced.  Preoperative antibiotics were    administered.  Timeout was undertaken to confirm patient, site, side, surgery,   surgeon and administration of preoperative antibiotics.  All agreed and we   proceeded.    I removed the bars and clamps from the external fixator.  At this point, under   Fluoroscopy, I stressed it in varus, valgus, procurvatum or recurvatum and the   fracture appeared to be fairly stable.  At this point, I removed the pins from   the metatarsals from the tibia and then removed the centrally threaded calcaneal   pin while passing this through alcohol wipe.  At this point, I scrubbed down   all the hole sites and then covered them with dressings.  I then placed him in a   short-leg posterior splint with stirrups.    All instrument and sponge counts reported correct at the end of the case.  There   were no complications.  The patient was awakened and taken to the recovery room   in stable condition.    PLAN FOR THE PATIENT:  I will leave him in the splint for at least a week, let   his holes in the bone for an ex-fix.  If it starts to heal, then will probably   place him in a plantar fasciitis boot and allow him gentle range of motion.  I   will keep him nonweightbearing likely for at least another 4 to 6 weeks and we   will have to look at how his bone heals and maybe discuss posterolateral bone   graft if it does not fill in the large defect.      SHANDA  dd: 12/14/2018 12:38:21 (CST)  td: 12/14/2018 15:02:20 (CST)  Doc ID   #[id number]  Job ID #[id number]    CC:

## 2018-12-14 NOTE — ANESTHESIA PROCEDURE NOTES
Popliteal Sciatic Single Injection Block    Patient location during procedure: pre-op   Block not for primary anesthetic.  Reason for block: at surgeon's request and post-op pain management   Post-op Pain Location: L leg pain  Start time: 12/14/2018 11:27 AM  Timeout: 12/14/2018 11:27 AM   End time: 12/14/2018 11:32 AM  Staffing  Anesthesiologist: Kenny García MD  Resident/CRNA: Tee Michael MD  Performed: resident/CRNA   Preanesthetic Checklist  Completed: patient identified, site marked, surgical consent, pre-op evaluation, timeout performed, IV checked, risks and benefits discussed and monitors and equipment checked  Peripheral Block  Patient position: supine  Prep: ChloraPrep  Patient monitoring: heart rate, cardiac monitor, continuous pulse ox, continuous capnometry and frequent blood pressure checks  Block type: popliteal  Laterality: left  Injection technique: single shot  Needle  Needle type: Stimuplex   Needle gauge: 21 G  Needle length: 4 in  Needle localization: anatomical landmarks and ultrasound guidance   -ultrasound image captured on disc.  Assessment  Injection assessment: negative aspiration, negative parasthesia and local visualized surrounding nerve  Paresthesia pain: none  Heart rate change: no  Slow fractionated injection: yes  Additional Notes  VSS.  [facility] RN monitoring vitals throughout procedure.  Patient tolerated procedure well.

## 2018-12-14 NOTE — ANESTHESIA POSTPROCEDURE EVALUATION
"Anesthesia Post Evaluation    Patient: Elias Phelps    Procedure(s) Performed: Procedure(s) (LRB):  REMOVAL, EXTERNAL FIXATION DEVICE (Left)    Final Anesthesia Type: general  Patient location during evaluation: PACU  Patient participation: Yes- Able to Participate  Level of consciousness: awake and alert and oriented  Post-procedure vital signs: reviewed and stable  Pain management: adequate  Airway patency: patent  PONV status at discharge: No PONV  Anesthetic complications: no      Cardiovascular status: blood pressure returned to baseline  Respiratory status: unassisted  Hydration status: euvolemic  Follow-up not needed.        Visit Vitals  BP (!) 154/90   Pulse (!) 56   Temp 36.7 °C (98 °F) (Temporal)   Resp 17   Ht 5' 5" (1.651 m)   Wt 77.1 kg (170 lb)   SpO2 100%   BMI 28.29 kg/m²       Pain/Kenny Score: Pain Rating Prior to Med Admin: 6 (12/14/2018  3:18 PM)  Pain Rating Post Med Admin: 4 (12/14/2018  3:18 PM)  Kenny Score: 9 (12/14/2018 12:45 PM)        "

## 2018-12-14 NOTE — ANESTHESIA PREPROCEDURE EVALUATION
12/14/2018  Elias Phelps is a 53 y.o., male.    Fell 8 feet off ladder and broke leg, no LOC or other injuries in august.    Anesthesia type: Choice   Diagnosis: Type I or II open displaced pilon fracture of left tibia with routine healing, subsequent encounter [T72.595E]       Pre-operative evaluation for Procedure(s) (LRB):  REMOVAL, EXTERNAL FIXATION DEVICE (Left)    Review of patient's allergies indicates:  No Known Allergies    No current facility-administered medications on file prior to encounter.      Current Outpatient Medications on File Prior to Encounter   Medication Sig Dispense Refill    amLODIPine (NORVASC) 10 MG tablet Take 10 mg by mouth once daily.       losartan (COZAAR) 50 MG tablet Take 50 mg by mouth once daily.       oxyCODONE-acetaminophen (PERCOCET)  mg per tablet Take 1 tablet by mouth every 4 to 6 hours as needed for Pain. 42 tablet 0    teriparatide (FORTEO) 20 mcg/dose - 600 mcg/2.4 mL PnIj Inject 0.08 mLs (20 mcg total) into the skin once daily. 28.8 mL 0    ELIQUIS 5 mg Tab Take 5 mg by mouth 2 (two) times daily.       medical supply, miscellaneous (MISCELLANEOUS MEDICAL SUPPLY MISC) Rolling walker         Patient Active Problem List   Diagnosis    Open displaced pilon fracture of left tibia    Trauma       Past Surgical History:   Procedure Laterality Date    ANKLE FRACTURE SURGERY      CLOSED REDUCTION OF INJURY OF TIBIA Right 9/18/2018    Procedure: CLOSED REDUCTION, TIBIA;  Surgeon: Bharathi Shearer MD;  Location: [facility] OR [address] [place] [address];  Service: Orthopedics;  Laterality: Right;    CLOSED REDUCTION, TIBIA Right 9/18/2018    Performed by Bharathi Shearer MD at [facility] OR [address] [place] [address]    ELBOW SURGERY      left elbow    FIBULA FRACTURE SURGERY Left     GALLBLADDER SURGERY N/A 1991    REMOVAL OF FOREIGN BODY FROM LOWER EXTREMITY Right 9/18/2018    Procedure:  REMOVAL, FOREIGN BODY, LOWER EXTREMITY;  Surgeon: Bharathi Shearer MD;  Location: [facility] OR [address] [place] [address];  Service: Orthopedics;  Laterality: Right;    REMOVAL, FOREIGN BODY, LOWER EXTREMITY Right 9/18/2018    Performed by Bharathi Shearer MD at [facility] OR [address] [place] [address]    REVISION OF PROCEDURE INVOLVING EXTERNAL FIXATION DEVICE Right 9/18/2018    Procedure: REVISION, OF EXTERNAL FIXATION;  Surgeon: Bharathi Shearer MD;  Location: [facility] OR [address] [place] [address];  Service: Orthopedics;  Laterality: Right;    REVISION, OF EXTERNAL FIXATION Right 9/18/2018    Performed by Bharathi Shearer MD at [facility] OR [address] [place] [address]           No results for input(s): HCT in the last 72 hours.  No results for input(s): PLT in the last 72 hours.  No results for input(s): K in the last 72 hours.  No results for input(s): CREATININE in the last 72 hours.  No results for input(s): GLU in the last 72 hours.  No results for input(s): PT in the last 72 hours.                    Anesthesia Evaluation         Review of Systems  Anesthesia Hx:  No problems with previous Anesthesia    Social:  Alcohol Use, Smoker    Hematology/Oncology:     Oncology Normal   Hematology Comments: Had PE last year. CP and SOB, went to the hospital and anticoagulated.  Last eliquis wednesday    Cardiovascular:   Hypertension, well controlled Denies MI.    Denies Angina. Before injury would ride bike 2 miles.   Pulmonary:  Pulmonary Normal  Denies COPD.  Denies Asthma.  Denies Shortness of breath.    Renal/:   Denies Chronic Renal Disease.     Hepatic/GI:   Denies Liver Disease.    Neurological:   Denies TIA. Denies CVA. Denies Seizures.    Endocrine:   Denies Diabetes.        Physical Exam  General:  Well nourished    Airway/Jaw/Neck:  Airway Findings: Mouth Opening: Normal Tongue: Normal  General Airway Assessment: Adult, Average  Mallampati: II  TM Distance: Normal, at least 6 cm  Jaw/Neck Findings:  Neck ROM: Normal ROM       Chest/Lungs:  Chest/Lungs Findings: Clear to auscultation      Heart/Vascular:  Heart Findings: Rate: Normal  Rhythm: Regular Rhythm  Sounds: Normal        Mental Status:  Mental Status Findings:  Cooperative, Alert and Oriented         Anesthesia Plan  Type of Anesthesia, risks & benefits discussed:  Anesthesia Type:  general  Patient's Preference:   Intra-op Monitoring Plan:   Intra-op Monitoring Plan Comments:   Post Op Pain Control Plan:   Post Op Pain Control Plan Comments: As per surgeon's plan  Induction:   IV  Beta Blocker:  Patient is not currently on a Beta-Blocker (No further documentation required).       Informed Consent: Patient understands risks and agrees with Anesthesia plan.  Questions answered. Anesthesia consent signed with patient.  ASA Score: 2     Day of Surgery Review of History & Physical:    H&P update referred to the surgeon.         Ready For Surgery From Anesthesia Perspective.

## 2018-12-14 NOTE — INTERVAL H&P NOTE
The patient has been examined and the H&P has been reviewed:    I concur with the findings and no changes have occurred since H&P was written.    Anesthesia/Surgery risks, benefits and alternative options discussed and understood by patient/family.          Active Hospital Problems    Diagnosis  POA    Trauma [T14.90XA]  Yes      Resolved Hospital Problems   No resolved problems to display.

## 2018-12-14 NOTE — ANESTHESIA PROCEDURE NOTES
Adductor Canal Single Injection Block    Patient location during procedure: pre-op   Block not for primary anesthetic.  Reason for block: at surgeon's request and post-op pain management   Post-op Pain Location: L leg pain  Start time: 12/14/2018 11:33 AM  Timeout: 12/14/2018 11:27 AM   End time: 12/14/2018 11:36 AM  Staffing  Anesthesiologist: Kenny García MD  Resident/CRNA: Tee Michael MD  Performed: resident/CRNA   Preanesthetic Checklist  Completed: patient identified, site marked, surgical consent, pre-op evaluation, timeout performed, IV checked, risks and benefits discussed and monitors and equipment checked  Peripheral Block  Patient position: supine  Prep: ChloraPrep  Patient monitoring: heart rate, cardiac monitor, continuous pulse ox, continuous capnometry and frequent blood pressure checks  Block type: adductor canal  Laterality: left  Injection technique: single shot  Needle  Needle type: Stimuplex   Needle gauge: 21 G  Needle length: 4 in  Needle localization: anatomical landmarks and ultrasound guidance   -ultrasound image captured on disc.  Assessment  Injection assessment: negative aspiration, negative parasthesia and local visualized surrounding nerve  Paresthesia pain: none  Heart rate change: no  Slow fractionated injection: yes  Additional Notes  VSS.  [facility] RN monitoring vitals throughout procedure.  Patient tolerated procedure well.

## 2018-12-14 NOTE — DISCHARGE INSTRUCTIONS
Understanding Peripheral Nerve Blocks (PNBs)  Regional anesthesia is medicine that numbs a section of your body. Peripheral nerve blocks (PNBs) are a type of regional anesthesia. To do the block, a healthcare provider injects numbing medicine into a certain nerve or bundle of nerves. The area below the nerves is then numbed for a time.  Why PNBs are done  PNBs are most often used to prevent pain during surgery and for a time afterward. They can be used for your arms, hands, legs, or feet. They may also be used for your neck, face, or groin. PNBs provide pain relief that lasts longer than local anesthesia. They can also be used to numb smaller areas of the body than other types of regional anesthesia.  How PNBs are done  · An IV (intravenous) line may be put into a vein in your arm or hand. This line provides fluids and medicines.  · Medical staff closely watches your blood pressure, breathing, and heart rate during the procedure.  · You may be given medicine to help you relax and make you sleepy.  · The healthcare provider identifies the nerves to be numbed. He or she may do this with the help of ultrasound or nerve stimulation. The injection site is chosen.  · The provider inserts a needle with the medicine (anesthetic) at the injection site. He or she injects the medicine.  · The targeted part of your body becomes numb within 10 to 30 minutes. The area stays numb throughout the procedure.  After the procedure is done, the numbness slowly wears off over the next 6 to 30 hours, depending on the type of medicine used.  Risks of PNBs  · Bruising at the injection site  · Nerve injury  · Reaction to the anesthetic  · Injury to the numbed area of the body  Date Last Reviewed: 6/1/2016  © 6621-2134 [employer] [address] [place] [address], [place] [address]. All rights reserved. This information is not intended as a substitute for professional medical care. Always follow your healthcare professional's  instructions.        Discharge Instructions: After Your Surgery  Youve just had surgery. During surgery, you were given medicine called anesthesia to keep you relaxed and free of pain. After surgery, you may have some pain or nausea. This is common. Here are some tips for feeling better and getting well after surgery.     Stay on schedule with your medicine.   Going home  Your healthcare provider will show you how to take care of yourself when you go home. He or she will also answer your questions. Have an adult family member or friend drive you home. For the first 24 hours after your surgery:  · Do not drive or use heavy equipment.  · Do not make important decisions or sign legal papers.  · Do not drink alcohol.  · Have someone stay with you, if needed. He or she can watch for problems and help keep you safe.  Be sure to go to all follow-up visits with your healthcare provider. And rest after your surgery for as long as your healthcare provider tells you to.  Coping with pain  If you have pain after surgery, pain medicine will help you feel better. Take it as told, before pain becomes severe. Also, ask your healthcare provider or pharmacist about other ways to control pain. This might be with heat, ice, or relaxation. And follow any other instructions your surgeon or nurse gives you.  Tips for taking pain medicine  To get the best relief possible, remember these points:  · Pain medicines can upset your stomach. Taking them with a little food may help.  · Most pain relievers taken by mouth need at least 20 to 30 minutes to start to work.  · Taking medicine on a schedule can help you remember to take it. Try to time your medicine so that you can take it before starting an activity. This might be before you get dressed, go for a walk, or sit down for dinner.  · Constipation is a common side effect of pain medicines. Call your healthcare provider before taking any medicines such as laxatives or stool softeners to help  ease constipation. Also ask if you should skip any foods. Drinking lots of fluids and eating foods such as fruits and vegetables that are high in fiber can also help. Remember, do not take laxatives unless your surgeon has prescribed them.  · Drinking alcohol and taking pain medicine can cause dizziness and slow your breathing. It can even be deadly. Do not drink alcohol while taking pain medicine.  · Pain medicine can make you react more slowly to things. Do not drive or run machinery while taking pain medicine.  Your healthcare provider may tell you to take acetaminophen to help ease your pain. Ask him or her how much you are supposed to take each day. Acetaminophen or other pain relievers may interact with your prescription medicines or other over-the-counter (OTC) medicines. Some prescription medicines have acetaminophen and other ingredients. Using both prescription and OTC acetaminophen for pain can cause you to overdose. Read the labels on your OTC medicines with care. This will help you to clearly know the list of ingredients, how much to take, and any warnings. It may also help you not take too much acetaminophen. If you have questions or do not understand the information, ask your pharmacist or healthcare provider to explain it to you before you take the OTC medicine.  Managing nausea  Some people have an upset stomach after surgery. This is often because of anesthesia, pain, or pain medicine, or the stress of surgery. These tips will help you handle nausea and eat healthy foods as you get better. If you were on a special food plan before surgery, ask your healthcare provider if you should follow it while you get better. These tips may help:  · Do not push yourself to eat. Your body will tell you when to eat and how much.  · Start off with clear liquids and soup. They are easier to digest.  · Next try semi-solid foods, such as mashed potatoes, applesauce, and gelatin, as you feel ready.  · Slowly move to  solid foods. Dont eat fatty, rich, or spicy foods at first.  · Do not force yourself to have 3 large meals a day. Instead eat smaller amounts more often.  · Take pain medicines with a small amount of solid food, such as crackers or toast, to avoid nausea.     Call your surgeon if  · You still have pain an hour after taking medicine. The medicine may not be strong enough.  · You feel too sleepy, dizzy, or groggy. The medicine may be too strong.  · You have side effects like nausea, vomiting, or skin changes, such as rash, itching, or hives.       If you have obstructive sleep apnea  You were given anesthesia medicine during surgery to keep you comfortable and free of pain. After surgery, you may have more apnea spells because of this medicine and other medicines you were given. The spells may last longer than usual.   At home:  · Keep using the continuous positive airway pressure (CPAP) device when you sleep. Unless your healthcare provider tells you not to, use it when you sleep, day or night. CPAP is a common device used to treat obstructive sleep apnea.  · Talk with your provider before taking any pain medicine, muscle relaxants, or sedatives. Your provider will tell you about the possible dangers of taking these medicines.  Date Last Reviewed: 12/1/2016  © 5105-3520 [employer]. [address] [place] [address], [place] [address]. All rights reserved. This information is not intended as a substitute for professional medical care. Always follow your healthcare professional's instructions.

## 2018-12-14 NOTE — BRIEF OP NOTE
BRIEF OP NOTE    Preop Dx: Open left distal tibial shaft fracture with fibula status post spanning external fixation and fixation of fibula    Postop Dx: Open left distal tibial shaft fracture with fibula status post spanning external fixation and fixation of fibula    Procedure: 1.  Removal of external fixation left leg under anesthesia    2.  Stress exam under fluoroscopy and anesthesia left distal tibial fracture    Surgeon: Bharathi Shearer M.D.    Asst:  Bonilla Pace M.D    Anesthesia: MAC plus regional    EBL:  None    IVF:  500cc crystalloid    Specimens: None    Findings: Stable to basic stress    Dispo:  To [facility] awake/stable     Dict#  [id number]

## 2018-12-21 ENCOUNTER — OFFICE VISIT (OUTPATIENT)
Dept: ORTHOPEDICS | Facility: CLINIC | Age: 53
End: 2018-12-21
Payer: MEDICARE

## 2018-12-21 ENCOUNTER — HOSPITAL ENCOUNTER (OUTPATIENT)
Dept: RADIOLOGY | Facility: HOSPITAL | Age: 53
Discharge: HOME | End: 2018-12-21
Attending: PHYSICIAN ASSISTANT
Payer: MEDICARE

## 2018-12-21 ENCOUNTER — TELEPHONE (OUTPATIENT)
Dept: PHARMACY | Facility: CLINIC | Age: 53
End: 2018-12-21

## 2018-12-21 VITALS
HEART RATE: 84 BPM | BODY MASS INDEX: 28.32 KG/M2 | DIASTOLIC BLOOD PRESSURE: 72 MMHG | TEMPERATURE: 97 F | SYSTOLIC BLOOD PRESSURE: 125 MMHG | HEIGHT: 65 IN | WEIGHT: 170 LBS | RESPIRATION RATE: 18 BRPM

## 2018-12-21 DIAGNOSIS — S82.872E TYPE I OR II OPEN DISPLACED PILON FRACTURE OF LEFT TIBIA WITH ROUTINE HEALING, SUBSEQUENT ENCOUNTER: Primary | ICD-10-CM

## 2018-12-21 DIAGNOSIS — S82.872E TYPE I OR II OPEN DISPLACED PILON FRACTURE OF LEFT TIBIA WITH ROUTINE HEALING, SUBSEQUENT ENCOUNTER: ICD-10-CM

## 2018-12-21 PROCEDURE — 99024 POSTOP FOLLOW-UP VISIT: CPT | Mod: S$GLB,,, | Performed by: PHYSICIAN ASSISTANT

## 2018-12-21 PROCEDURE — 73610 X-RAY EXAM OF ANKLE: CPT | Mod: 26,LT,, | Performed by: RADIOLOGY

## 2018-12-21 PROCEDURE — 29405 APPL SHORT LEG CAST: CPT | Mod: 58,LT,S$GLB, | Performed by: PHYSICIAN ASSISTANT

## 2018-12-21 PROCEDURE — 99999 PR PBB SHADOW E&M-EST. PATIENT-LVL III: CPT | Mod: PBBFAC,,, | Performed by: PHYSICIAN ASSISTANT

## 2018-12-21 PROCEDURE — 73610 X-RAY EXAM OF ANKLE: CPT | Mod: TC,LT

## 2018-12-21 RX ORDER — OXYCODONE AND ACETAMINOPHEN 10; 325 MG/1; MG/1
1 TABLET ORAL EVERY 4 HOURS PRN
Qty: 42 TABLET | Refills: 0 | Status: ON HOLD | OUTPATIENT
Start: 2018-12-21 | End: 2019-01-11 | Stop reason: HOSPADM

## 2018-12-21 RX ORDER — OXYCODONE AND ACETAMINOPHEN 10; 325 MG/1; MG/1
1 TABLET ORAL EVERY 4 HOURS PRN
Qty: 42 TABLET | Refills: 0 | Status: SHIPPED | OUTPATIENT
Start: 2018-12-21 | End: 2018-12-21

## 2018-12-21 NOTE — PROGRESS NOTES
Mr. Phelps is 51 yo male with PM Hx of HTN and PE 2016 had open left tibial pilon fracture with fibula on 8/9/18 treated initially with I&D, fibular plating spanning ex fix of the tibia with limited internal fixation. He was seen on 9/16.  At that point, he'd fallen into a bit of varus with some medial displacement of the tibia.  He was taken to the OR and revised the ex fix with new tibial and calcaneal pins and included the first and fifth metatarsals. At that time he also had removed an antibiotic spacer from the tibia and placed infuse BMP2. He has external fixator removed on 12/14/2018.     Mr. Phelps is here today for a post-operative visit after a   1.  Removal of external fixation of the left leg under anesthesia.  2.  Stress exam under fluoroscopy and anesthesia, left distal tibia fracture.  by Dr. Shearer  on 12/14/2018.    Interval History:    he reports that he is doing ok.  Pain is not controlled.  he is  taking pain medication.    He stated that he stepped on it accidentally 2 days ago and since that time has had a great increase in pain.   he denies fever, chills, and sweats since the time of the surgery.     Physical exam:  Dressing taken down.  Incision is clean, dry and intact.  Sutures removed without difficulty.      RADS: none done today    Assessment:  Post-op visit ( 1 weeks)    Plan:  Current care, treatment plan, precautions, activity level/ modifications, limitations, rehabilitation exercises and proposed future treatment were discussed with the patient. We discussed the need to monitor for changes in symptoms and condition and report them to the physician.  Discussed importance of compliance with all appointments and follow up examinations.   - plantar fasciitis boot.   - NWB 4-6 weeks pending healing.   - range of motion as tolerated  - pain medication: refill needed,    Pain medication refill policy provided to patient for review.   - Patient is to return to clinic in 4 weeks  At time  x-ray of his ankle is needed  At time consider advance weightbearing pending healing.      If there are any questions prior to scheduled follow up, the patient was instructed to contact the office

## 2018-12-27 ENCOUNTER — TELEPHONE (OUTPATIENT)
Dept: PHARMACY | Facility: CLINIC | Age: 53
End: 2018-12-27

## 2019-01-08 ENCOUNTER — TELEPHONE (OUTPATIENT)
Dept: ORTHOPEDICS | Facility: CLINIC | Age: 54
End: 2019-01-08

## 2019-01-08 ENCOUNTER — TELEPHONE (OUTPATIENT)
Dept: PHARMACY | Facility: CLINIC | Age: 54
End: 2019-01-08

## 2019-01-08 NOTE — TELEPHONE ENCOUNTER
Left voice mail for pt to return my call.  Need to reschedule pt's missed post op appointment.  Pending return call.

## 2019-01-09 ENCOUNTER — HOSPITAL ENCOUNTER (INPATIENT)
Facility: HOSPITAL | Age: 54
LOS: 2 days | Discharge: HOME | DRG: 300 | End: 2019-01-11
Attending: EMERGENCY MEDICINE | Admitting: HOSPITALIST
Payer: MEDICARE

## 2019-01-09 ENCOUNTER — TELEPHONE (OUTPATIENT)
Dept: ORTHOPEDICS | Facility: CLINIC | Age: 54
End: 2019-01-09

## 2019-01-09 DIAGNOSIS — G89.18 POST-OP PAIN: ICD-10-CM

## 2019-01-09 DIAGNOSIS — I82.402 ACUTE DEEP VEIN THROMBOSIS (DVT) OF LEFT LOWER EXTREMITY, UNSPECIFIED VEIN: Primary | ICD-10-CM

## 2019-01-09 DIAGNOSIS — M79.89 LEG SWELLING: ICD-10-CM

## 2019-01-09 PROCEDURE — 85730 THROMBOPLASTIN TIME PARTIAL: CPT

## 2019-01-09 PROCEDURE — 85025 COMPLETE CBC W/AUTO DIFF WBC: CPT

## 2019-01-09 PROCEDURE — 80053 COMPREHEN METABOLIC PANEL: CPT

## 2019-01-09 PROCEDURE — 85610 PROTHROMBIN TIME: CPT

## 2019-01-09 PROCEDURE — 99285 EMERGENCY DEPT VISIT HI MDM: CPT | Mod: 25

## 2019-01-09 PROCEDURE — 12000002 HC ACUTE/MED SURGE SEMI-PRIVATE ROOM

## 2019-01-09 PROCEDURE — 85379 FIBRIN DEGRADATION QUANT: CPT

## 2019-01-09 PROCEDURE — 25000003 PHARM REV CODE 250: Performed by: EMERGENCY MEDICINE

## 2019-01-09 RX ORDER — OXYCODONE AND ACETAMINOPHEN 5; 325 MG/1; MG/1
1 TABLET ORAL
Status: COMPLETED | OUTPATIENT
Start: 2019-01-09 | End: 2019-01-09

## 2019-01-09 RX ADMIN — OXYCODONE AND ACETAMINOPHEN 1 TABLET: 5; 325 TABLET ORAL at 11:01

## 2019-01-10 PROBLEM — I10 ESSENTIAL HYPERTENSION: Status: ACTIVE | Noted: 2019-01-10

## 2019-01-10 PROBLEM — I82.402 ACUTE DEEP VEIN THROMBOSIS (DVT) OF LEFT LOWER EXTREMITY: Status: ACTIVE | Noted: 2019-01-10

## 2019-01-10 PROBLEM — Z86.711 HISTORY OF PULMONARY EMBOLISM: Status: ACTIVE | Noted: 2019-01-10

## 2019-01-10 PROBLEM — N17.9 ACUTE RENAL FAILURE: Status: ACTIVE | Noted: 2019-01-10

## 2019-01-10 PROBLEM — F11.10 HEROIN ABUSE: Status: ACTIVE | Noted: 2019-01-10

## 2019-01-10 LAB
ALBUMIN SERPL BCP-MCNC: 2.7 G/DL
ALP SERPL-CCNC: 87 U/L
ALT SERPL W/O P-5'-P-CCNC: 21 U/L
ANION GAP SERPL CALC-SCNC: 14 MMOL/L
APTT BLDCRRT: 26.2 SEC
AST SERPL-CCNC: 20 U/L
BASOPHILS # BLD AUTO: 0.01 K/UL
BASOPHILS NFR BLD: 0.1 %
BILIRUB SERPL-MCNC: 1.1 MG/DL
BUN SERPL-MCNC: 20 MG/DL
CALCIUM SERPL-MCNC: 9.1 MG/DL
CHLORIDE SERPL-SCNC: 104 MMOL/L
CO2 SERPL-SCNC: 22 MMOL/L
CREAT SERPL-MCNC: 1.8 MG/DL
D DIMER PPP IA.FEU-MCNC: 9.11 MG/L FEU
DIFFERENTIAL METHOD: ABNORMAL
EOSINOPHIL # BLD AUTO: 0.2 K/UL
EOSINOPHIL NFR BLD: 1.4 %
ERYTHROCYTE [DISTWIDTH] IN BLOOD BY AUTOMATED COUNT: 13.2 %
EST. GFR  (AFRICAN AMERICAN): 49 ML/MIN/1.73 M^2
EST. GFR  (NON AFRICAN AMERICAN): 42 ML/MIN/1.73 M^2
GLUCOSE SERPL-MCNC: 105 MG/DL
HCT VFR BLD AUTO: 28.8 %
HGB BLD-MCNC: 9.7 G/DL
INR PPP: 1
LYMPHOCYTES # BLD AUTO: 2.1 K/UL
LYMPHOCYTES NFR BLD: 14.7 %
MCH RBC QN AUTO: 29.6 PG
MCHC RBC AUTO-ENTMCNC: 33.7 G/DL
MCV RBC AUTO: 88 FL
MONOCYTES # BLD AUTO: 1.1 K/UL
MONOCYTES NFR BLD: 7.8 %
NEUTROPHILS # BLD AUTO: 10.7 K/UL
NEUTROPHILS NFR BLD: 76 %
PLATELET # BLD AUTO: 303 K/UL
PMV BLD AUTO: 8.8 FL
POTASSIUM SERPL-SCNC: 3.7 MMOL/L
PROT SERPL-MCNC: 7.7 G/DL
PROTHROMBIN TIME: 10.6 SEC
RBC # BLD AUTO: 3.28 M/UL
SODIUM SERPL-SCNC: 140 MMOL/L
WBC # BLD AUTO: 14.06 K/UL

## 2019-01-10 PROCEDURE — 97161 PT EVAL LOW COMPLEX 20 MIN: CPT

## 2019-01-10 PROCEDURE — 97165 OT EVAL LOW COMPLEX 30 MIN: CPT

## 2019-01-10 PROCEDURE — G8980 MOBILITY D/C STATUS: HCPCS | Mod: CJ

## 2019-01-10 PROCEDURE — G8979 MOBILITY GOAL STATUS: HCPCS | Mod: CJ

## 2019-01-10 PROCEDURE — 25000003 PHARM REV CODE 250: Performed by: HOSPITALIST

## 2019-01-10 PROCEDURE — 25000003 PHARM REV CODE 250: Performed by: EMERGENCY MEDICINE

## 2019-01-10 PROCEDURE — G8978 MOBILITY CURRENT STATUS: HCPCS | Mod: CJ

## 2019-01-10 PROCEDURE — 96374 THER/PROPH/DIAG INJ IV PUSH: CPT

## 2019-01-10 PROCEDURE — 63600175 PHARM REV CODE 636 W HCPCS: Performed by: EMERGENCY MEDICINE

## 2019-01-10 PROCEDURE — 63600175 PHARM REV CODE 636 W HCPCS: Performed by: HOSPITALIST

## 2019-01-10 PROCEDURE — 11000001 HC ACUTE MED/SURG PRIVATE ROOM

## 2019-01-10 RX ORDER — IBUPROFEN 600 MG/1
600 TABLET ORAL EVERY 6 HOURS PRN
Status: DISCONTINUED | OUTPATIENT
Start: 2019-01-10 | End: 2019-01-11 | Stop reason: HOSPADM

## 2019-01-10 RX ORDER — SODIUM CHLORIDE 9 MG/ML
INJECTION, SOLUTION INTRAVENOUS CONTINUOUS
Status: DISCONTINUED | OUTPATIENT
Start: 2019-01-10 | End: 2019-01-11 | Stop reason: HOSPADM

## 2019-01-10 RX ORDER — FAMOTIDINE 20 MG/1
20 TABLET, FILM COATED ORAL DAILY
Status: DISCONTINUED | OUTPATIENT
Start: 2019-01-11 | End: 2019-01-11 | Stop reason: HOSPADM

## 2019-01-10 RX ORDER — FAMOTIDINE 20 MG/1
20 TABLET, FILM COATED ORAL 2 TIMES DAILY
Status: DISCONTINUED | OUTPATIENT
Start: 2019-01-10 | End: 2019-01-10

## 2019-01-10 RX ORDER — CYCLOBENZAPRINE HCL 10 MG
10 TABLET ORAL 3 TIMES DAILY PRN
Status: DISCONTINUED | OUTPATIENT
Start: 2019-01-10 | End: 2019-01-11 | Stop reason: HOSPADM

## 2019-01-10 RX ORDER — ENOXAPARIN SODIUM 100 MG/ML
1 INJECTION SUBCUTANEOUS
Status: DISCONTINUED | OUTPATIENT
Start: 2019-01-10 | End: 2019-01-11 | Stop reason: HOSPADM

## 2019-01-10 RX ORDER — AMLODIPINE BESYLATE 5 MG/1
10 TABLET ORAL DAILY
Status: DISCONTINUED | OUTPATIENT
Start: 2019-01-10 | End: 2019-01-11 | Stop reason: HOSPADM

## 2019-01-10 RX ORDER — MORPHINE SULFATE 10 MG/ML
4 INJECTION INTRAMUSCULAR; INTRAVENOUS; SUBCUTANEOUS EVERY 4 HOURS PRN
Status: DISCONTINUED | OUTPATIENT
Start: 2019-01-10 | End: 2019-01-10

## 2019-01-10 RX ORDER — CLONIDINE HYDROCHLORIDE 0.1 MG/1
0.1 TABLET ORAL EVERY 4 HOURS PRN
Status: DISCONTINUED | OUTPATIENT
Start: 2019-01-10 | End: 2019-01-11 | Stop reason: HOSPADM

## 2019-01-10 RX ORDER — LOSARTAN POTASSIUM 25 MG/1
50 TABLET ORAL DAILY
Status: DISCONTINUED | OUTPATIENT
Start: 2019-01-10 | End: 2019-01-11 | Stop reason: HOSPADM

## 2019-01-10 RX ORDER — MORPHINE SULFATE 10 MG/ML
5 INJECTION INTRAMUSCULAR; INTRAVENOUS; SUBCUTANEOUS
Status: COMPLETED | OUTPATIENT
Start: 2019-01-10 | End: 2019-01-10

## 2019-01-10 RX ORDER — POLYETHYLENE GLYCOL 3350 17 G/17G
17 POWDER, FOR SOLUTION ORAL DAILY PRN
Status: DISCONTINUED | OUTPATIENT
Start: 2019-01-10 | End: 2019-01-11 | Stop reason: HOSPADM

## 2019-01-10 RX ORDER — OXYCODONE HYDROCHLORIDE 5 MG/1
5 TABLET ORAL EVERY 4 HOURS PRN
Status: DISCONTINUED | OUTPATIENT
Start: 2019-01-10 | End: 2019-01-11 | Stop reason: HOSPADM

## 2019-01-10 RX ORDER — SODIUM CHLORIDE 0.9 % (FLUSH) 0.9 %
5 SYRINGE (ML) INJECTION
Status: DISCONTINUED | OUTPATIENT
Start: 2019-01-10 | End: 2019-01-11 | Stop reason: HOSPADM

## 2019-01-10 RX ADMIN — OXYCODONE HYDROCHLORIDE 5 MG: 5 TABLET ORAL at 10:01

## 2019-01-10 RX ADMIN — OXYCODONE HYDROCHLORIDE 5 MG: 5 TABLET ORAL at 02:01

## 2019-01-10 RX ADMIN — SODIUM CHLORIDE: 0.9 INJECTION, SOLUTION INTRAVENOUS at 08:01

## 2019-01-10 RX ADMIN — ENOXAPARIN SODIUM 80 MG: 100 INJECTION SUBCUTANEOUS at 06:01

## 2019-01-10 RX ADMIN — CYCLOBENZAPRINE HYDROCHLORIDE 10 MG: 10 TABLET, FILM COATED ORAL at 08:01

## 2019-01-10 RX ADMIN — AMLODIPINE BESYLATE 10 MG: 5 TABLET ORAL at 08:01

## 2019-01-10 RX ADMIN — LOSARTAN POTASSIUM 50 MG: 25 TABLET, FILM COATED ORAL at 08:01

## 2019-01-10 RX ADMIN — OXYCODONE HYDROCHLORIDE 5 MG: 5 TABLET ORAL at 06:01

## 2019-01-10 RX ADMIN — APIXABAN 10 MG: 5 TABLET, FILM COATED ORAL at 06:01

## 2019-01-10 RX ADMIN — SODIUM CHLORIDE 500 ML: 0.9 INJECTION, SOLUTION INTRAVENOUS at 03:01

## 2019-01-10 RX ADMIN — SODIUM CHLORIDE: 0.9 INJECTION, SOLUTION INTRAVENOUS at 06:01

## 2019-01-10 RX ADMIN — MORPHINE SULFATE 5 MG: 10 INJECTION INTRAVENOUS at 03:01

## 2019-01-10 RX ADMIN — OXYCODONE HYDROCHLORIDE 5 MG: 5 TABLET ORAL at 11:01

## 2019-01-10 RX ADMIN — FAMOTIDINE 20 MG: 20 TABLET ORAL at 08:01

## 2019-01-10 RX ADMIN — CYCLOBENZAPRINE HYDROCHLORIDE 10 MG: 10 TABLET, FILM COATED ORAL at 05:01

## 2019-01-10 NOTE — CONSULTS
[phone], [place] called [facility] to check price of Eliquis 5 mg for 7 days is 9/ cents, approximately $4 a month..Lilian Butler RN, BSN, STN CCM  1/10/2019

## 2019-01-10 NOTE — NURSING
Report recd care assumed . Arrived in unit AAOx4 heplock in left hand SL . LLE with cast , moves toes . Patient requesting food , sandwhich given to patient with juice . Will continue care .

## 2019-01-10 NOTE — PT/OT/SLP EVAL
Occupational Therapy   Evaluation and Discharge Note    Name: Elias Phelps  MRN: [id number]  Admitting Diagnosis:  Acute deep vein thrombosis (DVT) of left lower extremity      Recommendations:     Discharge Recommendations: (home)  Discharge Equipment Recommendations:  none  Barriers to discharge:  Other (Comment)(The patient states he is homeless)    Assessment:     Elias Phelps is a 53 y.o. male with a medical diagnosis of Acute deep vein thrombosis (DVT) of left lower extremity. At this time, patient is functioning at their prior level of function and does not require further acute OT services.     Plan:     During this hospitalization, patient does not require further acute OT services.  Please re-consult if situation changes.    · Plan of Care Reviewed with: patient    Subjective     Chief Complaint: The patient c/o of severe pain in left leg  Patient/Family Comments/goals: doesn't need therapy    Occupational Profile:  Living Environment: states he is homeless  Previous level of function: Mod I with use of crutches ~2 months  Equipment Used at home:  crutches, axillary, walker, rolling  Assistance upon Discharge: unknown    Pain/Comfort:  · Pain Rating 1: (c/o sever pain in LLE)  · Location - Side 1: Left  · Location 1: leg  · Pain Addressed 1: Nurse notified, Distraction, Reposition(Patient not due for pain meds for 30 min)    Patients cultural, spiritual, Mandaeism conflicts given the current situation:      Objective:     Communicated with: Dallas argueta prior to session.  Patient found in bed with peripheral IV upon OT entry to room.    General Precautions: Standard, fall   Orthopedic Precautions:LLE non weight bearing   Braces: (LLE cast)     Occupational Performance:    Bed Mobility:    · Patient completed Scooting/Bridging with modified independence  · Patient completed Supine to Sit with modified independence    Functional Mobility/Transfers:  · Patient completed Sit <> Stand Transfer with  modified independence and supervision  with  axillary crutches   · Patient completed Toilet Transfer The patient reports amb using his crutches ad dilan when not attached to IV line. The patient refused to transfer to the toilet during OT eval.  · Functional Mobility: The patient amb using crutches with Mod I/(S) in the hallway with PT    Activities of Daily Living:  · Upper Body Dressing: modified independence    · Lower Body Dressing: modified independence      Cognitive/Visual Perceptual:  Cognitive/Psychosocial Skills:     -       Oriented to: Person, Place, Time and Situation   -       Follows Commands/attention:Follows multistep  commands  -       Communication: clear/fluent  -       Memory: No Deficits noted  -       Safety awareness/insight to disability: impaired   -       Mood/Affect/Coping skills/emotional control: Appropriate to situation and some agitation with c/o pain    Physical Exam:  Balance: -       good  Postural examination/scapula alignment:    -       No postural abnormalities identified  Skin integrity: Visible skin intact  Edema:  LLE  Upper Extremity Range of Motion:     -       Right Upper Extremity: WFL  -       Left Upper Extremity: WFL  Upper Extremity Strength:    -       Right Upper Extremity: WFL  -       Left Upper Extremity: WFL    AMPAC 6 Click ADL:  AMPAC Total Score: 24    Treatment & Education:  OT eval is complete.  The patient was educated re: the need to request nursing (S) to amb to the bathroom when connected to the IV.           Education:    Patient left up in chair , reclinedwith all lines intact, call button in reach and nurseDallas notified (pain meds requested    GOALS:   Multidisciplinary Problems     Occupational Therapy Goals     Not on file          Multidisciplinary Problems (Resolved)        Problem: Occupational Therapy Goal    Goal Priority Disciplines Outcome Interventions   Occupational Therapy Goal   (Resolved)     OT, PT/OT Outcome(s) achieved               "      History:     Past Medical History:   Diagnosis Date    Hypertension     Pulmonary embolism     2016       Past Surgical History:   Procedure Laterality Date    ANKLE FRACTURE SURGERY      CLOSED REDUCTION, TIBIA Right 2018    [facility]    ELBOW SURGERY      left elbow    FIBULA FRACTURE SURGERY Left     GALLBLADDER SURGERY N/A     REMOVAL, EXTERNAL FIXATION DEVICE Left 2018    Per[facility] at [facility] OR [address] [place] [address]    REMOVAL, FOREIGN BODY, LOWER EXTREMITY Right 2018    Performed by [facility] at [facility] OR [address] [place] [address]    REVISION, OF EXTERNAL FIXATION Right 2018    Performed by [facility] at [facility] OR [address] [place] [address]       Clinical Decision Makin.  OT Low:  "Pt evaluation falls under low complexity for evaluation coding due to performance deficits noted in 1-3 areas as stated above and 0 co-morbities affecting current functional status. Data obtained from problem focused assessments. No modifications or assistance was required for completion of evaluation. Only brief occupational profile and history review completed."     Time Tracking:     OT Date of Treatment: 01/10/19  OT Start Time: 0950  OT Stop Time: 1005  OT Total Time (min): 15 min    Billable Minutes:Evaluation 15 (with PT)    Charity Figueroa, OT  1/10/2019    "

## 2019-01-10 NOTE — PLAN OF CARE
Problem: Occupational Therapy Goal  Goal: Occupational Therapy Goal  Outcome: Outcome(s) achieved Date Met: 01/10/19  OT eval is complete. The patient is able to amb using axillary crutches with (S). The patient is Mod I with self care. No OT is recommended.    Comments: The patient was educated re: the need to request nursing (S) to amb to the bathroom when connected to the IV.

## 2019-01-10 NOTE — ED PROVIDER NOTES
"Encounter Date: 1/9/2019    SCRIBE #1 NOTE: I, TranMiguelitoMadelaine Stewart, am scribing for, and in the presence of,  Fannie De La Cruz MD. I have scribed the following portions of the note - Other sections scribed: HPI, ROS.       History     Chief Complaint   Patient presents with    Post-op Problem     " to get my check" " i had surgery and it's swelling up on me" reports fracture/repair to L Lower leg in december, pt reports missing the last post-op appointment, c/o pain to L lower leg\, denies fever/chills    Addiction Problem     pt's sister, Charity reports pt is a drug user, Heroin for over 30yrs, family reports pt is homeless and need assistance, pt at this time does not want assistance     CC: Post-op Problem    52 y/o male with HTN, PE (2016), closed reduction to R tibia (9/18/18), L fibula fx surgery, and left removal external fixation device (12/14/18) presents to the ED for emergent evaluation of x2 wk hx of L thigh pain and swelling. The pain is severe (10/10). He reports worsening swelling yesterday that mildly alleviated after elevating his leg. 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  The patient was originally taken to [facility], where he was taken to the OR for spanning external fixation with abx spacer placement and ORIF of the fibula on 8/9/18. He was originally scheduled for f/u surgery on 8/20, but had insurance issues and was unable to do so. The patient states that he's been homeless for the past x3 wks. The patient states he was prescribed Forteo; however, because he isn't able to refrigerate it, he hasn't been taking it for the past few wks. The patient is compliant with his prescribed Eliquis, Percocet, losartan, and amlodipine. The patient also reports chest pain last wk; however, it resolved on its own. The patient last dose of heroin was x3 days ago. The patient denies chest pain, SOB, or fever. No other symptoms reported.      The history is provided by " the patient. No  was used.     Review of patient's allergies indicates:  No Known Allergies  Past Medical History:   Diagnosis Date    Hypertension     Pulmonary embolism     2016     Past Surgical History:   Procedure Laterality Date    ANKLE FRACTURE SURGERY      CLOSED REDUCTION, TIBIA Right 9/18/2018    [facility] 2ND FLR    ELBOW SURGERY      left elbow    FIBULA FRACTURE SURGERY Left     GALLBLADDER SURGERY N/A 1991    REMOVAL, EXTERNAL FIXATION DEVICE Left 12/14/2018    [facility] 2ND FLR    REMOVAL, FOREIGN BODY, LOWER EXTREMITY Right 9/18/2018    [facility] 2ND FLR    REVISION, OF EXTERNAL FIXATION Right 9/18/2018    [facility] OR 2ND FLR     Family History   Problem Relation Age of Onset    Hypertension Mother     Hypertension Father      Social History     Tobacco Use    Smoking status: Current Every Day Smoker     Packs/day: 1.00     Types: Cigarettes    Smokeless tobacco: Never Used   Substance Use Topics    Alcohol use: Yes     Comment: ocassionallly    Drug use: Yes     Comment: heroine 1 week ago     Review of Systems   Constitutional: Negative for chills and fever.   HENT: Negative for congestion, ear pain, rhinorrhea and sore throat.    Eyes: Negative for redness.   Respiratory: Negative for cough and shortness of breath.    Cardiovascular: Positive for chest pain (currently resolved).   Gastrointestinal: Negative for abdominal pain, diarrhea, nausea and vomiting.   Genitourinary: Negative for decreased urine volume, difficulty urinating, dysuria, frequency, hematuria and urgency.   Musculoskeletal: Negative for back pain and neck pain.        (+) L thigh pain and swelling   Skin: Negative for rash.   Neurological: Negative for headaches.   Psychiatric/Behavioral: Negative for confusion.   All other systems reviewed and are  negative.      Physical Exam     Initial Vitals [01/09/19 2015]   BP Pulse Resp Temp SpO2   (!) 162/67 (!) 128 (!) 22 98.8 °F (37.1 °C) 96 %      MAP       --         Physical Exam   Constitutional: Well-developed, Well-nourished, + acute distressed, Alert, writhing in bed  HENT: Normocephalic, Atraumatic, Moist mucous membranes  Eyes: Conjunctiva normal  Neck: Supple, ROM normal  Cardiac: Tachycardic   Pulmonary/Chest wall: No respiratory distress, CTAB, no chest wall tenderness  Abdomen: Soft, nontender, nondistended, no rebound, no guarding  Musc: Normal ROM, No obvious joint swelling, LLE casted, distal cap refill brisk  Lymph: + left lower extremity edema  Neuro: oriented x 3, no focal neurologic deficit  Skin: Pink, warm, dry.  No rashes    Previous medical record and nursing documentation reviewed where available.          ED Course   Procedures  Labs Reviewed   CBC W/ AUTO DIFFERENTIAL - Abnormal; Notable for the following components:       Result Value    WBC 14.06 (*)     RBC 3.28 (*)     Hemoglobin 9.7 (*)     Hematocrit 28.8 (*)     MPV 8.8 (*)     Gran # (ANC) 10.7 (*)     Mono # 1.1 (*)     Gran% 76.0 (*)     Lymph% 14.7 (*)     All other components within normal limits   COMPREHENSIVE METABOLIC PANEL - Abnormal; Notable for the following components:    CO2 22 (*)     Creatinine 1.8 (*)     Albumin 2.7 (*)     Total Bilirubin 1.1 (*)     eGFR if  49 (*)     eGFR if non  42 (*)     All other components within normal limits   D DIMER, QUANTITATIVE - Abnormal; Notable for the following components:    D-Dimer 9.11 (*)     All other components within normal limits   APTT   PROTIME-INR          Imaging Results          US Lower Extremity Veins Left (Final result)     Abnormal  Result time 01/10/19 01:08:51    Final result by Daija Lynch MD (01/10/19 01:08:51)                 Impression:      Positive DVT examination.    This report was flagged in Epic as  abnormal.      Electronically signed by: Daija Lynch  Date:    01/10/2019  Time:    01:08             Narrative:    EXAMINATION:  US LOWER EXTREMITY VEINS LEFT    CLINICAL HISTORY:  Other specified soft tissue disorders    TECHNIQUE:  Duplex and color flow Doppler evaluation and graded compression of the left lower extremity veins was performed.    COMPARISON:  None    FINDINGS:  Examination is limited by overlying cast material.    Left thigh veins: Thrombus is seen within the left common femoral vein, femoral vein, greater saphenous, and popliteal vein.  There is no compressibility or flow.  The veins are not compressible and nor do they have normal phasic flow and augmentation response.  The calf veins were not imaged in their entirety due to the overlying cast.    Left calf veins: Yes.  The visualized calf veins are patent.    Miscellaneous: None                               X-Ray Ankle Complete Left (Final result)  Result time 01/09/19 21:21:48    Final result by Bridger Nagel MD (01/09/19 21:21:48)                 Impression:      Grossly stable postoperative and posttraumatic changes involving the distal tibia/fibula with persistent soft tissue edema.      Electronically signed by: Bridger Nagel MD  Date:    01/09/2019  Time:    21:21             Narrative:    EXAMINATION:  XR ANKLE COMPLETE 3 VIEW LEFT    CLINICAL HISTORY:  Other acute postprocedural pain.    TECHNIQUE:  AP, lateral and oblique views of the left ankle were performed.    COMPARISON:  12/21/2018.    FINDINGS:  Overlying cast material limits fine bony and soft tissue detail.  Stable displaced fracture involving the distal tibial shaft with mild medial displacement of the distal fracture fragment.  Postoperative changes of open reduction internal fixation, with orthopedic screws traversing the distal tibia.  Plate and screw fixation of a distal fibular fracture which demonstrates satisfactory alignment.  Tibiotalar joint is  unremarkable.  External fixation holes are noted involving the calcaneus as seen previously.  There is moderate soft tissue swelling about the ankle joint, similar to the prior exam.  Evaluation for new bony erosion is limited by overlying casting material.                                 Medical Decision Making:   Clinical Tests:   Lab Tests: Ordered and Reviewed  Radiological Study: Ordered and Reviewed  ED Management:  53 year old male with history of PE and recent immobilization/casting of the LLE who presents to the ED with swelling and pain to the LLE.  He reports taking all of his eliquis as prescribed but then tells a nurse that it has been a few days.  US of the legs positive for DVT.  I am unsure of the actual last time he took his medication.  Also concern for access to resources giventhat he is homeless.   I will start eliquis as if it is being newly initiated.  He also demonstrates mild IAIN - will give IVF.  Lastly, he does have leukocytosis.  Perhaps secondary to clots.  No signs of active infection.  He is afebrile.  While I cannot rule out infection under cast, given extent of recent orthopedic procedures, I do not feel it necessary to remove the cast without the ability to recast.  Will place in observation for initiation of anticoagulants and observation for any other etiology of LLE pain.            Scribe Attestation:   Scribe #1: I performed the above scribed service and the documentation accurately describes the services I performed. I attest to the accuracy of the note.    Attending Attestation:           Physician Attestation for Scribe:  Physician Attestation Statement for Scribe #1: I, Fannie De La Cruz MD, reviewed documentation, as scribed by Montserrat Stewart in my presence, and it is both accurate and complete.                    Clinical Impression:   Diagnoses of Post-op pain and Leg swelling were pertinent to this visit.                             Fannie De La Cruz MD  01/12/19 [id number]

## 2019-01-10 NOTE — ASSESSMENT & PLAN NOTE
He was non compliant with oAC with eliquis,he is stable on RA with no sign of respiratory distress,will continue with lennox.

## 2019-01-10 NOTE — ASSESSMENT & PLAN NOTE
Likely duo to immobility and non compliance with eliquis,also with history of leg surgery,will continue with lovenox,he is stable on RA with no SOB.

## 2019-01-10 NOTE — ASSESSMENT & PLAN NOTE
Has been consulted avoid illicit drug use.avoid IV narcotic,will use ibuprofen,muscle relaxant,prn clonidine.

## 2019-01-10 NOTE — H&P
"[facility] Medicine  History & Physical    Patient Name: Elias Phelps  MRN: [id number]  Admission Date: 1/9/2019  Attending Physician: Nas Ji MD   Primary Care Provider: Primary Doctor No         Patient information was obtained from patient and ER records.     Subjective:     Principal Problem:Acute deep vein thrombosis (DVT) of left lower extremity    Chief Complaint:   Chief Complaint   Patient presents with    Post-op Problem     " to get my check" " i had surgery and it's swelling up on me" reports fracture/repair to L Lower leg in december, pt reports missing the last post-op appointment, c/o pain to L lower leg\, denies fever/chills    Addiction Problem     pt's sister, Charity reports pt is a drug user, Heroin for over 30yrs, family reports pt is homeless and need assistance, pt at this time does not want assistance        HPI: 52 y/o male with HTN, PE (2016), closed reduction to R tibia (9/18/18), L fibula fx surgery, and left removal external fixation device (12/14/18) presents to the ED for emergent evaluation of x2 wk hx of L thigh pain and swelling. The pain is severe (10/10). He reports worsening swelling yesterday that mildly alleviated after elevating his leg. 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  The patient was originally taken to [facility], where he was taken to the OR for spanning external fixation with abx spacer placement and ORIF of the fibula on 8/9/18. He was originally scheduled for f/u surgery on 8/20, but had insurance issues and was unable to do so. The patient states that he's been homeless for the past x3 wks. The patient states he was prescribed Forteo; however, because he isn't able to refrigerate it, he hasn't been taking it for the past few wks. The patient is non compliant with his prescribed Eliquis for history of PE,,. The patient also reports chest pain last wk; however, it resolved on " its own. Patient is IV heroin user,The patient last dose of heroin was x3 days ago. The patient denies chest pain, SOB, or fever. No other symptoms reported.US  Left leg show,Thrombus is seen within the left common femoral vein, femoral vein, greater saphenous, and popliteal vein,patient has been started on lovenox,he is stable on RA with no SOB.        Past Medical History:   Diagnosis Date    Hypertension     Pulmonary embolism     2016       Past Surgical History:   Procedure Laterality Date    ANKLE FRACTURE SURGERY      CLOSED REDUCTION, TIBIA Right 9/18/2018    [facility]formed [facility] OR 2ND FLR    ELBOW SURGERY      left elbow    FIBULA FRACTURE SURGERY Left     GALLBLADDER SURGERY N/A 1991    REMOVAL, EXTERNAL FIXATION DEVICE Left 12/14/2018    Performed [facility] at [facility] OR [address] [place] [address]    REMOVAL, FOREIGN BODY, LOWER EXTREMITY Right 9/18/2018    Performed by [facility] at [facility] OR [address] [place] [address]    REVISION, OF EXTERNAL FIXATION Right 9/18/2018    Performed by [facility] at [facility] OR [address] [place] [address]       Review of patient's allergies indicates:  No Known Allergies    No current facility-administered medications on file prior to encounter.      Current Outpatient Medications on File Prior to Encounter   Medication Sig    amLODIPine (NORVASC) 10 MG tablet Take 10 mg by mouth once daily.     ELIQUIS 5 mg Tab Take 5 mg by mouth 2 (two) times daily.     losartan (COZAAR) 50 MG tablet Take 50 mg by mouth once daily.     oxyCODONE-acetaminophen (PERCOCET)  mg per tablet Take 1 tablet by mouth every 4 (four) hours as needed.    medical supply, miscellaneous (MISCELLANEOUS MEDICAL SUPPLY MISC) Rolling walker    teriparatide (FORTEO) 20 mcg/dose - 600 mcg/2.4 mL PnIj Inject 0.08 mLs (20 mcg total) into the skin once daily.     Family History     Problem Relation (Age of Onset)    Hypertension Mother, Father        Tobacco Use    Smoking status: Current Every Day  Smoker     Packs/day: 1.00     Types: Cigarettes    Smokeless tobacco: Never Used   Substance and Sexual Activity    Alcohol use: Yes     Comment: ocassionallly    Drug use: Yes     Comment: heroine 1 week ago    Sexual activity: Not on file     Review of Systems   Constitutional: Positive for activity change and appetite change.   HENT: Negative for congestion and drooling.    Eyes: Negative for discharge and itching.   Respiratory: Negative for apnea and chest tightness.    Cardiovascular: Negative for chest pain and leg swelling.   Gastrointestinal: Negative for abdominal distention and abdominal pain.   Endocrine: Negative for heat intolerance.   Genitourinary: Negative for difficulty urinating and dysuria.   Musculoskeletal: Positive for arthralgias and back pain.   Skin: Negative for color change and pallor.   Allergic/Immunologic: Negative for food allergies.   Neurological: Negative for dizziness.   Hematological: Negative for adenopathy. Does not bruise/bleed easily.   Psychiatric/Behavioral: Negative for agitation and behavioral problems.     Objective:     Vital Signs (Most Recent):  Temp: 98.6 °F (37 °C) (01/10/19 0402)  Pulse: 93 (01/10/19 0402)  Resp: 20 (01/10/19 0402)  BP: 118/74 (01/10/19 0402)  SpO2: 96 % (01/10/19 0402) Vital Signs (24h Range):  Temp:  [98 °F (36.7 °C)-98.9 °F (37.2 °C)] 98.6 °F (37 °C)  Pulse:  [] 93  Resp:  [20-22] 20  SpO2:  [95 %-98 %] 96 %  BP: (109-162)/(58-74) 118/74     Weight: 77.1 kg (170 lb)  Body mass index is 28.29 kg/m².    Physical Exam   Constitutional: He is oriented to person, place, and time. No distress.   HENT:   Head: Atraumatic.   Eyes: EOM are normal. Pupils are equal, round, and reactive to light.   Neck: Normal range of motion. Neck supple.   Cardiovascular: Normal rate and regular rhythm.   Pulmonary/Chest: Effort normal and breath sounds normal.   Abdominal: Soft. He exhibits distension.   Musculoskeletal: He exhibits tenderness.   Left leg  cast in place.   Neurological: He is oriented to person, place, and time. No cranial nerve deficit. Coordination normal.   Skin: Skin is warm and dry.   Psychiatric: He has a normal mood and affect. His behavior is normal.         CRANIAL NERVES     CN III, IV, VI   Pupils are equal, round, and reactive to light.  Extraocular motions are normal.        Significant Labs:   BMP:   Recent Labs   Lab 01/09/19  2359         K 3.7      CO2 22*   BUN 20   CREATININE 1.8*   CALCIUM 9.1     CBC:   Recent Labs   Lab 01/09/19  2359   WBC 14.06*   HGB 9.7*   HCT 28.8*          Significant Imaging: reviewed.    Assessment/Plan:     * Acute deep vein thrombosis (DVT) of left lower extremity    Likely duo to immobility and non compliance with eliquis,also with history of leg surgery,will continue with lovenox,he is stable on RA with no SOB.       Acute renal failure    Will continue with IVF,folow daily labs.       History of pulmonary embolism    He was non compliant with oAC with eliquis,he is stable on RA with no sign of respiratory distress,will continue with lennox.       Essential hypertension    Stable at this time,will use prn clonidine.       Heroin abuse    Has been consulted avoid illicit drug use.avoid IV narcotic,will use ibuprofen,muscle relaxant,prn clonidine.       Open displaced pilon fracture of left tibia    He say after fall,With history of external device placement,currently in on cast,consult ortho for evaluation,PT,OT.         VTE Risk Mitigation (From admission, onward)        Ordered     enoxaparin injection 80 mg  Every 12 hours (non-standard times)      01/10/19 0718     IP VTE HIGH RISK PATIENT  Once      01/10/19 0603     Place ELENA hose  Until discontinued      01/10/19 0603             Nas Ji MD  Department of Hospital Medicine   [facility]

## 2019-01-10 NOTE — HPI
54 y/o male with HTN, PE (2016), closed reduction to R tibia (9/18/18), L fibula fx surgery, and left removal external fixation device (12/14/18) presents to the ED for emergent evaluation of x2 wk hx of L thigh pain and swelling. The pain is severe (10/10). He reports worsening swelling yesterday that mildly alleviated after elevating his leg. 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  The patient was originally taken to [facility], where he was taken to the OR for spanning external fixation with abx spacer placement and ORIF of the fibula on 8/9/18. He was originally scheduled for f/u surgery on 8/20, but had insurance issues and was unable to do so. The patient states that he's been homeless for the past x3 wks. The patient states he was prescribed Forteo; however, because he isn't able to refrigerate it, he hasn't been taking it for the past few wks. The patient is non compliant with his prescribed Eliquis for history of PE,,. The patient also reports chest pain last wk; however, it resolved on its own. Patient is IV heroin user,The patient last dose of heroin was x3 days ago. The patient denies chest pain, SOB, or fever. No other symptoms reported.US  Left leg show,Thrombus is seen within the left common femoral vein, femoral vein, greater saphenous, and popliteal vein,patient has been started on lovenox,he is stable on RA with no SOB.

## 2019-01-10 NOTE — ED TRIAGE NOTES
Patient stated that he's having left leg pain X 2 weeks. He stated that he missed his post-op appt. Patient stated that when he was going to the restroom he thinks he hurt his leg. Patient is taking no medication and is homeless at this time.

## 2019-01-10 NOTE — PT/OT/SLP EVAL
Physical Therapy Evaluation and Discharge Note    Patient Name:  Elias Phelps   MRN:  [id number]    Recommendations:     Discharge Recommendations:  (home health services)   Discharge Equipment Recommendations: none   Barriers to discharge: Decreased caregiver support; Pt reported that he's homeless.      Assessment:     Elias Phelps is a 53 y.o. male admitted with a medical diagnosis of Acute deep vein thrombosis (DVT) of left lower extremity.  At this time, patient is functioning at their prior level of function and does not require further acute PT services.     Recent Surgery: * No surgery found *   Pt with recent h/o L distal tibia/fibula fx s/p sx and cast (September-December 2018).    Plan:     During this hospitalization, patient does not require further acute PT services.  Please re-consult if situation changes.      Subjective     Chief Complaint: pain to LLE  Patient/Family Comments/goals: Pt stated that he needs more pain meds.   Pain/Comfort:  · Pain Rating 1: (Pt appeared to be in severe pain to LLE.)  · Pain Addressed 1: Pre-medicate for activity, Cessation of Activity, Nurse notified    Living Environment:  Pt reported that he's homeless.    Prior to admission, patients level of function was mod I with ambulation using B axillary crutches ~2 months now.  Equipment used at home: crutches, axillary, walker, rolling.  Upon discharge, patient will have assistance from no one.    Objective:     Patient found in bed upon PT entry to room found with: peripheral IV     General Precautions: Standard, fall   Orthopedic Precautions:LLE non weight bearing   Braces: (LLE cast)     Exams:  · Cognitive Exam:  Patient was able to follow multiple commands.   · Gross Motor Coordination:  WFL  · Postural Exam:  Patient presented with the following abnormalities:    · -       No postural abnormalities identified  · Sensation:    · -       Intact  light/touch BLE  · Skin Integrity/Edema:      · -       Skin  integrity: Visible skin intact  · -       Edema: Moderate LLE  · RLE ROM: WNL  · RLE Strength: WNL  · LLE ROM: WFL except ankle 2* cast  · LLE Strength: WFL    Functional Mobility:  · Bed Mobility:     · Scooting: modified independence  · Supine to Sit: modified independence with HOB elevated   · Transfers:     · Sit to Stand:  supervision with axillary crutches  · Bed to Chair: supervision with  axillary crutches  using  Step Transfer  · Toilet transfer: Pt reported that he has been ambulating to the bathroom without difficulty and without assistance when his IV was not connected.  · Gait: Pt ambulated ~80 ft with supervision (2* management of IV pole) using B axillary crutches and NWB LLE.  · Balance: Pt with fair+ balance.     AM-PAC 6 CLICK MOBILITY  Total Score:22       Therapeutic Activities and Exercises:  Pt educated on the elevation of LLE and performance of supine LLE therex, HS, hip abd/add and SLR.       Patient left up in chair reclined with LLE elevated on pillow with all lines intact, call button in reach and nurse Dallas notified.    GOALS:   Multidisciplinary Problems     Physical Therapy Goals     Not on file          Multidisciplinary Problems (Resolved)        Problem: Physical Therapy Goal    Goal Priority Disciplines Outcome Goal Variances Interventions   Physical Therapy Goal   (Resolved)     PT, PT/OT Outcome(s) achieved                     History:     Past Medical History:   Diagnosis Date    Hypertension     Pulmonary embolism     2016       Past Surgical History:   Procedure Laterality Date    ANKLE FRACTURE SURGERY      CLOSED REDUCTION, TIBIA Right 9/18/2018    Performed by B[facility]arer [facility] at [facility] OR 2ND FLR    ELBOW SURGERY      left elbow    FIBULA FRACTURE SURGERY Left     GALLBLADDER SURGERY N/A 1991    REMOVAL, EXTERNAL FIXATION DEVICE Left 12/14/2018    Performed by [facility] at [facility] OR 2ND [facility]    REMOVAL, FOREIGN BODY, LOWER EXTREMITY Right 9/18/2018     Performed by [facility] at [facility] OR [address] [place] [address]    REVISION, OF EXTERNAL FIXATION Right 9/18/2018    Performed by B[facility]athi Shearer MD at [facility] OR [address] [place] [address]       Clinical Decision Making:     History  Co-morbidities and personal factors that may impact the plan of care Examination  Body Structures and Functions, activity limitations and participation restrictions that may impact the plan of care Clinical Presentation   Decision Making/ Complexity Score   Co-morbidities:   [] Time since onset of injury / illness / exacerbation  [x] Status of current condition  [x]Patient's cognitive status and safety concerns    [x] Multiple Medical Problems (see med hx)  Personal Factors:   [] Patient's age  [] Prior Level of function   [x] Patient's home situation (environment and family support)  [] Patient's level of motivation  [] Expected progression of patient      HISTORY:(criteria)    [] 15357 - no personal factors/history    [] 72804 - has 1-2 personal factor/comorbidity     [x] 17833 - has >3 personal factor/comorbidity     Body Regions:  [x] Objective examination findings  [] Head     []  Neck  [] Trunk   [] Upper Extremity  [x] Lower Extremity    Body Systems:  [] For communication ability, affect, cognition, language, and learning style: the assessment of the ability to make needs known, consciousness, orientation (person, place, and time), expected emotional /behavioral responses, and learning preferences (eg, learning barriers, education  needs)  [] For the neuromuscular system: a general assessment of gross coordinated movement (eg, balance, gait, locomotion, transfers, and transitions) and motor function  (motor control and motor learning)  [] For the musculoskeletal system: the assessment of gross symmetry, gross range of motion, gross strength, height, and weight  [x] For the integumentary system: the assessment of pliability(texture), presence of scar formation, skin color, and skin integrity  [x] For  cardiovascular/pulmonary system: the assessment of heart rate, respiratory rate, blood pressure, and edema     Activity limitations:    [x] Patient's cognitive status and saf ety concerns          [x] Status of current condition      [x] Weight bearing restriction  [] Cardiopulmunary Restriction    Participation Restrictions:   [] Goals and goal agreement with the patient     [] Rehab potential (prognosis) and probable outcome      Examination of Body System: (criteria)    [x] 74705 - addressing 1-2 elements    [] 15534 - addressing a total of 3 or more elements     [] 63514 -  Addressing a total of 4 or more elements         Clinical Presentation: (criteria)  Stable - 62471     On examination of body system using standardized tests and measures patient presents with 1-2 elements from any of the following: body structures and functions, activity limitations, and/or participation restrictions.  Leading to a clinical presentation that is considered stable and/or uncomplicated                              Clinical Decision Making  (Eval Complexity):  Low- 42300     Time Tracking:     PT Received On: 01/10/19  PT Start Time: 0949     PT Stop Time: 1005  PT Total Time (min): 16 min     Billable Minutes: Evaluation 16 min co-eval with JHONNY Harding, PT  01/10/2019

## 2019-01-10 NOTE — SUBJECTIVE & OBJECTIVE
Past Medical History:   Diagnosis Date    Hypertension     Pulmonary embolism     2016       Past Surgical History:   Procedure Laterality Date    ANKLE FRACTURE SURGERY      CLOSED REDUCTION, TIBIA Right 9/18/2018    [facility] 2ND FLR    ELBOW SURGERY      left elbow    FIBULA FRACTURE SURGERY Left     GALLBLADDER SURGERY N/A 1991    REMOVAL, EXTERNAL FIXATION DEVICE Left 12/14/2018    [facility]    REMOVAL, FOREIGN BODY, LOWER EXTREMITY Right 9/18/2018    [facility]    REVISION, OF EXTERNAL FIXATION Right 9/18/2018    [facility]       Review of patient's allergies indicates:  No Known Allergies    No current facility-administered medications on file prior to encounter.      Current Outpatient Medications on File Prior to Encounter   Medication Sig    amLODIPine (NORVASC) 10 MG tablet Take 10 mg by mouth once daily.     ELIQUIS 5 mg Tab Take 5 mg by mouth 2 (two) times daily.     losartan (COZAAR) 50 MG tablet Take 50 mg by mouth once daily.     oxyCODONE-acetaminophen (PERCOCET)  mg per tablet Take 1 tablet by mouth every 4 (four) hours as needed.    medical supply, miscellaneous (MISCELLANEOUS MEDICAL SUPPLY MISC) Rolling walker    teriparatide (FORTEO) 20 mcg/dose - 600 mcg/2.4 mL PnIj Inject 0.08 mLs (20 mcg total) into the skin once daily.     Family History     Problem Relation (Age of Onset)    Hypertension Mother, Father        Tobacco Use    Smoking status: Current Every Day Smoker     Packs/day: 1.00     Types: Cigarettes    Smokeless tobacco: Never Used   Substance and Sexual Activity    Alcohol use: Yes     Comment: ocassionallly    Drug use: Yes     Comment: heroine 1 week ago    Sexual activity: Not on file     Review of Systems   Constitutional: Positive for activity change and appetite change.   HENT: Negative for  congestion and drooling.    Eyes: Negative for discharge and itching.   Respiratory: Negative for apnea and chest tightness.    Cardiovascular: Negative for chest pain and leg swelling.   Gastrointestinal: Negative for abdominal distention and abdominal pain.   Endocrine: Negative for heat intolerance.   Genitourinary: Negative for difficulty urinating and dysuria.   Musculoskeletal: Positive for arthralgias and back pain.   Skin: Negative for color change and pallor.   Allergic/Immunologic: Negative for food allergies.   Neurological: Negative for dizziness.   Hematological: Negative for adenopathy. Does not bruise/bleed easily.   Psychiatric/Behavioral: Negative for agitation and behavioral problems.     Objective:     Vital Signs (Most Recent):  Temp: 98.6 °F (37 °C) (01/10/19 0402)  Pulse: 93 (01/10/19 0402)  Resp: 20 (01/10/19 0402)  BP: 118/74 (01/10/19 0402)  SpO2: 96 % (01/10/19 0402) Vital Signs (24h Range):  Temp:  [98 °F (36.7 °C)-98.9 °F (37.2 °C)] 98.6 °F (37 °C)  Pulse:  [] 93  Resp:  [20-22] 20  SpO2:  [95 %-98 %] 96 %  BP: (109-162)/(58-74) 118/74     Weight: 77.1 kg (170 lb)  Body mass index is 28.29 kg/m².    Physical Exam   Constitutional: He is oriented to person, place, and time. No distress.   HENT:   Head: Atraumatic.   Eyes: EOM are normal. Pupils are equal, round, and reactive to light.   Neck: Normal range of motion. Neck supple.   Cardiovascular: Normal rate and regular rhythm.   Pulmonary/Chest: Effort normal and breath sounds normal.   Abdominal: Soft. He exhibits distension.   Musculoskeletal: He exhibits tenderness.   Left leg cast in place.   Neurological: He is oriented to person, place, and time. No cranial nerve deficit. Coordination normal.   Skin: Skin is warm and dry.   Psychiatric: He has a normal mood and affect. His behavior is normal.         CRANIAL NERVES     CN III, IV, VI   Pupils are equal, round, and reactive to light.  Extraocular motions are normal.         Significant Labs:   BMP:   Recent Labs   Lab 01/09/19  2359         K 3.7      CO2 22*   BUN 20   CREATININE 1.8*   CALCIUM 9.1     CBC:   Recent Labs   Lab 01/09/19  2359   WBC 14.06*   HGB 9.7*   HCT 28.8*          Significant Imaging: reviewed.

## 2019-01-10 NOTE — PLAN OF CARE
Problem: Physical Therapy Goal  Goal: Physical Therapy Goal  Outcome: Outcome(s) achieved Date Met: 01/10/19  Pt ambulated ~80 ft with supervision (2* management of IV pole) using B axillary crutches and NWB LLE.  Pt to continue ambulating and OOB>chair with nursing supervision while in the hospital.  Pt required no further acute skilled PT services.

## 2019-01-10 NOTE — CONSULTS
Pt.treated at [facility] following open fracture of the distal left tibia and fibula. Had external fixator removed by Dr. Shearer on 12/15/2018. Complaining of swelling in the left leg with thigh pain. Evaluation revealed DVT in the left femoral,saphenous and popliteal veins.Started on enoxaparin       Exam: Cast on the left lower leg. Mild swelling in the left leg. NV intact. No pain with movement of the toes. Toes warm.      Xray of the left leg: Plate on the distal fibula. 2 screws in the distal tibia. Healing fracture of the distal tibia. No change in fracture position since 12/21/2018.  Cast is not too tight. No need to do anything differently at this time. Refer back to Dr. Shearer.

## 2019-01-10 NOTE — ASSESSMENT & PLAN NOTE
He say after fall,With history of external device placement,currently in on cast,consult ortho for evaluation,PT,OT.

## 2019-01-10 NOTE — ED NOTES
Pt has not filled Rx given post surgery, pt states that he has no one to bring him to  medications. Pt c/o severe pain and swelling to left leg

## 2019-01-11 VITALS
HEIGHT: 65 IN | HEART RATE: 81 BPM | BODY MASS INDEX: 28.58 KG/M2 | WEIGHT: 171.56 LBS | DIASTOLIC BLOOD PRESSURE: 73 MMHG | RESPIRATION RATE: 17 BRPM | SYSTOLIC BLOOD PRESSURE: 130 MMHG | TEMPERATURE: 98 F | OXYGEN SATURATION: 98 %

## 2019-01-11 LAB
ALBUMIN SERPL BCP-MCNC: 2.1 G/DL
ALP SERPL-CCNC: 91 U/L
ALT SERPL W/O P-5'-P-CCNC: 20 U/L
ANION GAP SERPL CALC-SCNC: 6 MMOL/L
AST SERPL-CCNC: 19 U/L
BASOPHILS # BLD AUTO: 0.01 K/UL
BASOPHILS NFR BLD: 0.1 %
BILIRUB SERPL-MCNC: 0.4 MG/DL
BUN SERPL-MCNC: 18 MG/DL
CALCIUM SERPL-MCNC: 8.1 MG/DL
CHLORIDE SERPL-SCNC: 107 MMOL/L
CO2 SERPL-SCNC: 26 MMOL/L
CREAT SERPL-MCNC: 1.4 MG/DL
DIFFERENTIAL METHOD: ABNORMAL
EOSINOPHIL # BLD AUTO: 0.2 K/UL
EOSINOPHIL NFR BLD: 2.6 %
ERYTHROCYTE [DISTWIDTH] IN BLOOD BY AUTOMATED COUNT: 13.5 %
EST. GFR  (AFRICAN AMERICAN): >60 ML/MIN/1.73 M^2
EST. GFR  (NON AFRICAN AMERICAN): 57 ML/MIN/1.73 M^2
GLUCOSE SERPL-MCNC: 108 MG/DL
HCT VFR BLD AUTO: 25.9 %
HGB BLD-MCNC: 8.6 G/DL
LYMPHOCYTES # BLD AUTO: 2.1 K/UL
LYMPHOCYTES NFR BLD: 25 %
MCH RBC QN AUTO: 29.6 PG
MCHC RBC AUTO-ENTMCNC: 33.2 G/DL
MCV RBC AUTO: 89 FL
MONOCYTES # BLD AUTO: 0.6 K/UL
MONOCYTES NFR BLD: 7.3 %
NEUTROPHILS # BLD AUTO: 5.3 K/UL
NEUTROPHILS NFR BLD: 65 %
PLATELET # BLD AUTO: 339 K/UL
PMV BLD AUTO: 8.9 FL
POTASSIUM SERPL-SCNC: 3.7 MMOL/L
PROT SERPL-MCNC: 6.4 G/DL
RBC # BLD AUTO: 2.91 M/UL
SODIUM SERPL-SCNC: 139 MMOL/L
WBC # BLD AUTO: 8.2 K/UL

## 2019-01-11 PROCEDURE — 80053 COMPREHEN METABOLIC PANEL: CPT

## 2019-01-11 PROCEDURE — 25000003 PHARM REV CODE 250: Performed by: EMERGENCY MEDICINE

## 2019-01-11 PROCEDURE — 36415 COLL VENOUS BLD VENIPUNCTURE: CPT

## 2019-01-11 PROCEDURE — 63600175 PHARM REV CODE 636 W HCPCS: Performed by: HOSPITALIST

## 2019-01-11 PROCEDURE — 25000003 PHARM REV CODE 250: Performed by: HOSPITALIST

## 2019-01-11 PROCEDURE — 85025 COMPLETE CBC W/AUTO DIFF WBC: CPT

## 2019-01-11 RX ORDER — OXYCODONE HYDROCHLORIDE 5 MG/1
5 TABLET ORAL EVERY 6 HOURS PRN
Qty: 15 TABLET | Refills: 0 | Status: SHIPPED | OUTPATIENT
Start: 2019-01-11 | End: 2019-01-16

## 2019-01-11 RX ADMIN — FAMOTIDINE 20 MG: 20 TABLET ORAL at 09:01

## 2019-01-11 RX ADMIN — OXYCODONE HYDROCHLORIDE 5 MG: 5 TABLET ORAL at 04:01

## 2019-01-11 RX ADMIN — AMLODIPINE BESYLATE 10 MG: 5 TABLET ORAL at 09:01

## 2019-01-11 RX ADMIN — SODIUM CHLORIDE: 0.9 INJECTION, SOLUTION INTRAVENOUS at 05:01

## 2019-01-11 RX ADMIN — CYCLOBENZAPRINE HYDROCHLORIDE 10 MG: 10 TABLET, FILM COATED ORAL at 04:01

## 2019-01-11 RX ADMIN — LOSARTAN POTASSIUM 50 MG: 25 TABLET, FILM COATED ORAL at 09:01

## 2019-01-11 RX ADMIN — ENOXAPARIN SODIUM 80 MG: 100 INJECTION SUBCUTANEOUS at 06:01

## 2019-01-11 RX ADMIN — OXYCODONE HYDROCHLORIDE 5 MG: 5 TABLET ORAL at 09:01

## 2019-01-11 NOTE — PROGRESS NOTES
TN made several calls to shelters to assist he patient.  [facility](handicap accommodations), [facility], and [facility]. Patient was given list with addresses, times and phone numbers with intake times.  Patient says he will call someone again.  TN informed  Patient informed that he is medically stable for discharge and a discharge is on the chart at present.  A cab will be provided at 12noon he will be brought down to front hospital door at 11:50AM since the cab drivers do not wait.  TN informed med surg nurse Kiarra and charge of the above.  Patient says if he goes to a shelter it would be the [facility] at [address] [facility]. [phone] with picture ID..Lilian Butler RN, BSN, STN [facility]  1/11/2019

## 2019-01-11 NOTE — PROGRESS NOTES
TN provided patient with a list of shelters, patient insist that he is homeless and has no where to live.  TN informed him that a cab ride to an address or to  Shelter will be provided for him.  Patient said he could not get a ride until 4pm but he again said he had no where to go.  TN again assured patient that [facility] would provide him with a one way taxi ride, patient expressed understanding..Lilian Butler RN, BSN, STN [facility]  1/11/2019

## 2019-01-11 NOTE — PLAN OF CARE
Problem: Adult Inpatient Plan of Care  Goal: Plan of Care Review  Outcome: Ongoing (interventions implemented as appropriate)  Keep left lower ext. Elevated on pillow. No IV pain medicine per MD's order. PT and OT working with patient. Discharge planning in progress. Lovenox as ordered.

## 2019-01-11 NOTE — PLAN OF CARE
01/10/19 1038   Discharge Assessment   Assessment Type Discharge Planning Assessment   Confirmed/corrected address and phone number on facesheet? Yes   Assessment information obtained from? Patient   Communicated expected length of stay with patient/caregiver no   Prior to hospitilization cognitive status: Alert/Oriented;No Deficits   Prior to hospitalization functional status: Independent;Assistive Equipment   Current cognitive status: Alert/Oriented;No Deficits   Current Functional Status: Independent;Assistive Equipment   Lives With other (see comments)  (homeless)   Able to Return to Prior Arrangements no   Is patient able to care for self after discharge? Yes   Who are your caregiver(s) and their phone number(s)? (says there is no one)   Readmission Within the Last 30 Days unable to assess   Patient currently being followed by outpatient case management? No   Patient currently receives any other outside agency services? No   Equipment Currently Used at Home crutches, axillary;walker, rolling   Do you have any problems affording any of your prescribed medications? No   Is the patient taking medications as prescribed? yes   Does the patient have transportation home? No   Transportation Anticipated (a cab will be called to go to [facility])   Does the patient receive services at the [facility] Clinic? No  (on eliquis)   Discharge Plan A Other  (shelter)   Discharge Plan B Other   DME Needed Upon Discharge  none   Patient/Family in Agreement with Plan yes     [facility] - [place], [place] - [address] AT [facility] [address]  [address]  [place] [address]  Phone: [phone] Fax: [phone]    [facility]  [address] [facility] [address]  Phone: [phone] Fax: [phone]    [facility] [address] [facility] [place] - [address] AT [facility] [address]  [address]  [place] [address]  Phone: [phone] Fax:  [phone]

## 2019-01-11 NOTE — PROGRESS NOTES
Harvey says someone will need to call her at 11:50AM to remind her to call cab since the cab will come immediately when called. For pt to go to [facility] at [address] [facility]. [phone] ..Lilian Butler RN, BSN, STN [facility]  1/11/2019

## 2019-01-11 NOTE — PLAN OF CARE
Problem: Adult Inpatient Plan of Care  Goal: Plan of Care Review  Outcome: Ongoing (interventions implemented as appropriate)   01/11/19 0214   Plan of Care Review   Plan of Care Reviewed With patient   No falls, trauma or injury this shift. Pain managed with oral pain medications. Skin without any breakdown observed. Physical and occupatinal therapy completed their plan of care. Continue with plan of care and continue to monitor patient/

## 2019-01-11 NOTE — PROGRESS NOTES
Med surg nurse, Kiarra says patient will be picked by his mother now..Lilian Butler RN, BSN, STN [facility]  1/11/2019

## 2019-01-11 NOTE — NURSING
Pt discharged per MD order. IV removed. Catheter tip intact. No distress noted. Discharge instructions explained. AVS given to patient and placed in Blue Folder. Pt verbalized understanding. VSS. Afebrile. No complains of \ N/V, diarrhea, or SOB.  Paper Rx given to patient and placed in Blue Folder.      Patient and his Mother (on the phone) both declined cab services to pick patient up.  Per mother patient can wait for her down in ER's waiting area.  Mother reports that someone can pick patient up after 3:30pm.  Patient is in agreement with plan.

## 2019-01-11 NOTE — PROGRESS NOTES
TN taught Symptoms and Problems for dvt home care with pt and  with teach back:  1. Blood in stool 2.black stool TN placed education sheet in [facility]s [employer] Al[employer] Than You..     Help at home will be frommother assisting in pt's recovery.     TN taught patient about things he is responsible for when discharged:  Particularly on how to Manage his Care At Home:  1. Getting his prescriptions filled.  2. Taking his medications as directed. DO NOT MISS ANY DOSES!  3. Going to his follow-up doctor appointments.   .  TN checked [employer] for cm/sw name, spectra link #, pt contact, and d/c disposition....Lilian Butler RN, BSN, STN [facility]

## 2019-01-11 NOTE — PLAN OF CARE
01/11/19 1150   Final Note   Assessment Type Final Discharge Note   Anticipated Discharge Disposition Home   What phone number can be called within the next 1-3 days to see how you are doing after discharge? (see chrt)   Hospital Follow Up  Appt(s) scheduled? Yes   Discharge plans and expectations educations in teach back method with documentation complete? Yes   Right Care Referral Info   Referral Type NO CARE

## 2019-01-11 NOTE — PROGRESS NOTES
WRITTEN DISCHARGE INFORMATION:  Follow-up Information     orthopedic in [facility]  In 1 week.    Contact information:  Patient says he goes to [facility] already           [facility]. Schedule an appointment as soon as possible for a visit on 1/17/2019.    Why:  out patient services: at 1:00pm, to follow up from hospital for dvt and for left foot in cast. picture id and insurance information.  Contact information:  [address] [place] [address]  [phone]               Things that YOU are responsible for to Manage Your Care At Home:  1. Getting your prescriptions filled.  2. Taking you medications as directed. DO NOT MISS ANY DOSES!  3. Going to your follow-up doctor appointments. This is important because it allows the doctor to monitor your progress and to determine if any changes need to be made to your treatment plan.                                                                Help at Home  After discharge for assistance [facility] On [facility] Nurse [facility] Line 24/7 assistance  [phone]     Thank you for choosing [facility] for your care.  Please answer any calls you may receive from [facility] we want to continue to support you as you manage your healthcare needs.  Sincerely, Your [facility] Healthcare Manager is,  Lilian Butler RN [facility] [phone]

## 2019-01-11 NOTE — NURSING
Patient was educated by Dr. Prabhakar the importance of being compliant with his Eliquis.  MD educated patient on DVT and the potential complications if patient was not compliant with his medications.  MD also educated patient on the side effects of Eliquis and to go to the nearest ER for uncontrolled bleeding.      Patient verbalized understanding.  This RN was witness.

## 2019-01-11 NOTE — HOSPITAL COURSE
52 y/o male with HTN, PE (2016), closed reduction to R tibia (9/18/18), L fibula fx surgery, and left removal external fixation device (12/14/18) presents to the ED for emergent evaluation of x2 wk hx of L thigh pain and swelling. The pain is severe (10/10). He reports worsening swelling  that mildly alleviated after elevating his leg. 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  The patient was originally taken to [facility], where he was taken to the OR for spanning external fixation with abx spacer placement and ORIF of the fibula on 8/9/18. He was originally scheduled for f/u surgery on 8/20, but had insurance issues and was unable to do so. The patient states that he's been homeless for the past x3 wks. The patient states he was prescribed Forteo; however, because he isn't able to refrigerate it, he hasn't been taking it for the past few wks. The patient is non compliant with his prescribed Eliquis for history of PE,,. The patient also reports chest pain last wk; however, it resolved on its own. Patient is IV heroin user,The patient last dose of heroin was x3 days ago. The patient denies chest pain, SOB, or fever. No other symptoms reported.US  Left leg show,Thrombus is seen within the left common femoral vein, femoral vein, greater saphenous, and popliteal vein,patient was started on lovenox,he was stable on RA with no SOB.he had mild IAIN,which is resolved with IVF.ortho was consulted,X ray show stable left tibia fracture,cast was in place,patient was stable on RA,his eliquis was re prescribed,he was consulted in presence of nurse Plunkett,that if he does not take his Eliquis he may die from thrombosis,side affect with possible irreversible bleeding explained,comparison with coumadin was done,patient understand consulting and was agree with taking Eliquis,he did well with PT,OT.  Patient has bene discharged with PCP and ortho in [facility] follow up.

## 2019-01-11 NOTE — DISCHARGE SUMMARY
[facility] Hospital Medicine  Discharge Summary      Patient Name: Elias Phelps  MRN: [id number]  Admission Date: 1/9/2019  Hospital Length of Stay: 1 days  Discharge Date and Time:  01/11/2019 1:00 PM  Attending Physician: Jana att. providers found   Discharging Provider: Nas Ji MD  Primary Care Provider: Primary Doctor Jana      HPI:   52 y/o male with HTN, PE (2016), closed reduction to R tibia (9/18/18), L fibula fx surgery, and left removal external fixation device (12/14/18) presents to the ED for emergent evaluation of x2 wk hx of L thigh pain and swelling. The pain is severe (10/10). He reports worsening swelling yesterday that mildly alleviated after elevating his leg. 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  The patient was originally taken to [facility], where he was taken to the OR for spanning external fixation with abx spacer placement and ORIF of the fibula on 8/9/18. He was originally scheduled for f/u surgery on 8/20, but had insurance issues and was unable to do so. The patient states that he's been homeless for the past x3 wks. The patient states he was prescribed Forteo; however, because he isn't able to refrigerate it, he hasn't been taking it for the past few wks. The patient is non compliant with his prescribed Eliquis for history of PE,,. The patient also reports chest pain last wk; however, it resolved on its own. Patient is IV heroin user,The patient last dose of heroin was x3 days ago. The patient denies chest pain, SOB, or fever. No other symptoms reported.US  Left leg show,Thrombus is seen within the left common femoral vein, femoral vein, greater saphenous, and popliteal vein,patient has been started on lovenox,he is stable on RA with no SOB.        * No surgery found *      Hospital Course:   52 y/o male with HTN, PE (2016), closed reduction to R tibia (9/18/18), L fibula fx surgery, and left removal external  fixation device (12/14/18) presents to the ED for emergent evaluation of x2 wk hx of L thigh pain and swelling. The pain is severe (10/10). He reports worsening swelling  that mildly alleviated after elevating his leg. 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  The patient was originally taken to [facility], where he was taken to the OR for spanning external fixation with abx spacer placement and ORIF of the fibula on 8/9/18. He was originally scheduled for f/u surgery on 8/20, but had insurance issues and was unable to do so. The patient states that he's been homeless for the past x3 wks. The patient states he was prescribed Forteo; however, because he isn't able to refrigerate it, he hasn't been taking it for the past few wks. The patient is non compliant with his prescribed Eliquis for history of PE,,. The patient also reports chest pain last wk; however, it resolved on its own. Patient is IV heroin user,The patient last dose of heroin was x3 days ago. The patient denies chest pain, SOB, or fever. No other symptoms reported.US  Left leg show,Thrombus is seen within the left common femoral vein, femoral vein, greater saphenous, and popliteal vein,patient was started on lovenox,he was stable on RA with no SOB.he had mild IAIN,which is resolved with IVF.ortho was consulted,X ray show stable left tibia fracture,cast was in place,patient was stable on RA,his eliquis was re prescribed,he was consulted in presence of nurse Plunkett,that if he does not take his Eliquis he may die from thrombosis,side affect with possible irreversible bleeding explained,comparison with coumadin was done,patient understand consulting and was agree with taking Eliquis,he did well with PT,OT.  Patient has bene discharged with PCP and ortho in [place] follow up.     Consults:   Consults (From admission, onward)        Status Ordering Provider     Inpatient consult to Orthopedic Surgery  Once     Provider:   Eddie Vega MD    Completed FREDDIE RODRIGUEZ     Inpatient consult to Social Work  Once     Provider:  (Not yet assigned)    Completed FREDDIE RODRIGUEZ          No new Assessment & Plan notes have been filed under this hospital service since the last note was generated.  Service: Hospital Medicine    Final Active Diagnoses:    Diagnosis Date Noted POA    PRINCIPAL PROBLEM:  Acute deep vein thrombosis (DVT) of left lower extremity [I82.402] 01/10/2019 Yes    Acute renal failure [N17.9] 01/10/2019 Yes    History of pulmonary embolism [Z86.711] 01/10/2019 Yes    Heroin abuse [F11.10] 01/10/2019 Yes    Essential hypertension [I10] 01/10/2019 Yes    Open displaced pilon fracture of left tibia [S82.872B] 09/18/2018 Yes      Problems Resolved During this Admission:       Discharged Condition: stable    Disposition: Home or Self Care    Follow Up:  Follow-up Information     orthopedic in [facility]  In 1 week.    Contact information:  Patient says he goes to [facility] already           [facility] Schedule an appointment as soon as possible for a visit on 1/17/2019.    Why:  out patient services: at 1:00pm, to follow up from hospital for dvt and for left foot in cast. picture id and insurance information.  Contact information:  [address] [place] [id number] [phone]                 Patient Instructions:      Activity as tolerated       Significant Diagnostic Studies: Labs:   BMP:   Recent Labs   Lab 01/09/19  2359 01/11/19  0430    108    139   K 3.7 3.7    107   CO2 22* 26   BUN 20 18   CREATININE 1.8* 1.4   CALCIUM 9.1 8.1*    and CBC   Recent Labs   Lab 01/09/19  2359 01/11/19  0430   WBC 14.06* 8.20   HGB 9.7* 8.6*   HCT 28.8* 25.9*    339     Radiology: X-Ray: left leg   Ultrasound: leg     Pending Diagnostic Studies:     None         Medications:  Reconciled Home Medications:      Medication List      START taking these medications    oxyCODONE 5 MG  immediate release tablet  Commonly known as:  ROXICODONE  Take 1 tablet (5 mg total) by mouth every 6 (six) hours as needed.        CHANGE how you take these medications    * apixaban 5 mg Tab  Commonly known as:  ELIQUIS  Take 2 tablets (10 mg total) by mouth 2 (two) times daily. for 7 days  What changed:  how much to take     * apixaban 5 mg Tab  Commonly known as:  ELIQUIS  Take 1 tablet (5 mg total) by mouth 2 (two) times daily.  Start taking on:  1/18/2019  What changed:  You were already taking a medication with the same name, and this prescription was added. Make sure you understand how and when to take each.         * This list has 2 medication(s) that are the same as other medications prescribed for you. Read the directions carefully, and ask your doctor or other care provider to review them with you.            CONTINUE taking these medications    amLODIPine 10 MG tablet  Commonly known as:  NORVASC  Take 10 mg by mouth once daily.     FORTEO 20 mcg/dose - 600 mcg/2.4 mL Pnij  Generic drug:  teriparatide  Inject 0.08 mLs (20 mcg total) into the skin once daily.     losartan 50 MG tablet  Commonly known as:  COZAAR  Take 50 mg by mouth once daily.     MISCELLANEOUS MEDICAL SUPPLY MISC  Rolling walker        STOP taking these medications    oxyCODONE-acetaminophen  mg per tablet  Commonly known as:  PERCOCET            Indwelling Lines/Drains at time of discharge:   Lines/Drains/Airways          None          Time spent on the discharge of patient: over 30  minutes  Patient was seen and examined on the date of discharge and determined to be suitable for discharge.         Nas Ji MD  Department of Hospital Medicine  [facility]

## 2019-01-14 ENCOUNTER — TELEPHONE (OUTPATIENT)
Dept: PHARMACY | Facility: CLINIC | Age: 54
End: 2019-01-14

## 2019-01-14 ENCOUNTER — TELEPHONE (OUTPATIENT)
Dept: ORTHOPEDICS | Facility: CLINIC | Age: 54
End: 2019-01-14

## 2019-01-14 NOTE — TELEPHONE ENCOUNTER
"Patient seen in ED - positive for DVT. Supposed to have been taking Eliquis, but unclear last time patient took it. Was restarted on Eliquis as new start. Patient has cast on ankle from surgery 8/2018 and 12/2018. Notes from ED indiate patient is homeless    Patient called back from #[phone] (best number to reach him). Patient requested shipment of Forteo instead of p/u. Last dose of forteo around 12/28. Ship 1/14, delivery 1/15. $0 at 004. Included sharps, alcohol swabs, and needles in fill. Patient trying to make an appt with Yennifer Enciso for surgery f/u (none scheduled - provided him with phone number). Patient not aware to taking Eliquis 2 tabs (10 mg) BID x7 days, then 1 tabs BID thereafter. Advised him to reivew DC paperwork and p/u new scritps sent to [facility]. Patient still has "enough" Eliquis remaining.    [facility] to f/u with patient at next refill to discuss compliance on all meds. Will message Yennifer Enciso' staff regarding conversation  "

## 2019-01-16 ENCOUNTER — OFFICE VISIT (OUTPATIENT)
Dept: ORTHOPEDICS | Facility: CLINIC | Age: 54
End: 2019-01-16
Payer: MEDICARE

## 2019-01-16 ENCOUNTER — HOSPITAL ENCOUNTER (OUTPATIENT)
Dept: RADIOLOGY | Facility: HOSPITAL | Age: 54
Discharge: HOME | End: 2019-01-16
Attending: PHYSICIAN ASSISTANT
Payer: MEDICARE

## 2019-01-16 VITALS
RESPIRATION RATE: 16 BRPM | HEIGHT: 65 IN | DIASTOLIC BLOOD PRESSURE: 87 MMHG | WEIGHT: 171.5 LBS | SYSTOLIC BLOOD PRESSURE: 123 MMHG | TEMPERATURE: 98 F | HEART RATE: 101 BPM | BODY MASS INDEX: 28.57 KG/M2

## 2019-01-16 DIAGNOSIS — T14.90XA TRAUMA: ICD-10-CM

## 2019-01-16 DIAGNOSIS — T14.90XA TRAUMA: Primary | ICD-10-CM

## 2019-01-16 DIAGNOSIS — Z09 S/P ORTHOPEDIC SURGERY, FOLLOW-UP EXAM: ICD-10-CM

## 2019-01-16 PROCEDURE — 99999 PR PBB SHADOW E&M-EST. PATIENT-LVL IV: CPT | Mod: PBBFAC,,, | Performed by: PHYSICIAN ASSISTANT

## 2019-01-16 PROCEDURE — 73610 X-RAY EXAM OF ANKLE: CPT | Mod: 26,LT,, | Performed by: RADIOLOGY

## 2019-01-16 PROCEDURE — 99024 POSTOP FOLLOW-UP VISIT: CPT | Mod: S$GLB,,, | Performed by: PHYSICIAN ASSISTANT

## 2019-01-16 PROCEDURE — 73610 X-RAY EXAM OF ANKLE: CPT | Mod: TC,LT

## 2019-01-16 PROCEDURE — 99999 PR PBB SHADOW E&M-EST. PATIENT-LVL IV: ICD-10-PCS | Mod: PBBFAC,,, | Performed by: PHYSICIAN ASSISTANT

## 2019-01-16 PROCEDURE — 73610 XR ANKLE COMPLETE 3 VIEW LEFT: ICD-10-PCS | Mod: 26,LT,, | Performed by: RADIOLOGY

## 2019-01-16 PROCEDURE — 99024 PR POST-OP FOLLOW-UP VISIT: ICD-10-PCS | Mod: S$GLB,,, | Performed by: PHYSICIAN ASSISTANT

## 2019-01-16 RX ORDER — OXYCODONE AND ACETAMINOPHEN 10; 325 MG/1; MG/1
1 TABLET ORAL
Qty: 42 TABLET | Refills: 0 | Status: SHIPPED | OUTPATIENT
Start: 2019-01-16 | End: 2019-02-21

## 2019-01-16 NOTE — PROGRESS NOTES
Removed fiberglass short leg cast from patients right leg per Yennifer Enciso PA-C written orders. Patient tolerated well.

## 2019-01-16 NOTE — PROGRESS NOTES
"Mr. Phelps is 51 yo male with PM Hx of HTN and PE 2016 had open left tibial pilon fracture with fibula on 8/9/18 treated initially with I&D, fibular plating spanning ex fix of the tibia with limited internal fixation. He was seen on 9/16.  At that point, he'd fallen into a bit of varus with some medial displacement of the tibia.  He was taken to the OR and revised the ex fix with new tibial and calcaneal pins and included the first and fifth metatarsals. At that time he also had removed an antibiotic spacer from the tibia and placed infuse BMP2. He has external fixator removed on 12/14/2018.     Mr. Phelps is here today for a post-operative visit after a   1.  Removal of external fixation of the left leg under anesthesia.  2.  Stress exam under fluoroscopy and anesthesia, left distal tibia fracture.  by Dr. Shearer  on 12/14/2018.    Interval History:   Patient was seen in the ED on 1/09/2019 and admitted for DVT. Patient was taking Eliquis for PE, but was no " taking it like I should".   Of note in ED note patient is at this time homeless for the past 3 weeks. Also it is noted that he has not been using his forteo as recommended because he hes been unable to refrigerate it.  Also listed is that patient is an IV heroin user. He stated that his last dosage of heroin was 1 week ago as well as use as marijuana and alcohol. This was done prior to his admission. He denied use since discharge. He stated that he only did it because he was in so much pain. He stated that he has started taking medications as prescribed.   He denies chest pain, SOB, or fever    Physical exam:  Dressing taken down.  Incision is clean, dry and intact.  decreased range of motion in all planes.  TTP at fracture site. No signs of infection.     RADS: Cast has been removed.  Distal fibular fracture transfixed by plate and screws which appear intact.  Screws are noted within the distal tibia, near the plafond.  There is a distal tibial fracture " approximately 3.5 cm proximal to the plafond with comminution and 1 cm medial displacement, similar to prior study.  Ankle mortise is symmetric.  Talar dome is maintained.  Soft tissue swelling noted.    Assessment:  Post-op visit     Plan:  Current care, treatment plan, precautions, activity level/ modifications, limitations, rehabilitation exercises and proposed future treatment were discussed with the patient. We discussed the need to monitor for changes in symptoms and condition and report them to the physician.  Discussed importance of compliance with all appointments and follow up examinations.   - plantar fasciitis boot. Will remove for ROM  - NWB   - range of motion as tolerated  - pain medication: refill needed,    Pain medication refill policy provided to patient for review. Discussed with patient that this will be his last prescription for pain medication. He will need to establish care with pain management or PMR for pain medication. He understands that in these facilities he will be drug tested and that if is test positive or illicit use of drugs he will be denied pain medication. He understands that He will no longer receive medication from our department.    - he is to follow up with PCP for DVT. He understands importance of takign medication as prescribed.   - Patient is to return to clinic in 4 weeks  At time x-ray of his ankle is needed     If there are any questions prior to scheduled follow up, the patient was instructed to contact the office

## 2019-01-30 ENCOUNTER — TELEPHONE (OUTPATIENT)
Dept: ORTHOPEDICS | Facility: CLINIC | Age: 54
End: 2019-01-30

## 2019-01-30 DIAGNOSIS — Z09 S/P ORTHOPEDIC SURGERY, FOLLOW-UP EXAM: Primary | ICD-10-CM

## 2019-01-30 NOTE — TELEPHONE ENCOUNTER
----- Message from Yennifer Enciso PA-C sent at 1/30/2019 12:53 PM CST -----  Contact: Self/ [phone]  Please call and advise per last note he is to no longer receive pain medication  He is to see pain management.     ----- Message -----  From: Destiney Scott MA  Sent: 1/30/2019  10:55 AM  To: Yennifer Enciso PA-C    Refill on meds. Thank you  ----- Message -----  From: Kathi Xie  Sent: 1/30/2019  10:51 AM  To: [facility] De Olive[facility] Staff    Patient was calling the office back from a missed phone call.

## 2019-01-30 NOTE — TELEPHONE ENCOUNTER
Notified pt. Per ASHANTI Enciso PA-C last clinic note: Discussed with patient that this will be his last prescription for pain medication. He will need to establish care with pain management or PMR for pain medication. He understands that in these facilities he will be drug tested and that if is test positive or illicit use of drugs he will be denied pain medication. He understands that He will no longer receive medication from our department. Patient  was given the number to Pain Management [phone] to schedule an appointment/ message sent [facility] department [place] to see about an appointment also.  Patient states verbal understanding and has no further questions.

## 2019-02-01 ENCOUNTER — TELEPHONE (OUTPATIENT)
Dept: PHYSICAL MEDICINE AND REHAB | Facility: CLINIC | Age: 54
End: 2019-02-01

## 2019-02-01 NOTE — TELEPHONE ENCOUNTER
Called several times to talk with the patient no answer, was calling to give the patient the telephone number to [place] [facility].

## 2019-02-01 NOTE — TELEPHONE ENCOUNTER
----- Message from Destiney Scott MA sent at 1/30/2019  1:11 PM CST -----  Pt need an appointment for pain management. Notes in EPIC. Please contact patient. Thank you

## 2019-02-07 ENCOUNTER — TELEPHONE (OUTPATIENT)
Dept: PHARMACY | Facility: CLINIC | Age: 54
End: 2019-02-07

## 2019-02-11 ENCOUNTER — TELEPHONE (OUTPATIENT)
Dept: PHARMACY | Facility: CLINIC | Age: 54
End: 2019-02-11

## 2019-02-12 ENCOUNTER — TELEPHONE (OUTPATIENT)
Dept: ORTHOPEDICS | Facility: CLINIC | Age: 54
End: 2019-02-12

## 2019-02-12 NOTE — TELEPHONE ENCOUNTER
Left a voice mail x 2 for pt to return my call regarding rescheduling his missed post op visit with Yennifer Enciso PA-C on 2/7/19.   Pending return call.

## 2019-02-15 NOTE — TELEPHONE ENCOUNTER
Called for Forteo refill/Eliquis f/u    LVM on #[id number]  Unable to LVM on #[phone]  Spoke with Mother on #[phone] - she reported Elias has lost his phone and she has been unable to get in touch with him. Advised her OSP needs to speak with him regarding medication and [facility] has also been trying to reach him. She will have him call or call us back with new number if she is able to reach him.

## 2019-02-20 NOTE — TELEPHONE ENCOUNTER
LVM on #[id number]. No documentation patient or mother has reached out for f/u with MD. Will chart check one more week. Previously documented patient is homeless. Will send message to [facility], sent postcard, and f/u in 1 week.

## 2019-02-21 ENCOUNTER — HOSPITAL ENCOUNTER (OUTPATIENT)
Dept: RADIOLOGY | Facility: HOSPITAL | Age: 54
Discharge: HOME | End: 2019-02-21
Attending: PHYSICIAN ASSISTANT
Payer: MEDICARE

## 2019-02-21 ENCOUNTER — OFFICE VISIT (OUTPATIENT)
Dept: ORTHOPEDICS | Facility: CLINIC | Age: 54
End: 2019-02-21
Payer: MEDICARE

## 2019-02-21 VITALS — BODY MASS INDEX: 28.57 KG/M2 | WEIGHT: 171.5 LBS | HEIGHT: 65 IN

## 2019-02-21 DIAGNOSIS — S82.872E TYPE I OR II OPEN DISPLACED PILON FRACTURE OF LEFT TIBIA WITH ROUTINE HEALING, SUBSEQUENT ENCOUNTER: ICD-10-CM

## 2019-02-21 DIAGNOSIS — Z09 S/P ORTHOPEDIC SURGERY, FOLLOW-UP EXAM: ICD-10-CM

## 2019-02-21 DIAGNOSIS — Z09 S/P ORTHOPEDIC SURGERY, FOLLOW-UP EXAM: Primary | ICD-10-CM

## 2019-02-21 PROCEDURE — 73610 XR ANKLE COMPLETE 3 VIEW LEFT: ICD-10-PCS | Mod: 26,LT,, | Performed by: RADIOLOGY

## 2019-02-21 PROCEDURE — 99999 PR PBB SHADOW E&M-EST. PATIENT-LVL III: ICD-10-PCS | Mod: PBBFAC,,, | Performed by: PHYSICIAN ASSISTANT

## 2019-02-21 PROCEDURE — 99999 PR PBB SHADOW E&M-EST. PATIENT-LVL III: CPT | Mod: PBBFAC,,, | Performed by: PHYSICIAN ASSISTANT

## 2019-02-21 PROCEDURE — 99024 PR POST-OP FOLLOW-UP VISIT: ICD-10-PCS | Mod: S$GLB,,, | Performed by: PHYSICIAN ASSISTANT

## 2019-02-21 PROCEDURE — 99024 POSTOP FOLLOW-UP VISIT: CPT | Mod: S$GLB,,, | Performed by: PHYSICIAN ASSISTANT

## 2019-02-21 PROCEDURE — 73610 X-RAY EXAM OF ANKLE: CPT | Mod: 26,LT,, | Performed by: RADIOLOGY

## 2019-02-21 PROCEDURE — 73610 X-RAY EXAM OF ANKLE: CPT | Mod: TC,LT

## 2019-02-22 ENCOUNTER — TELEPHONE (OUTPATIENT)
Dept: ORTHOPEDICS | Facility: CLINIC | Age: 54
End: 2019-02-22

## 2019-02-22 ENCOUNTER — OUTPATIENT CASE MANAGEMENT (OUTPATIENT)
Dept: ADMINISTRATIVE | Facility: OTHER | Age: 54
End: 2019-02-22

## 2019-02-22 NOTE — PROGRESS NOTES
Thank you for the referral.  Patient has been assigned to Jordyn Mitchell LMSW for low risk screening for Outpatient Case Management.     Reason for referral: S/P orthopedic surgery, follow-up exam  Type I or II open displaced pilon fracture of left tibia with routine healing, subsequent encounter  He has prior history of drug use and is homeless. He has transportation issues.    Please contact [facility] at ext. [phone] with any questions.    Thank you,    Courtney Daily, [facility]  Outpatient Care Mgmt.  [phone]

## 2019-02-22 NOTE — PROGRESS NOTES
Mr. Phelps is 53 yo male with PM Hx of HTN and PE 2016 had open left tibial pilon fracture with fibula on 8/9/18 treated initially with I&D, fibular plating spanning ex fix of the tibia with limited internal fixation. He was seen on 9/16.  At that point, he'd fallen into a bit of varus with some medial displacement of the tibia.  He was taken to the OR and revised the ex fix with new tibial and calcaneal pins and included the first and fifth metatarsals. At that time he also had removed an antibiotic spacer from the tibia and placed infuse BMP2. He has external fixator removed on 12/14/2018.     Mr. Phelps is here today for a post-operative visit after a   1.  Removal of external fixation of the left leg under anesthesia.  2.  Stress exam under fluoroscopy and anesthesia, left distal tibia fracture.  by Dr. Shearer  on 12/14/2018.    Interval History:  Patient stated that he is doing okish.   He stated that his pain is not controlled. He does not have any pain medication and is in need of refill. He stated that he has not done any illicit drugs since his previous time. He stated that he has not tried to reach out to any pain management specialist as previously directed to.     He stated that he is using his Forteo as prescribed.   He also reports that his is taking his Eliquis as prescribed.   He has not placed any weight on the leg according to the patient. He has done his best to adhere to our recommendation. He does reports mainly staying in the wheelchair because it is hard to use the crutches or walker hat he has.   He denies chest pain, SOB, fever or chills.     Physical exam:  Dressing taken down.  Incision is clean, dry and intact.  decreased range of motion in all planes.  TTP at fracture site. No signs of infection.     RADS: No new fractures/dislocations.  Hardware in place with minimal callous formation.  No signs of hardware loosening/failure    Assessment:  Post-op visit     Plan:  Current care,  treatment plan, precautions, activity level/ modifications, limitations, rehabilitation exercises and proposed future treatment were discussed with the patient. We discussed the need to monitor for changes in symptoms and condition and report them to the physician.  Discussed importance of compliance with all appointments and follow up examinations.   -  he may wash the area with antibacterial soap in the shower. Will not submerge until the incision is completely healed  -Patient was advised to monitor wound closely and multiple times daily for any problems. Call clinic immediately or report to ED for immediate medical attention for any complications including reopening of wound, drainage, purulence, redness, streaking, odor, pain out of proportion, fever, chills, etc.   -PT/OT, [place], Patient is responsible to establish and continue care   -range of motion as tolerated    - may slowly advance weightbearing to 50% partial   - pain medication: refill needed, but not given due to refill policy    Pain medication refill policy provided to patient for review. Discussed with patient that this time as previously discussed he will no longer receive prescription for pain medication from our office. He will need to establish care with pain management or PMR for pain medication. He was given handout on information on pain management specialist out side of [facility] system that he is to contact for an appointment. He will contact today. He understands that in these facilities he will be drug tested and that if is test positive or illicit use of drugs he will be denied pain medication. He understands that He will no longer receive medication from our department.    - he is to follow up with PCP for DVT. He understands importance of taking medication as prescribed.   -Will continue Forteo to assist with bone healing.   - Out patient case management referral place to assist with transportation issues that patient has.   -  Patient is to return to clinic in 4 weeks  At time x-ray of his ankle is needed     If there are any questions prior to scheduled follow up, the patient was instructed to contact the office

## 2019-02-25 ENCOUNTER — OUTPATIENT CASE MANAGEMENT (OUTPATIENT)
Dept: ADMINISTRATIVE | Facility: OTHER | Age: 54
End: 2019-02-25

## 2019-02-25 NOTE — PROGRESS NOTES
This LMSW attempted to reach patient/caregiver to provide resource and left msg requesting a return call.  Letter with contact information was sent via Patient Portal  to patient/caregiver.  Referral source notified.

## 2019-02-25 NOTE — LETTER
February 25, 2019    Elias Phelps  [address] [id number]             [facility] [address] [place] [address] Dear:Elias Phelps     I am writing from the Outpatient Complex Care Management Department at [facility].  I received a referral from Yennifer FERGUSON to contact you regarding any needs you may have. I have attempted to contact you or your cargeiver by phone two times unsuccessfully.  Please contact the Outpatient Complex Care Management Department at [phone] if you would like to discuss your needs.      Sincerely,         Jordyn Mitchell LMSW

## 2019-02-26 ENCOUNTER — HOSPITAL ENCOUNTER (EMERGENCY)
Facility: HOSPITAL | Age: 54
Discharge: HOME | End: 2019-02-26
Attending: EMERGENCY MEDICINE
Payer: MEDICARE

## 2019-02-26 ENCOUNTER — TELEPHONE (OUTPATIENT)
Dept: PHARMACY | Facility: CLINIC | Age: 54
End: 2019-02-26

## 2019-02-26 VITALS
DIASTOLIC BLOOD PRESSURE: 91 MMHG | BODY MASS INDEX: 26.66 KG/M2 | TEMPERATURE: 98 F | OXYGEN SATURATION: 100 % | HEIGHT: 65 IN | SYSTOLIC BLOOD PRESSURE: 137 MMHG | RESPIRATION RATE: 18 BRPM | WEIGHT: 160 LBS | HEART RATE: 97 BPM

## 2019-02-26 DIAGNOSIS — M79.673 FOOT PAIN: Primary | ICD-10-CM

## 2019-02-26 PROCEDURE — 99284 PR EMERGENCY DEPT VISIT,LEVEL IV: ICD-10-PCS | Mod: ,,, | Performed by: EMERGENCY MEDICINE

## 2019-02-26 PROCEDURE — 99284 EMERGENCY DEPT VISIT MOD MDM: CPT | Mod: ,,, | Performed by: EMERGENCY MEDICINE

## 2019-02-26 PROCEDURE — 99284 EMERGENCY DEPT VISIT MOD MDM: CPT

## 2019-02-26 PROCEDURE — 25000003 PHARM REV CODE 250: Performed by: EMERGENCY MEDICINE

## 2019-02-26 PROCEDURE — 63600175 PHARM REV CODE 636 W HCPCS: Performed by: EMERGENCY MEDICINE

## 2019-02-26 RX ORDER — ACETAMINOPHEN 500 MG
1000 TABLET ORAL
Status: COMPLETED | OUTPATIENT
Start: 2019-02-26 | End: 2019-02-26

## 2019-02-26 RX ORDER — KETOROLAC TROMETHAMINE 30 MG/ML
10 INJECTION, SOLUTION INTRAMUSCULAR; INTRAVENOUS
Status: DISCONTINUED | OUTPATIENT
Start: 2019-02-26 | End: 2019-02-26 | Stop reason: HOSPADM

## 2019-02-26 RX ADMIN — ACETAMINOPHEN 1000 MG: 500 TABLET ORAL at 06:02

## 2019-02-26 NOTE — TELEPHONE ENCOUNTER
"Refill call for Forteo - patient returned OSPs call from wife's phone. Patient confirms the need of a refill. Will ship on  with patient consent. Copay $3.80 at 004. Patient has "about 2 days" days supply remaining - does not know when pen was opened, but still has liquid in it.  Next dose: .  Patient has not started any new medications, 1-5 missed doses and denies new side effects.   Patient taking medication as prescribed, no changes in direction. Patient did not have any concerns or questions at this time.  & address verified.    Patient has missed 2-3 doses of Forteo because he was "busy with other things." He has been storing it in fridge, but doesn't know when last opened his pen. Advised him the pen is good for 28 days in the fridge once opened, and to write the date of his first injection on the pen.     He went to the emergency room today for pain in his foot (has had 3 surgeries). Also saw Yennifer Enciso this week. Alexisto continued and reports compliance with Eliquis for DVT - advised him very important not to miss Eliquis to help prevent future clots. Also advised him Forteo should help with bone healing causing his foot pain.     Patient called from wife's phone [phone]. Will have her call back today for [facility]. Patient reports his phone number should be active soon as he is getting a new phone. [facility] to f/u at next refill.  "

## 2019-02-26 NOTE — ED NOTES
Elias Phelps, 53 y.o. male, presents to the ED w/ complaint of left foot pain; brace in place; multiple surgeries to the area; pt states that he was walking using crutches when he fell and injured the area; sensation and movement appropriate; pain is noted to be 10/10; denies hitting his head or LOC; denies other concerns at this time     Triage note:  Chief Complaint   Patient presents with    Foot Pain     Presents to ED c/o pain to L foot since yesterday. Pt states he fell off his crutchers and landed on his injured foot. Denies LOC or head injury.        The following is provided by the pt and/or family, as well as information provided in pt's chart:      Review of patient's allergies indicates:  No Known Allergies  Past Medical History:   Diagnosis Date    Hypertension     Pulmonary embolism     2016       Pt identity confirmed; pt educated on course of action in and purpose of intake area; chair in lowest position and adjusted for maximum comfort; pt provided w/ remote and educated on its use, as well as to not vertically elevate chair without a member of the staff present; pt advised to call for assistance when needed; pt educated on reasons for holding PO intake until lab work and imaging have resulted; Pt informed on nearest restroom's location; pt verbalized understanding & agreed   .

## 2019-02-26 NOTE — ED PROVIDER NOTES
Encounter Date: 2/26/2019    SCRIBE #1 NOTE: I, Helen Stewart, am scribing for, and in the presence of,  Antoinette Keen MD. I have scribed the following portions of the note - Other sections scribed: HPI ROS PE.       History     Chief Complaint   Patient presents with    Foot Pain     Presents to ED c/o pain to L foot since yesterday. Pt states he fell off his crutchers and landed on his injured foot. Denies LOC or head injury.      The patient is a 53 y.o. male with PMHx of HTN and PE, who presents to the ED for left foot pain. Patient states he was using his crutches yesterday when he hit a hole and landed on his left foot. He reports a constant, 10/10 pain since he landed on his foot yesterday. Patient had multiple surgeries done of his left foot. Denies loss of consciousness or head trauma.       The history is provided by the patient.     Review of patient's allergies indicates:  No Known Allergies  Past Medical History:   Diagnosis Date    Hypertension     Pulmonary embolism     2016     Past Surgical History:   Procedure Laterality Date    ANKLE FRACTURE SURGERY      CLOSED REDUCTION, TIBIA Right 9/18/2018    Performed by [facility]arer [facility] at [facility] OR 2ND FLR    ELBOW SURGERY      left elbow    FIBULA FRACTURE SURGERY Left     GALLBLADDER SURGERY N/A 1991    REMOVAL, EXTERNAL FIXATION DEVICE Left 12/14/2018    Performed by [facility] MD at [facility] OR 2ND FLR    REMOVAL, FOREIGN BODY, LOWER EXTREMITY Right 9/18/2018    Performed by [facility] at [facility] OR [facility]LR    REVISION, OF EXTERNAL FIXATION Right 9/18/2018    Performed by [facility]arer MD at [facility] OR [facility]LR     Family History   Problem Relation Age of Onset    Hypertension Mother     Hypertension Father      Social History     Tobacco Use    Smoking status: Current Every Day Smoker     Packs/day: 1.00     Types: Cigarettes    Smokeless tobacco: Never Used   Substance Use Topics    Alcohol use: Yes     Comment:  ocassionallly    Drug use: Yes     Types: Heroin     Comment: heroine 1 week ago     Review of Systems   Constitutional: Negative for fever.   HENT: Negative for sore throat.    Respiratory: Negative for shortness of breath.    Cardiovascular: Negative for chest pain.   Gastrointestinal: Negative for nausea.   Genitourinary: Negative for dysuria.   Musculoskeletal: Negative for back pain.        Left foot pain.   Skin: Negative for rash.   Neurological: Negative for weakness.   Hematological: Does not bruise/bleed easily.       Physical Exam     Initial Vitals [02/26/19 0506]   BP Pulse Resp Temp SpO2   (!) 137/91 97 18 97.7 °F (36.5 °C) 100 %      MAP       --         Physical Exam    Constitutional: He appears well-developed and well-nourished. No distress.   HENT:   Mouth/Throat: Oropharynx is clear and moist.   Neck: Neck supple.   Pulmonary/Chest: No respiratory distress.   Abdominal: Soft. He exhibits no distension.   Musculoskeletal: He exhibits edema.   Boot on his left leg. Boot removed with mild swelling over left ankle and foot. No obvious deformity. No skin breaks. Does not have DPP but does have doppler signal. No erythema or warmth.    Neurological: He is alert and oriented to person, place, and time.   Skin: No rash noted. No erythema.         ED Course   Procedures  Labs Reviewed - No data to display       Imaging Results          X-Ray Foot Complete Left (Final result)  Result time 02/26/19 05:58:43    Final result by Oscar Camacho MD (02/26/19 05:58:43)                 Impression:      Stable appearing postoperative changes as above.  No definite new skeletal abnormality.      Electronically signed by: Oscar Camacho MD  Date:    02/26/2019  Time:    05:58             Narrative:    EXAMINATION:  XR ANKLE COMPLETE 3 VIEW LEFT; XR FOOT COMPLETE 3 VIEW LEFT    CLINICAL HISTORY:  Pain in unspecified foot    TECHNIQUE:  AP, lateral and oblique views of the left ankle were performed.  AP, lateral,  and oblique views of the left foot were performed.    COMPARISON:  Left ankle radiograph series 02/21/2019    FINDINGS:  There are stable postoperative changes of the distal tibia and fibula again identified.  Hardware appears intact.  Fracture fragment alignment and position appears stable from prior examination.  Postprocedural change of the calcaneus and forefoot from prior external fixation pin is noted.  No definite new skeletal abnormality identified of the left ankle or foot.  There is osteopenia present presumably relating to disuse.  There is mild diffuse soft tissue edema present.                               X-Ray Ankle Complete Left (Final result)  Result time 02/26/19 05:58:43    Final result by Oscar Camacho MD (02/26/19 05:58:43)                 Impression:      Stable appearing postoperative changes as above.  No definite new skeletal abnormality.      Electronically signed by: Oscar Camacho MD  Date:    02/26/2019  Time:    05:58             Narrative:    EXAMINATION:  XR ANKLE COMPLETE 3 VIEW LEFT; XR FOOT COMPLETE 3 VIEW LEFT    CLINICAL HISTORY:  Pain in unspecified foot    TECHNIQUE:  AP, lateral and oblique views of the left ankle were performed.  AP, lateral, and oblique views of the left foot were performed.    COMPARISON:  Left ankle radiograph series 02/21/2019    FINDINGS:  There are stable postoperative changes of the distal tibia and fibula again identified.  Hardware appears intact.  Fracture fragment alignment and position appears stable from prior examination.  Postprocedural change of the calcaneus and forefoot from prior external fixation pin is noted.  No definite new skeletal abnormality identified of the left ankle or foot.  There is osteopenia present presumably relating to disuse.  There is mild diffuse soft tissue edema present.                                 Medical Decision Making:   History:   Old Medical Records: I decided to obtain old medical records.  Old  Records Summarized: records from clinic visits and records from previous admission(s).       <> Summary of Records: Patient followed here by orthopedics. Had an open left tibial pilon fracture in August 2018, treated with ex fix, revised in September. External fixator removed in December. Last follow up with orthopedic was 5 days ago. He was reporting poorly controlled pain at that time. Exam of his foot and ankle was benign and plan was to follow up in 4 weeks.   Initial Assessment:   52 yo m, recent PMH as above    Worsening L foot and ankle pain s/p fall, tripped w crutches    Mild swelling to left foot/ankle, diffuse tenderness  Differential Diagnosis:   Chronic pain  New fx  sprain  Clinical Tests:   Radiological Study: Ordered and Reviewed  ED Management:  Xray  Tylenol  Reassess    6:11 AM  Xray negative for acute fx, hardware appears in place  Will d/c home with outpatient ortho clinic f/u            Scribe Attestation:   Scribe #1: I performed the above scribed service and the documentation accurately describes the services I performed. I attest to the accuracy of the note.    I, Dr. Antoinette Keen, personally performed the services described in this documentation. All medical record entries made by the scribe were at my direction and in my presence.  I have reviewed the chart and agree that the record reflects my personal performance and is accurate and complete. Antoinette Keen MD.  5:56 AM 03/03/2019             Clinical Impression:       ICD-10-CM ICD-9-CM   1. Foot pain M79.673 729.5                                Antoinette Keen MD  03/03/19 1642

## 2019-03-01 ENCOUNTER — TELEPHONE (OUTPATIENT)
Dept: ORTHOPEDICS | Facility: CLINIC | Age: 54
End: 2019-03-01

## 2019-03-01 NOTE — TELEPHONE ENCOUNTER
----- Message from Dago Morrow sent at 3/1/2019 10:17 AM CST -----  Contact: self  Needs Advice    Reason for call: need a referral        Communication Preference: Phone     Additional Information: n/a

## 2019-03-01 NOTE — TELEPHONE ENCOUNTER
Left message for patient to return call. Regarding pt question.  And informed patient that ASHANTI Enciso PA-C trauma is not in today 3/1/2019 and will be back on Wednesday 3/2/2019.   No abnormalities noted

## 2019-03-07 ENCOUNTER — TELEPHONE (OUTPATIENT)
Dept: ORTHOPEDICS | Facility: CLINIC | Age: 54
End: 2019-03-07

## 2019-03-07 DIAGNOSIS — G89.29 OTHER CHRONIC PAIN: Primary | ICD-10-CM

## 2019-03-07 NOTE — TELEPHONE ENCOUNTER
----- Message from Yennifer Enciso PA-C sent at 3/7/2019  2:57 PM CST -----  Contact: pt  done  ----- Message -----  From: Destiney Scott MA  Sent: 3/7/2019   1:42 PM  To: Yennifer Enciso PA-C        ----- Message -----  From: Patricia De Leon  Sent: 3/7/2019  12:43 PM  To: [facility] Staff    Pt would like to be called back regarding a referral for pain mgr- faxed number  [phone]     Pt can be reached at [phone]

## 2019-03-07 NOTE — TELEPHONE ENCOUNTER
Notified pt this his referral was sent to pain mgr- faxed number  [phone]. Patient states verbal understanding and has no further questions.

## 2019-03-15 ENCOUNTER — OFFICE VISIT (OUTPATIENT)
Dept: ORTHOPEDICS | Facility: CLINIC | Age: 54
End: 2019-03-15
Payer: MEDICARE

## 2019-03-15 ENCOUNTER — HOSPITAL ENCOUNTER (OUTPATIENT)
Dept: RADIOLOGY | Facility: HOSPITAL | Age: 54
Discharge: HOME | End: 2019-03-15
Attending: PHYSICIAN ASSISTANT
Payer: MEDICARE

## 2019-03-15 DIAGNOSIS — S82.872E TYPE I OR II OPEN DISPLACED PILON FRACTURE OF LEFT TIBIA WITH ROUTINE HEALING, SUBSEQUENT ENCOUNTER: ICD-10-CM

## 2019-03-15 DIAGNOSIS — Z09 S/P ORTHOPEDIC SURGERY, FOLLOW-UP EXAM: Primary | ICD-10-CM

## 2019-03-15 DIAGNOSIS — Z09 S/P ORTHOPEDIC SURGERY, FOLLOW-UP EXAM: ICD-10-CM

## 2019-03-15 PROCEDURE — 99213 OFFICE O/P EST LOW 20 MIN: CPT | Mod: S$GLB,,, | Performed by: PHYSICIAN ASSISTANT

## 2019-03-15 PROCEDURE — 99213 PR OFFICE/OUTPT VISIT, EST, LEVL III, 20-29 MIN: ICD-10-PCS | Mod: S$GLB,,, | Performed by: PHYSICIAN ASSISTANT

## 2019-03-15 PROCEDURE — 73610 X-RAY EXAM OF ANKLE: CPT | Mod: 26,LT,, | Performed by: RADIOLOGY

## 2019-03-15 PROCEDURE — 73610 XR ANKLE COMPLETE 3 VIEW LEFT: ICD-10-PCS | Mod: 26,LT,, | Performed by: RADIOLOGY

## 2019-03-15 PROCEDURE — 99999 PR PBB SHADOW E&M-EST. PATIENT-LVL III: ICD-10-PCS | Mod: PBBFAC,,, | Performed by: PHYSICIAN ASSISTANT

## 2019-03-15 PROCEDURE — 73610 X-RAY EXAM OF ANKLE: CPT | Mod: TC,LT

## 2019-03-15 PROCEDURE — 99999 PR PBB SHADOW E&M-EST. PATIENT-LVL III: CPT | Mod: PBBFAC,,, | Performed by: PHYSICIAN ASSISTANT

## 2019-03-15 NOTE — PROGRESS NOTES
Mr. Phelps is 51 yo male with PM Hx of HTN and PE 2016 had open left tibial pilon fracture with fibula on 8/9/18 treated initially with I&D, fibular plating spanning ex fix of the tibia with limited internal fixation. He was seen on 9/16.  At that point, he'd fallen into a bit of varus with some medial displacement of the tibia.  He was taken to the OR and revised the ex fix with new tibial and calcaneal pins and included the first and fifth metatarsals. At that time he also had removed an antibiotic spacer from the tibia and placed infuse BMP2. He has external fixator removed on 12/14/2018.     Mr. Phelps is here today for a post-operative visit after a   1.  Removal of external fixation of the left leg under anesthesia.  2.  Stress exam under fluoroscopy and anesthesia, left distal tibia fracture.  by Dr. Shearer  on 12/14/2018.    Interval History:  Patient stated that he is doing okish.   He stated that his pain is now controlled. He saw pain management this week and now has a pain medication contract. . He stated that he has not done any illicit drugs since his previous time.     He stated that he is using his Forteo as prescribed.   He also reports that his is taking his Eliquis as prescribed.   He has placed some weight on the leg according to the patient. He is using crutches. He did have one episode where he fell but he went to ed and was evaluated. No new injury.   He has not started PT due to transporation. I filled out paperwork for bus service whic h was given to the patient.    He denies chest pain, SOB, fever or chills.     Physical exam:  Dressing taken down.  Incision is clean, dry and intact.  decreased range of motion in all planes.  TTP at fracture site. No signs of infection.     RADS: No new fractures/dislocations.  Hardware in place with minimal callous formation.  No signs of hardware loosening/failure    Assessment:  Post-op visit     Plan:  Current care, treatment plan, precautions, activity  level/ modifications, limitations, rehabilitation exercises and proposed future treatment were discussed with the patient. We discussed the need to monitor for changes in symptoms and condition and report them to the physician.  Discussed importance of compliance with all appointments and follow up examinations.   -  he may wash the area with antibacterial soap in the shower. Will not submerge until the incision is completely healed  -Patient was advised to monitor wound closely and multiple times daily for any problems. Call clinic immediately or report to ED for immediate medical attention for any complications including reopening of wound, drainage, purulence, redness, streaking, odor, pain out of proportion, fever, chills, etc.   -PT/OT, [place], Patient is responsible to establish and continue care   -range of motion as tolerated    - may slowly advance weightbearing  - pain medication: will receive from pain management no refills are to be given he is under contract.    - he is to follow up with PCP for DVT. He understands importance of taking medication as prescribed.   -Will continue Forteo to assist with bone healing.   - Out patient case management referral place to assist with transportation issues that patient has.   - Patient is to return to clinic in 4 weeks with   At time x-ray of his ankle is needed     If there are any questions prior to scheduled follow up, the patient was instructed to contact the office

## 2019-03-20 ENCOUNTER — TELEPHONE (OUTPATIENT)
Dept: PHARMACY | Facility: CLINIC | Age: 54
End: 2019-03-20

## 2019-03-22 ENCOUNTER — TELEPHONE (OUTPATIENT)
Dept: PHARMACY | Facility: CLINIC | Age: 54
End: 2019-03-22

## 2019-03-22 NOTE — TELEPHONE ENCOUNTER
Mr. Phelps returned OSPs call. Transferred to [facility] for f/u.    Dosing, how taking: injects 1 daily  Storage: refrigerator, patient aware pen good for 28 days after opening  Side effects: none  Pain: patient seen in ED for foot pain, Per last office visit note, patient referred to pain specialist for medication refills. Patient declined to discuss more details  Next clinical follow up: 3 cycles or sooner if clinically appropriate. Patient stable on Forteo and declines more detailed f/u.

## 2019-04-15 ENCOUNTER — TELEPHONE (OUTPATIENT)
Dept: PHARMACY | Facility: CLINIC | Age: 54
End: 2019-04-15

## 2019-04-22 ENCOUNTER — TELEPHONE (OUTPATIENT)
Dept: ORTHOPEDICS | Facility: CLINIC | Age: 54
End: 2019-04-22

## 2019-04-22 NOTE — TELEPHONE ENCOUNTER
Called and Informed patient of appointment on 4/23/19 at 9:00 am. Patient states verbal understanding and has no further questions.

## 2019-04-22 NOTE — TELEPHONE ENCOUNTER
----- Message from Yennifer Enciso PA-C sent at 4/22/2019  7:27 AM CDT -----  Contact: Self      ----- Message -----  From: Eladia Truong MA  Sent: 4/18/2019   4:32 PM  To: Yennifer Enciso PA-C    Spoke with patient stating someone will contact him on Monday to reschedule his appointment  ----- Message -----  From: Montse Carvalho  Sent: 4/18/2019   4:15 PM  To: [facility] Staff    Pt is needing to Reschedule an appt for his left ankle, pt stated it is a f/u   Pt can be reached @ [phone]

## 2019-04-30 DIAGNOSIS — S82.872E TYPE I OR II OPEN DISPLACED PILON FRACTURE OF LEFT TIBIA WITH ROUTINE HEALING, SUBSEQUENT ENCOUNTER: Primary | ICD-10-CM

## 2019-05-01 ENCOUNTER — OFFICE VISIT (OUTPATIENT)
Dept: ORTHOPEDICS | Facility: CLINIC | Age: 54
End: 2019-05-01
Payer: MEDICARE

## 2019-05-01 ENCOUNTER — HOSPITAL ENCOUNTER (OUTPATIENT)
Dept: RADIOLOGY | Facility: HOSPITAL | Age: 54
Discharge: HOME | End: 2019-05-01
Attending: ORTHOPAEDIC SURGERY
Payer: MEDICARE

## 2019-05-01 DIAGNOSIS — S82.872H: Primary | ICD-10-CM

## 2019-05-01 DIAGNOSIS — S82.872E TYPE I OR II OPEN DISPLACED PILON FRACTURE OF LEFT TIBIA WITH ROUTINE HEALING, SUBSEQUENT ENCOUNTER: ICD-10-CM

## 2019-05-01 DIAGNOSIS — S82.872E TYPE I OR II OPEN DISPLACED PILON FRACTURE OF LEFT TIBIA WITH ROUTINE HEALING, SUBSEQUENT ENCOUNTER: Primary | ICD-10-CM

## 2019-05-01 DIAGNOSIS — F17.200 SMOKER: ICD-10-CM

## 2019-05-01 PROCEDURE — 99215 OFFICE O/P EST HI 40 MIN: CPT | Mod: S$GLB,,, | Performed by: ORTHOPAEDIC SURGERY

## 2019-05-01 PROCEDURE — 73610 XR ANKLE COMPLETE 3 VIEW LEFT: ICD-10-PCS | Mod: 26,LT,, | Performed by: RADIOLOGY

## 2019-05-01 PROCEDURE — 99999 PR PBB SHADOW E&M-EST. PATIENT-LVL II: ICD-10-PCS | Mod: PBBFAC,,, | Performed by: ORTHOPAEDIC SURGERY

## 2019-05-01 PROCEDURE — 73610 X-RAY EXAM OF ANKLE: CPT | Mod: TC,LT

## 2019-05-01 PROCEDURE — 73610 X-RAY EXAM OF ANKLE: CPT | Mod: 26,LT,, | Performed by: RADIOLOGY

## 2019-05-01 PROCEDURE — 99999 PR PBB SHADOW E&M-EST. PATIENT-LVL II: CPT | Mod: PBBFAC,,, | Performed by: ORTHOPAEDIC SURGERY

## 2019-05-01 PROCEDURE — 99215 PR OFFICE/OUTPT VISIT, EST, LEVL V, 40-54 MIN: ICD-10-PCS | Mod: S$GLB,,, | Performed by: ORTHOPAEDIC SURGERY

## 2019-05-01 NOTE — PROGRESS NOTES
Mr. Phelps is 53 yo male with PM Hx of HTN and PE 2016 had open left tibial pilon fracture with fibula on 8/9/18 treated initially with I&D, fibular plating spanning ex fix of the tibia with limited internal fixation at [facility]. He was seen on 9/16.  At that point, he'd fallen into a bit of varus with some medial displacement of the tibia.  He was taken to the OR and revised the ex fix with new tibial and calcaneal pins and included the first and fifth metatarsals. At that time he also had removed an antibiotic spacer from the tibia and placed infuse BMP2. His external fixator removed on 12/14/2018.       Interval History:    Was last seen by Yennifer 1 month ago and states that things are largely unchanged. He has been going to outpatient PT and has essentially been WBAT in a tall boot for support. He does have pain with ambulation and the extremity will swell but overall he has been doing well. He is on disability.     He stated that his pain is now controlled. He sees pain management and now has a pain medication contract. . He stated that he has not done any illicit drugs since his previous time.     He stated that he is using his Forteo as prescribed.   He also reports that his is taking his Eliquis as prescribed for a DVT.    ROS:  Patient denies constitutional symptoms, cardiac symptoms, respiratory symptoms, GI symptoms.  The remainder of the musculoskeletal ROS is included in the HPI.    PE:    AA&O x 4.  NAD  HEENT:  NCAT, sclera nonicteric  Lungs:  Respirations are equal and unlabored.  CV:  2+ bilateral upper and lower extremity pulses.    MS:  Incisions well healed.  No real TTP at fracture sites.       RADS: No new fractures/dislocations.  Hardware in place with signs of bridging callous formation it the tibia. There is no signs of union of the fibula at this point.   No signs of hardware loosening/failure    A/P:  He's been slow to heal but is progressing.  He is now about 9 months post injury.   I will add  an Exogen bone stimulator to his regimen, and he will continue with the Forteo.  He also smokes and I explained the effect that has on delyed and non union of fractures.  With the smoking and DVT on Eliquis, I would not be quick to perform posterolateral bone grafting on him.  He does also continue to slowly heal in the tibia.  I am also referring him to our smoking cessation program, which he agrees to attend.  I will see him back in 2-3 months with xrays of the left tibia.

## 2019-05-03 ENCOUNTER — DOCUMENTATION ONLY (OUTPATIENT)
Dept: ORTHOPEDICS | Facility: CLINIC | Age: 54
End: 2019-05-03

## 2019-05-03 NOTE — PROGRESS NOTES
Faxed order, last office notes, op note, original and most recent x-ray reports to Art Alvarado with [employer] for bone stimulator.  [phone]

## 2019-05-09 ENCOUNTER — TELEPHONE (OUTPATIENT)
Dept: PHARMACY | Facility: CLINIC | Age: 54
End: 2019-05-09

## 2019-05-21 ENCOUNTER — TELEPHONE (OUTPATIENT)
Dept: ORTHOPEDICS | Facility: CLINIC | Age: 54
End: 2019-05-21

## 2019-05-21 NOTE — TELEPHONE ENCOUNTER
----- Message from Dago Morrow sent at 5/21/2019 12:29 PM CDT -----  Contact: self  Needs Advice    Reason for call: Pt ask for a call from Yennifer Enciso        Communication Preference: [phone]    Additional Information: n/a

## 2019-05-21 NOTE — TELEPHONE ENCOUNTER
Notified Art Alvarado with [facility]; regarding pt. Pt needed to know where to place the bone stimulator for left tibia.

## 2019-05-27 ENCOUNTER — TELEPHONE (OUTPATIENT)
Dept: ORTHOPEDICS | Facility: CLINIC | Age: 54
End: 2019-05-27

## 2019-05-27 NOTE — TELEPHONE ENCOUNTER
----- Message from Dago Morrow sent at 5/27/2019 12:46 PM CDT -----  Contact: Wife - Dana  Patient Returning Call from [facility]    Who Left Message for Patient: Yennifer Enciso    Communication Preference: Phone     Additional Information: n/a

## 2019-05-28 ENCOUNTER — TELEPHONE (OUTPATIENT)
Dept: ORTHOPEDICS | Facility: CLINIC | Age: 54
End: 2019-05-28

## 2019-05-28 NOTE — TELEPHONE ENCOUNTER
Left message for patient to return call.  Regarding a letter; need to know in detail, what is needed on the letter.

## 2019-05-28 NOTE — TELEPHONE ENCOUNTER
----- Message from Ty Bell sent at 5/28/2019 10:43 AM CDT -----  Contact: Self   A message was left fo pt on Friday, Pt called back on Monday and hasn't heard back about      the medical waiver.     [phone]

## 2019-05-30 ENCOUNTER — TELEPHONE (OUTPATIENT)
Dept: ORTHOPEDICS | Facility: CLINIC | Age: 54
End: 2019-05-30

## 2019-05-30 NOTE — TELEPHONE ENCOUNTER
----- Message from Dago Morrow sent at 5/30/2019 10:45 AM CDT -----  Contact: self  Needs Advice    Reason for call: Pt ask for a call in regards to a letter        Communication Preference: Phone     Additional Information: n/a

## 2019-05-30 NOTE — TELEPHONE ENCOUNTER
Left message for patient wife Dana at    to return call.  Regarding a letter. Need to know; what the letter need to say?

## 2019-06-06 ENCOUNTER — TELEPHONE (OUTPATIENT)
Dept: PHARMACY | Facility: CLINIC | Age: 54
End: 2019-06-06

## 2019-07-09 ENCOUNTER — TELEPHONE (OUTPATIENT)
Dept: PHARMACY | Facility: CLINIC | Age: 54
End: 2019-07-09

## 2019-07-09 NOTE — TELEPHONE ENCOUNTER
Called patient for refill and follow up for Forteo. He was not willing to discuss his medication at this time, but states that he has not had any issues with giving himself the Forteo and no noted side effects. Confirms that he stores the Forteo pen in the refrigerator. Was not able to give me how much he has left on hand. He would like the medication shipped for him to receive on 7/12. Address confirmed. $0. No changes to medications, allergies or health conditions.

## 2019-08-02 ENCOUNTER — TELEPHONE (OUTPATIENT)
Dept: PHARMACY | Facility: CLINIC | Age: 54
End: 2019-08-02

## 2019-08-07 ENCOUNTER — OFFICE VISIT (OUTPATIENT)
Dept: ORTHOPEDICS | Facility: CLINIC | Age: 54
End: 2019-08-07
Payer: MEDICARE

## 2019-08-07 ENCOUNTER — HOSPITAL ENCOUNTER (OUTPATIENT)
Dept: RADIOLOGY | Facility: HOSPITAL | Age: 54
Discharge: HOME | End: 2019-08-07
Attending: ORTHOPAEDIC SURGERY
Payer: MEDICARE

## 2019-08-07 DIAGNOSIS — S82.872H: Primary | ICD-10-CM

## 2019-08-07 DIAGNOSIS — S82.872E TYPE I OR II OPEN DISPLACED PILON FRACTURE OF LEFT TIBIA WITH ROUTINE HEALING, SUBSEQUENT ENCOUNTER: ICD-10-CM

## 2019-08-07 DIAGNOSIS — S82.872E TYPE I OR II OPEN DISPLACED PILON FRACTURE OF LEFT TIBIA WITH ROUTINE HEALING, SUBSEQUENT ENCOUNTER: Primary | ICD-10-CM

## 2019-08-07 PROCEDURE — 73610 X-RAY EXAM OF ANKLE: CPT | Mod: 26,LT,, | Performed by: RADIOLOGY

## 2019-08-07 PROCEDURE — 73610 XR ANKLE COMPLETE 3 VIEW LEFT: ICD-10-PCS | Mod: 26,LT,, | Performed by: RADIOLOGY

## 2019-08-07 PROCEDURE — 99214 OFFICE O/P EST MOD 30 MIN: CPT | Mod: S$GLB,,, | Performed by: ORTHOPAEDIC SURGERY

## 2019-08-07 PROCEDURE — 73610 X-RAY EXAM OF ANKLE: CPT | Mod: TC,LT

## 2019-08-07 PROCEDURE — 99214 PR OFFICE/OUTPT VISIT, EST, LEVL IV, 30-39 MIN: ICD-10-PCS | Mod: S$GLB,,, | Performed by: ORTHOPAEDIC SURGERY

## 2019-08-07 PROCEDURE — 99999 PR PBB SHADOW E&M-EST. PATIENT-LVL I: ICD-10-PCS | Mod: PBBFAC,,, | Performed by: ORTHOPAEDIC SURGERY

## 2019-08-07 PROCEDURE — 99999 PR PBB SHADOW E&M-EST. PATIENT-LVL I: CPT | Mod: PBBFAC,,, | Performed by: ORTHOPAEDIC SURGERY

## 2019-08-09 ENCOUNTER — TELEPHONE (OUTPATIENT)
Dept: PHARMACY | Facility: CLINIC | Age: 54
End: 2019-08-09

## 2019-08-11 NOTE — PROGRESS NOTES
Mr. Phelps is 51 yo male with PM Hx of HTN and PE 2016 had open left tibial pilon fracture with fibula on 8/9/18 treated initially with I&D, fibular plating spanning ex fix of the tibia with limited internal fixation at [facility]. He was seen on 9/16.  At that point, he'd fallen into a bit of varus with some medial displacement of the tibia.  He was taken to the OR and I revised the ex fix with new tibial and calcaneal pins and included the first and fifth metatarsals. At that time I also had removed an antibiotic spacer from the tibia and placed infuse BMP2. His external fixator removed on 12/14/2018.     His medial wound/incision has healed up nicely and he's had no signs of infection.  He's now been on Forteo and weight bearing with one crutch.  We discussed the possible need for posterolateral bone graft, but he's maintained the bony position and has slowly been filling in.  His pain remains minimal and he continues to ambulate.     ROS:  Patient denies constitutional symptoms, cardiac symptoms, respiratory symptoms, GI symptoms.  The remainder of the musculoskeletal ROS is included in the HPI.    PE:    AA&O x 4.  NAD  HEENT:  NCAT, sclera nonicteric  Lungs:  Respirations are equal and unlabored.  CV:  2+ bilateral upper and lower extremity pulses.    MS:  Incisions well healed.  No real TTP at fracture sites.       RADS: No new fractures/dislocations.  Hardware in place with signs of bridging callous formation it the tibia. There is no sign of union of the fibula at this point.   No signs of hardware loosening/failure.  Some continued bone growth in tibial fracture site.    A/P:  He will continee WBAT and continue the Forteo.  I will see him back in 2-3 months with x-rays of the left ankle.

## 2019-08-27 ENCOUNTER — TELEPHONE (OUTPATIENT)
Dept: PHARMACY | Facility: CLINIC | Age: 54
End: 2019-08-27

## 2019-09-25 RX ORDER — TERIPARATIDE 250 UG/ML
20 INJECTION, SOLUTION SUBCUTANEOUS DAILY
Qty: 28.8 ML | Refills: 0 | Status: CANCELLED | OUTPATIENT
Start: 2019-09-25 | End: 2020-09-24

## 2019-10-01 DIAGNOSIS — S82.872H: Primary | ICD-10-CM

## 2019-10-01 RX ORDER — TERIPARATIDE 250 UG/ML
20 INJECTION, SOLUTION SUBCUTANEOUS DAILY
Qty: 28.8 ML | Refills: 0 | Status: SHIPPED | OUTPATIENT
Start: 2019-10-01 | End: 2020-09-30

## 2019-10-01 RX ORDER — TERIPARATIDE 250 UG/ML
20 INJECTION, SOLUTION SUBCUTANEOUS DAILY
Qty: 28.8 ML | Refills: 0 | Status: SHIPPED | OUTPATIENT
Start: 2019-10-01 | End: 2019-10-01 | Stop reason: SDUPTHER

## 2019-10-01 NOTE — TELEPHONE ENCOUNTER
----- Message from Destiney Scott MA sent at 10/1/2019  1:37 PM CDT -----  Contact: Suzette ([facility]) @ [phone]      ----- Message -----  From: Katerina Manrique  Sent: 10/1/2019   1:23 PM CDT  To: [facility]ie De Oliveira Staff    Calling for an urgent refill for medication: teriparatide (FORTEO) 20 mcg/dose - 600 mcg/2.4 mL PnIj. Please call.

## 2019-10-01 NOTE — TELEPHONE ENCOUNTER
Spoke with Leah with [facility].  Advised that refill request has been forwarded to Dr Shearer for approval.

## 2019-10-02 ENCOUNTER — TELEPHONE (OUTPATIENT)
Dept: PHARMACY | Facility: CLINIC | Age: 54
End: 2019-10-02

## 2019-10-15 NOTE — TELEPHONE ENCOUNTER
Patient reached on 10/10/19 for Forteo refill.Patient confirmed need of the refill. Will [employer] on 10/10 to arrive on 10/11 with patient consent. Copay $0 at 004. Address confirmed. Patient has 0 doses remaining / next injection due (daily). Patient denies side effects. Counseled on missed doses.  No new medications/allergies/medical conditions. Most recent labs 2019. No ER/Urgent care visits in past month. Pen needles and alcohol swabs needed, but no Sharps container needed. Patient taking the medication as directed. Patient denies any further questions. Confirmed 2 patient identifiers - Name and .

## 2019-11-01 ENCOUNTER — TELEPHONE (OUTPATIENT)
Dept: PHARMACY | Facility: CLINIC | Age: 54
End: 2019-11-01

## 2019-11-05 ENCOUNTER — TELEPHONE (OUTPATIENT)
Dept: PHARMACY | Facility: CLINIC | Age: 54
End: 2019-11-05

## 2019-11-11 NOTE — TELEPHONE ENCOUNTER
Patient's wife returned call & confirmed shipment of Forteo refill. Verified 2 patient identifiers. Patient's wife approved $0 copay. No new medications, allergies, or health conditions. Confirmed that dosage has not changed. No missed doses. 0 doses remaining. Next dose due on Tues 11/12. Shipping address confirmed. Signature requirement declined. Patient's wife requested sharps container . Medication will ship Mon 11/11 for Tues 11/12 delivery. Patient voiced understanding & has no questions or concerns at this time. DARLYN

## 2019-12-06 ENCOUNTER — TELEPHONE (OUTPATIENT)
Dept: PHARMACY | Facility: CLINIC | Age: 54
End: 2019-12-06

## 2019-12-09 ENCOUNTER — TELEPHONE (OUTPATIENT)
Dept: ORTHOPEDICS | Facility: CLINIC | Age: 54
End: 2019-12-09

## 2019-12-09 NOTE — TELEPHONE ENCOUNTER
----- Message from Elena Diaz MA sent at 12/9/2019 10:04 AM CST -----  Contact: Self/[phone]  Pt is requesting a call to schedule his follow up appt.

## 2019-12-09 NOTE — TELEPHONE ENCOUNTER
Called and Informed patient of appointment with Dr. SMYTH and including xray on 12/11/19. Patient states verbal understanding and has no further questions.

## 2019-12-11 ENCOUNTER — HOSPITAL ENCOUNTER (OUTPATIENT)
Dept: RADIOLOGY | Facility: HOSPITAL | Age: 54
Discharge: HOME | End: 2019-12-11
Attending: ORTHOPAEDIC SURGERY
Payer: MEDICARE

## 2019-12-11 ENCOUNTER — OFFICE VISIT (OUTPATIENT)
Dept: ORTHOPEDICS | Facility: CLINIC | Age: 54
End: 2019-12-11
Payer: MEDICARE

## 2019-12-11 VITALS — BODY MASS INDEX: 31.65 KG/M2 | HEIGHT: 65 IN | WEIGHT: 190 LBS

## 2019-12-11 DIAGNOSIS — S82.872E TYPE I OR II OPEN DISPLACED PILON FRACTURE OF LEFT TIBIA WITH ROUTINE HEALING, SUBSEQUENT ENCOUNTER: ICD-10-CM

## 2019-12-11 DIAGNOSIS — M72.2 PLANTAR FASCIITIS OF LEFT FOOT: ICD-10-CM

## 2019-12-11 DIAGNOSIS — S82.872H: Primary | ICD-10-CM

## 2019-12-11 PROCEDURE — 99214 OFFICE O/P EST MOD 30 MIN: CPT | Mod: S$GLB,,, | Performed by: ORTHOPAEDIC SURGERY

## 2019-12-11 PROCEDURE — 3008F BODY MASS INDEX DOCD: CPT | Mod: CPTII,S$GLB,, | Performed by: ORTHOPAEDIC SURGERY

## 2019-12-11 PROCEDURE — 73610 X-RAY EXAM OF ANKLE: CPT | Mod: 26,LT,, | Performed by: RADIOLOGY

## 2019-12-11 PROCEDURE — 99999 PR PBB SHADOW E&M-EST. PATIENT-LVL II: CPT | Mod: PBBFAC,,, | Performed by: ORTHOPAEDIC SURGERY

## 2019-12-11 PROCEDURE — 99999 PR PBB SHADOW E&M-EST. PATIENT-LVL II: ICD-10-PCS | Mod: PBBFAC,,, | Performed by: ORTHOPAEDIC SURGERY

## 2019-12-11 PROCEDURE — 73610 XR ANKLE COMPLETE 3 VIEW LEFT: ICD-10-PCS | Mod: 26,LT,, | Performed by: RADIOLOGY

## 2019-12-11 PROCEDURE — 3008F PR BODY MASS INDEX (BMI) DOCUMENTED: ICD-10-PCS | Mod: CPTII,S$GLB,, | Performed by: ORTHOPAEDIC SURGERY

## 2019-12-11 PROCEDURE — 99214 PR OFFICE/OUTPT VISIT, EST, LEVL IV, 30-39 MIN: ICD-10-PCS | Mod: S$GLB,,, | Performed by: ORTHOPAEDIC SURGERY

## 2019-12-11 PROCEDURE — 73610 X-RAY EXAM OF ANKLE: CPT | Mod: TC,LT

## 2019-12-11 RX ORDER — DICLOFENAC SODIUM 10 MG/G
2 GEL TOPICAL 2 TIMES DAILY PRN
Qty: 1 TUBE | Refills: 2 | Status: SHIPPED | OUTPATIENT
Start: 2019-12-11

## 2019-12-11 NOTE — PROGRESS NOTES
Mr. Phelps is 51 yo male with PM Hx of HTN and PE 2016 had open left tibial pilon fracture with fibula on 8/9/18 treated initially with I&D, fibular plating spanning ex fix of the tibia with limited internal fixation at [facility]. He was seen on 9/16.  At that point, he'd fallen into a bit of varus with some medial displacement of the tibia.  He was taken to the OR and I revised the ex fix with new tibial and calcaneal pins and included the first and fifth metatarsals. At that time I also had removed an antibiotic spacer from the tibia and placed infuse BMP2. His external fixator was removed on 12/14/2018.     His medial wound/incision has healed up nicely and he's had no signs of infection.  He has been Forteo for the better part of the last year and has been ambulating.  At prior visit we discussed posterolateral bone grafting with the patient was doing well clinically with no change in position and no pain so elected to continue activity as tolerated.    Since his last visit he has been in pain management clinic where he was taking some chronic narcotics for pain around the ankle. He states he was incarcerated for about 2 months and is no longer being seen by that clinic.  He has been ambulating in a Cam boot without assistive devices.  He states that he feels a stiffness and sort of fullness mostly in the heel and bottom of his foot as well as some anterior pain around the ankle.    ROS:  Patient denies constitutional symptoms, cardiac symptoms, respiratory symptoms, GI symptoms.  The remainder of the musculoskeletal ROS is included in the HPI.    PE:    AA&O x 4.  NAD  HEENT:  NCAT, sclera nonicteric  Lungs:  Respirations are equal and unlabored.  CV:  2+ bilateral upper and lower extremity pulses.    MS:  Incisions well healed.  No real TTP at fracture sites.   Very stiff with limited motion at the ankle.  Good forefoot motion.  Some tenderness to palpation around the plantar fascia and medial calcaneus.  All  incisions are well healed.    RADS: No new fractures/dislocations.  Hardware in place with signs of bridging callous formation it the tibia. There is no sign of union of the fibula at this point.   No signs of hardware loosening/failure.  Some continued bone growth in tibial fracture site.    A/P:  Sixteen months status post spanning external fixation of grade 3 open left tibial pilon with limited internal fixation at the joint surface and ORIF of fibula.  At this point his fracture site is not moved at all and looks like he has some spot welded healing on that area.  Most of his pain seems to be from soft tissue around that area and not sure the posterolateral bone grafting is going to had a lot to the scenario.  He is very stiff at the ankle and does stand risk for ankle arthritis.  I would hate to do another surgery prior to that should he need an ankle fusion unless it is necessary by way of fracture motion. I showed him how to do scar massage and encouraged him to work his way into a regular shoe.  I have also encouraged him to work on using a ball on the plantar surface of his foot to warm this area up.  We had a long discussion about narcotics and the fact that they do overall increase the pain sensation in the long run.  He would like to try some Voltaren gel for this area.

## 2020-01-01 NOTE — TELEPHONE ENCOUNTER
Submitted prior authorization request for Forteo to insurance on 09/20 12:59pm. SRINIVASAN   [Mother] : mother

## 2020-01-06 ENCOUNTER — TELEPHONE (OUTPATIENT)
Dept: PHARMACY | Facility: CLINIC | Age: 55
End: 2020-01-06

## 2020-01-13 ENCOUNTER — TELEPHONE (OUTPATIENT)
Dept: PHARMACY | Facility: CLINIC | Age: 55
End: 2020-01-13

## 2020-01-13 RX ORDER — APIXABAN 5 MG/1
TABLET, FILM COATED ORAL
Refills: 3 | COMMUNITY
Start: 2019-10-29

## 2020-01-13 NOTE — TELEPHONE ENCOUNTER
Mr. Phelps reached for Forteo followup. He has been on therapy for 15 months and should complete therapy around October, 2020. He confirms he is injecting as directed and denies any missed doses. He declines review of potential ADRs and injection technique. He reports:   Tried and Failed meds: unchanged  Smoker: 5-6 cigarettes; declined offer of materials on quitting smoking   Alcohol: occasional   Recent Falls: no   Fracture Hx: tibial fracture (indication for Forteo)  Recent trips to Urgent Care or the Emergency Room: no   Activity Level: gets out of the house daily, no PT. Walks with a cane. No trouble with stairs.   Pain intensity: 5  Overall Health QoL: 10 (10-best)    No new labs on file. Most recent:  Lab Results   Component Value Date    CALCIUM 8.1 (L) 01/11/2019    ALBUMIN 2.1 (L) 01/11/2019    [id number] 15 (L) 10/11/2018     Recognizes he needs to take a calcium and vitamin D supplement but seemingly unwilling to buy OTC. inBasket sent to request new scripts be sent to preferred pharmacy  most current DEXA scan T-score is 9/27/18 - does not show osteoporosis.    Allergies reviewed and med rec completed. Eliquis added. No DDIs with Forteo encountered. Patient understands to report any medication changes to [facility] and provider.

## 2020-01-20 ENCOUNTER — TELEPHONE (OUTPATIENT)
Dept: ORTHOPEDICS | Facility: CLINIC | Age: 55
End: 2020-01-20

## 2020-01-20 DIAGNOSIS — M80.80XA PATHOLOGICAL FRACTURE DUE TO OSTEOPOROSIS, UNSPECIFIED FRACTURE SITE, UNSPECIFIED OSTEOPOROSIS TYPE, INITIAL ENCOUNTER: Primary | ICD-10-CM

## 2020-01-20 DIAGNOSIS — M81.6 LOCALIZED OSTEOPOROSIS OF LEQUESNE: ICD-10-CM

## 2020-01-20 DIAGNOSIS — M81.0 OSTEOPOROSIS, UNSPECIFIED OSTEOPOROSIS TYPE, UNSPECIFIED PATHOLOGICAL FRACTURE PRESENCE: ICD-10-CM

## 2020-01-22 ENCOUNTER — TELEPHONE (OUTPATIENT)
Dept: ORTHOPEDICS | Facility: CLINIC | Age: 55
End: 2020-01-22

## 2020-01-22 RX ORDER — VIT C/E/ZN/COPPR/LUTEIN/ZEAXAN 250MG-90MG
1000 CAPSULE ORAL DAILY
Qty: 30 CAPSULE | Refills: 3 | Status: SHIPPED | OUTPATIENT
Start: 2020-01-22

## 2020-01-22 NOTE — TELEPHONE ENCOUNTER
Spoke with patient stating his perscription  For vitamin D went to [facility] pharmacy

## 2020-01-24 ENCOUNTER — TELEPHONE (OUTPATIENT)
Dept: ORTHOPEDICS | Facility: CLINIC | Age: 55
End: 2020-01-24

## 2020-01-31 ENCOUNTER — TELEPHONE (OUTPATIENT)
Dept: PHARMACY | Facility: CLINIC | Age: 55
End: 2020-01-31

## 2020-02-28 ENCOUNTER — TELEPHONE (OUTPATIENT)
Dept: PHARMACY | Facility: CLINIC | Age: 55
End: 2020-02-28

## 2020-04-07 ENCOUNTER — DOCUMENTATION ONLY (OUTPATIENT)
Dept: ORTHOPEDICS | Facility: CLINIC | Age: 55
End: 2020-04-07

## 2020-04-07 NOTE — PROGRESS NOTES
Our  staff was notified last week of pt's passing on 2/5/2020.     This info was confirmed through [email or web address]

## (undated) DEVICE — TAPE SURG DURAPORE 2 X10YD

## (undated) DEVICE — BIT DRILL GRAY PT APEX 4X200MM

## (undated) DEVICE — PADDING CAST SPECIALIST 6X4YD

## (undated) DEVICE — PADDING CAST 4IN SPECIALIST

## (undated) DEVICE — DRESSING AQUACEL AG ADV 3.5X6

## (undated) DEVICE — SEE MEDLINE ITEM 152529

## (undated) DEVICE — DRAPE C ARM 42 X 120 10/BX

## (undated) DEVICE — BANDAGE KERLIX AMD

## (undated) DEVICE — GAUZE SPONGE 4X4 12PLY

## (undated) DEVICE — SEE MEDLINE ITEM 146271

## (undated) DEVICE — SPONGE DERMACEA GAUZE 4X4

## (undated) DEVICE — DRESSING AQUACEL FOAM 3 X 3

## (undated) DEVICE — APPLICATOR CHLORAPREP ORN 26ML

## (undated) DEVICE — SEE MEDLINE ITEM 157150

## (undated) DEVICE — DRESSING AQUACEL AG FOAM 4X4

## (undated) DEVICE — DRAPE PLASTIC U 60X72

## (undated) DEVICE — TRAY MINOR ORTHO

## (undated) DEVICE — SPLINT PLASTER FS 5INX45IN

## (undated) DEVICE — SEE MEDLINE ITEM 146298

## (undated) DEVICE — SEE MEDLINE ITEM 146270

## (undated) DEVICE — SEE MEDLINE ITEM 146347

## (undated) DEVICE — SEE MEDLINE ITEM 152622

## (undated) DEVICE — ELECTRODE REM PLYHSV RETURN 9

## (undated) DEVICE — DRAPE STERI U-SHAPED 47X51IN

## (undated) DEVICE — DRESSING MEPILEX BORDER 4 X 4

## (undated) DEVICE — SEE MEDLINE ITEM 152515

## (undated) DEVICE — DRAPE C-ARMOR EQUIPMENT COVER

## (undated) DEVICE — SCRUB HIBICLENS 4% CHG 4OZ

## (undated) DEVICE — DRESSING AQUACEL AG ADV 3.5X12

## (undated) DEVICE — DRESSING GAUZE 6PLY 4X4